# Patient Record
Sex: MALE | Race: WHITE | Employment: OTHER | URBAN - METROPOLITAN AREA
[De-identification: names, ages, dates, MRNs, and addresses within clinical notes are randomized per-mention and may not be internally consistent; named-entity substitution may affect disease eponyms.]

---

## 2017-01-04 DIAGNOSIS — D84.9 IMMUNOSUPPRESSED STATUS (H): ICD-10-CM

## 2017-01-04 DIAGNOSIS — Z94.0 KIDNEY REPLACED BY TRANSPLANT: ICD-10-CM

## 2017-01-04 LAB
ANION GAP SERPL CALCULATED.3IONS-SCNC: 9 MMOL/L (ref 3–14)
BUN SERPL-MCNC: 23 MG/DL (ref 7–30)
CALCIUM SERPL-MCNC: 9.9 MG/DL (ref 8.5–10.1)
CHLORIDE SERPL-SCNC: 106 MMOL/L (ref 94–109)
CO2 SERPL-SCNC: 26 MMOL/L (ref 20–32)
CREAT SERPL-MCNC: 1.31 MG/DL (ref 0.66–1.25)
ERYTHROCYTE [DISTWIDTH] IN BLOOD BY AUTOMATED COUNT: 13 % (ref 10–15)
GFR SERPL CREATININE-BSD FRML MDRD: 54 ML/MIN/1.7M2
GLUCOSE SERPL-MCNC: 88 MG/DL (ref 70–99)
HCT VFR BLD AUTO: 43.2 % (ref 40–53)
HGB BLD-MCNC: 13.7 G/DL (ref 13.3–17.7)
MCH RBC QN AUTO: 29 PG (ref 26.5–33)
MCHC RBC AUTO-ENTMCNC: 31.7 G/DL (ref 31.5–36.5)
MCV RBC AUTO: 91 FL (ref 78–100)
PLATELET # BLD AUTO: 132 10E9/L (ref 150–450)
POTASSIUM SERPL-SCNC: 4.9 MMOL/L (ref 3.4–5.3)
RBC # BLD AUTO: 4.73 10E12/L (ref 4.4–5.9)
SODIUM SERPL-SCNC: 141 MMOL/L (ref 133–144)
TACROLIMUS BLD-MCNC: 5.5 UG/L (ref 5–15)
TME LAST DOSE: 2000 H
WBC # BLD AUTO: 2.6 10E9/L (ref 4–11)

## 2017-01-04 PROCEDURE — 36415 COLL VENOUS BLD VENIPUNCTURE: CPT | Performed by: INTERNAL MEDICINE

## 2017-01-04 PROCEDURE — 80048 BASIC METABOLIC PNL TOTAL CA: CPT | Performed by: INTERNAL MEDICINE

## 2017-01-04 PROCEDURE — 80197 ASSAY OF TACROLIMUS: CPT | Mod: 90 | Performed by: FAMILY MEDICINE

## 2017-01-04 PROCEDURE — 99000 SPECIMEN HANDLING OFFICE-LAB: CPT | Performed by: FAMILY MEDICINE

## 2017-01-04 PROCEDURE — 85027 COMPLETE CBC AUTOMATED: CPT | Performed by: FAMILY MEDICINE

## 2017-01-31 DIAGNOSIS — D84.9 IMMUNOSUPPRESSED STATUS (H): ICD-10-CM

## 2017-01-31 DIAGNOSIS — Z94.0 KIDNEY REPLACED BY TRANSPLANT: ICD-10-CM

## 2017-01-31 LAB
ANION GAP SERPL CALCULATED.3IONS-SCNC: 5 MMOL/L (ref 3–14)
BUN SERPL-MCNC: 21 MG/DL (ref 7–30)
CALCIUM SERPL-MCNC: 9.4 MG/DL (ref 8.5–10.1)
CHLORIDE SERPL-SCNC: 107 MMOL/L (ref 94–109)
CO2 SERPL-SCNC: 27 MMOL/L (ref 20–32)
CREAT SERPL-MCNC: 1.27 MG/DL (ref 0.66–1.25)
ERYTHROCYTE [DISTWIDTH] IN BLOOD BY AUTOMATED COUNT: 13.1 % (ref 10–15)
GFR SERPL CREATININE-BSD FRML MDRD: 56 ML/MIN/1.7M2
GLUCOSE SERPL-MCNC: 101 MG/DL (ref 70–99)
HCT VFR BLD AUTO: 42.6 % (ref 40–53)
HGB BLD-MCNC: 13.6 G/DL (ref 13.3–17.7)
MCH RBC QN AUTO: 28.8 PG (ref 26.5–33)
MCHC RBC AUTO-ENTMCNC: 31.9 G/DL (ref 31.5–36.5)
MCV RBC AUTO: 90 FL (ref 78–100)
PLATELET # BLD AUTO: 125 10E9/L (ref 150–450)
POTASSIUM SERPL-SCNC: 4.5 MMOL/L (ref 3.4–5.3)
RBC # BLD AUTO: 4.72 10E12/L (ref 4.4–5.9)
SODIUM SERPL-SCNC: 139 MMOL/L (ref 133–144)
TACROLIMUS BLD-MCNC: 5.7 UG/L (ref 5–15)
TME LAST DOSE: 2000 H
WBC # BLD AUTO: 2.4 10E9/L (ref 4–11)

## 2017-01-31 PROCEDURE — 36415 COLL VENOUS BLD VENIPUNCTURE: CPT | Performed by: INTERNAL MEDICINE

## 2017-01-31 PROCEDURE — 80048 BASIC METABOLIC PNL TOTAL CA: CPT | Performed by: INTERNAL MEDICINE

## 2017-01-31 PROCEDURE — 85027 COMPLETE CBC AUTOMATED: CPT | Performed by: FAMILY MEDICINE

## 2017-01-31 PROCEDURE — 80197 ASSAY OF TACROLIMUS: CPT | Performed by: INTERNAL MEDICINE

## 2017-03-01 DIAGNOSIS — D84.9 IMMUNOSUPPRESSED STATUS (H): ICD-10-CM

## 2017-03-01 DIAGNOSIS — Z94.0 KIDNEY REPLACED BY TRANSPLANT: ICD-10-CM

## 2017-03-01 LAB
ANION GAP SERPL CALCULATED.3IONS-SCNC: 5 MMOL/L (ref 3–14)
BUN SERPL-MCNC: 21 MG/DL (ref 7–30)
CALCIUM SERPL-MCNC: 9.7 MG/DL (ref 8.5–10.1)
CHLORIDE SERPL-SCNC: 105 MMOL/L (ref 94–109)
CO2 SERPL-SCNC: 28 MMOL/L (ref 20–32)
CREAT SERPL-MCNC: 1.18 MG/DL (ref 0.66–1.25)
ERYTHROCYTE [DISTWIDTH] IN BLOOD BY AUTOMATED COUNT: 13.1 % (ref 10–15)
GFR SERPL CREATININE-BSD FRML MDRD: 61 ML/MIN/1.7M2
GLUCOSE SERPL-MCNC: 106 MG/DL (ref 70–99)
HCT VFR BLD AUTO: 43.2 % (ref 40–53)
HGB BLD-MCNC: 13.7 G/DL (ref 13.3–17.7)
MCH RBC QN AUTO: 28.5 PG (ref 26.5–33)
MCHC RBC AUTO-ENTMCNC: 31.7 G/DL (ref 31.5–36.5)
MCV RBC AUTO: 90 FL (ref 78–100)
PLATELET # BLD AUTO: 135 10E9/L (ref 150–450)
POTASSIUM SERPL-SCNC: 4.8 MMOL/L (ref 3.4–5.3)
RBC # BLD AUTO: 4.81 10E12/L (ref 4.4–5.9)
SODIUM SERPL-SCNC: 138 MMOL/L (ref 133–144)
TACROLIMUS BLD-MCNC: 4.8 UG/L (ref 5–15)
TME LAST DOSE: ABNORMAL H
WBC # BLD AUTO: 3.4 10E9/L (ref 4–11)

## 2017-03-01 PROCEDURE — 80197 ASSAY OF TACROLIMUS: CPT | Performed by: INTERNAL MEDICINE

## 2017-03-01 PROCEDURE — 36415 COLL VENOUS BLD VENIPUNCTURE: CPT | Performed by: INTERNAL MEDICINE

## 2017-03-01 PROCEDURE — 85027 COMPLETE CBC AUTOMATED: CPT | Performed by: FAMILY MEDICINE

## 2017-03-01 PROCEDURE — 80048 BASIC METABOLIC PNL TOTAL CA: CPT | Performed by: INTERNAL MEDICINE

## 2017-04-05 ENCOUNTER — RESULTS ONLY (OUTPATIENT)
Dept: OTHER | Facility: CLINIC | Age: 72
End: 2017-04-05

## 2017-04-05 DIAGNOSIS — D84.9 IMMUNOSUPPRESSED STATUS (H): ICD-10-CM

## 2017-04-05 DIAGNOSIS — Z94.0 KIDNEY REPLACED BY TRANSPLANT: ICD-10-CM

## 2017-04-05 LAB
ANION GAP SERPL CALCULATED.3IONS-SCNC: 8 MMOL/L (ref 3–14)
BUN SERPL-MCNC: 23 MG/DL (ref 7–30)
CALCIUM SERPL-MCNC: 9.7 MG/DL (ref 8.5–10.1)
CHLORIDE SERPL-SCNC: 106 MMOL/L (ref 94–109)
CO2 SERPL-SCNC: 25 MMOL/L (ref 20–32)
CREAT SERPL-MCNC: 1.15 MG/DL (ref 0.66–1.25)
CREAT UR-MCNC: 37 MG/DL
ERYTHROCYTE [DISTWIDTH] IN BLOOD BY AUTOMATED COUNT: 13.3 % (ref 10–15)
GFR SERPL CREATININE-BSD FRML MDRD: 63 ML/MIN/1.7M2
GLUCOSE SERPL-MCNC: 91 MG/DL (ref 70–99)
HCT VFR BLD AUTO: 43.2 % (ref 40–53)
HGB BLD-MCNC: 13.8 G/DL (ref 13.3–17.7)
MCH RBC QN AUTO: 28.8 PG (ref 26.5–33)
MCHC RBC AUTO-ENTMCNC: 31.9 G/DL (ref 31.5–36.5)
MCV RBC AUTO: 90 FL (ref 78–100)
PLATELET # BLD AUTO: 122 10E9/L (ref 150–450)
POTASSIUM SERPL-SCNC: 4.4 MMOL/L (ref 3.4–5.3)
PROT UR-MCNC: <0.05 G/L
PROT/CREAT 24H UR: NORMAL G/G CR (ref 0–0.2)
RBC # BLD AUTO: 4.8 10E12/L (ref 4.4–5.9)
SODIUM SERPL-SCNC: 139 MMOL/L (ref 133–144)
TACROLIMUS BLD-MCNC: 4.3 UG/L (ref 5–15)
TME LAST DOSE: ABNORMAL H
WBC # BLD AUTO: 2.4 10E9/L (ref 4–11)

## 2017-04-05 PROCEDURE — 84156 ASSAY OF PROTEIN URINE: CPT | Performed by: INTERNAL MEDICINE

## 2017-04-05 PROCEDURE — 36415 COLL VENOUS BLD VENIPUNCTURE: CPT | Performed by: INTERNAL MEDICINE

## 2017-04-05 PROCEDURE — 87799 DETECT AGENT NOS DNA QUANT: CPT | Performed by: INTERNAL MEDICINE

## 2017-04-05 PROCEDURE — 85027 COMPLETE CBC AUTOMATED: CPT | Performed by: FAMILY MEDICINE

## 2017-04-05 PROCEDURE — 86832 HLA CLASS I HIGH DEFIN QUAL: CPT | Performed by: INTERNAL MEDICINE

## 2017-04-05 PROCEDURE — 86833 HLA CLASS II HIGH DEFIN QUAL: CPT | Performed by: INTERNAL MEDICINE

## 2017-04-05 PROCEDURE — 80048 BASIC METABOLIC PNL TOTAL CA: CPT | Performed by: INTERNAL MEDICINE

## 2017-04-05 PROCEDURE — 80197 ASSAY OF TACROLIMUS: CPT | Performed by: INTERNAL MEDICINE

## 2017-04-06 LAB — PRA DONOR SPECIFIC ABY: NORMAL

## 2017-04-07 LAB
BKV DNA # SPEC NAA+PROBE: NORMAL COPIES/ML
BKV DNA SPEC NAA+PROBE-LOG#: NORMAL LOG COPIES/ML
SPECIMEN SOURCE: NORMAL

## 2017-04-11 LAB
DONOR IDENTIFICATION: NORMAL
DSA COMMENTS: NORMAL
DSA PRESENT: NO
DSA TEST METHOD: NORMAL
ORGAN: NORMAL
SA1 CELL: NORMAL
SA1 COMMENTS: NORMAL
SA1 HI RISK ABY: NORMAL
SA1 MOD RISK ABY: NORMAL
SA1 TEST METHOD: NORMAL
SA2 CELL: NORMAL
SA2 COMMENTS: NORMAL
SA2 HI RISK ABY UA: NORMAL
SA2 MOD RISK ABY: NORMAL
SA2 TEST METHOD: NORMAL

## 2017-05-03 ENCOUNTER — TELEPHONE (OUTPATIENT)
Dept: PHARMACY | Facility: CLINIC | Age: 72
End: 2017-05-03

## 2017-05-03 DIAGNOSIS — Z94.0 KIDNEY REPLACED BY TRANSPLANT: ICD-10-CM

## 2017-05-03 DIAGNOSIS — D84.9 IMMUNOSUPPRESSED STATUS (H): ICD-10-CM

## 2017-05-03 LAB
ANION GAP SERPL CALCULATED.3IONS-SCNC: 6 MMOL/L (ref 3–14)
BUN SERPL-MCNC: 22 MG/DL (ref 7–30)
CALCIUM SERPL-MCNC: 9.7 MG/DL (ref 8.5–10.1)
CHLORIDE SERPL-SCNC: 106 MMOL/L (ref 94–109)
CO2 SERPL-SCNC: 27 MMOL/L (ref 20–32)
CREAT SERPL-MCNC: 1.19 MG/DL (ref 0.66–1.25)
ERYTHROCYTE [DISTWIDTH] IN BLOOD BY AUTOMATED COUNT: 13.3 % (ref 10–15)
GFR SERPL CREATININE-BSD FRML MDRD: 60 ML/MIN/1.7M2
GLUCOSE SERPL-MCNC: 98 MG/DL (ref 70–99)
HCT VFR BLD AUTO: 42.5 % (ref 40–53)
HGB BLD-MCNC: 13.7 G/DL (ref 13.3–17.7)
MCH RBC QN AUTO: 29.1 PG (ref 26.5–33)
MCHC RBC AUTO-ENTMCNC: 32.2 G/DL (ref 31.5–36.5)
MCV RBC AUTO: 90 FL (ref 78–100)
PLATELET # BLD AUTO: 139 10E9/L (ref 150–450)
POTASSIUM SERPL-SCNC: 4.7 MMOL/L (ref 3.4–5.3)
RBC # BLD AUTO: 4.7 10E12/L (ref 4.4–5.9)
SODIUM SERPL-SCNC: 139 MMOL/L (ref 133–144)
TACROLIMUS BLD-MCNC: 4.3 UG/L (ref 5–15)
TME LAST DOSE: ABNORMAL H
WBC # BLD AUTO: 2.5 10E9/L (ref 4–11)

## 2017-05-03 PROCEDURE — 80048 BASIC METABOLIC PNL TOTAL CA: CPT | Performed by: INTERNAL MEDICINE

## 2017-05-03 PROCEDURE — 85027 COMPLETE CBC AUTOMATED: CPT | Performed by: FAMILY MEDICINE

## 2017-05-03 PROCEDURE — 36415 COLL VENOUS BLD VENIPUNCTURE: CPT | Performed by: INTERNAL MEDICINE

## 2017-05-03 PROCEDURE — 80197 ASSAY OF TACROLIMUS: CPT | Performed by: INTERNAL MEDICINE

## 2017-05-08 ENCOUNTER — OFFICE VISIT (OUTPATIENT)
Dept: NEPHROLOGY | Facility: CLINIC | Age: 72
End: 2017-05-08
Attending: INTERNAL MEDICINE
Payer: MEDICARE

## 2017-05-08 VITALS
HEIGHT: 68 IN | WEIGHT: 154 LBS | RESPIRATION RATE: 18 BRPM | HEART RATE: 65 BPM | SYSTOLIC BLOOD PRESSURE: 132 MMHG | DIASTOLIC BLOOD PRESSURE: 71 MMHG | BODY MASS INDEX: 23.34 KG/M2 | TEMPERATURE: 98.2 F

## 2017-05-08 DIAGNOSIS — Z94.0 KIDNEY REPLACED BY TRANSPLANT: Primary | ICD-10-CM

## 2017-05-08 DIAGNOSIS — Z48.298 AFTERCARE FOLLOWING ORGAN TRANSPLANT: ICD-10-CM

## 2017-05-08 DIAGNOSIS — D84.9 IMMUNOSUPPRESSED STATUS (H): ICD-10-CM

## 2017-05-08 PROCEDURE — 99212 OFFICE O/P EST SF 10 MIN: CPT | Mod: ZF

## 2017-05-08 ASSESSMENT — PAIN SCALES - GENERAL: PAINLEVEL: NO PAIN (0)

## 2017-05-08 NOTE — MR AVS SNAPSHOT
After Visit Summary   5/8/2017    Vikram Wetzel    MRN: [id number]           Patient Information     Date Of Birth          1945        Visit Information        Provider Department      5/8/2017 1:05 PM Sarkis Prieto MD [facility] Ne[facility]        Today's Diagnoses     Kidney replaced by transplant    -  1    Immunosuppressed status (H)        Aftercare following organ transplant           Follow-ups after your visit        Follow-up notes from your care team     Return in about 1 year (around 5/8/2018).      Your next 10 appointments already scheduled     Jun 07, 2017  8:00 AM CDT   Lab visit with [facility] LAB   [facility] ([facility])    [address] [place] [id number] [phone]           Please do not eat 10-12 hours before your appointment if you are coming in fasting for labs on lipids, cholesterol, or glucose (sugar). Does not apply to pregnant women.  Water with medications is okay. Do not drink coffee or other fluids.  If you have concerns about taking  your medications, please send a message by clicking on Secure Messaging, Message Your Care Team.            May 07, 2018  1:05 PM CDT   (Arrive by 12:35 PM)   Return Kidney Transplant with Sarkis Prieto MD   [facility] ([facility])    [address] [place] [id number] [phone]              Who to contact     If you have questions or need follow up information about today's clinic visit or your schedule please contact [facility] directly at [phone].  Normal or non-critical lab and imaging results will be communicated to you by MyChart, letter or phone within 4 business days after the clinic has received the results. If you do not hear from us within 7 days, please contact the clinic through MyChart or phone. If you have a critical or abnormal lab result, we will notify you by phone as soon  "as possible.  Submit refill requests through [employer] or call your pharmacy and they will forward the refill request to us. Please allow 3 business days for your refill to be completed.          Additional Information About Your Visit        compropagohart Information     [employer] gives you secure access to your electronic health record. If you see a primary care provider, you can also send messages to your care team and make appointments. If you have questions, please call your primary care clinic.  If you do not have a primary care provider, please call [phone] and they will assist you.        Care EveryWhere ID     This is your Care EveryWhere ID. This could be used by other organizations to access your [facility] medical records  [id number]        Your Vitals Were     Pulse Temperature Respirations Height BMI (Body Mass Index)       65 98.2  F (36.8  C) (Oral) 18 1.727 m (5' 8\") 23.42 kg/m2        Blood Pressure from Last 3 Encounters:   05/08/17 132/71   05/09/16 120/71   02/16/16 128/78    Weight from Last 3 Encounters:   05/08/17 69.9 kg (154 lb)   05/09/16 71.9 kg (158 lb 9.6 oz)   02/16/16 72.1 kg (159 lb)              Today, you had the following     No orders found for display       Primary Care Provider Office Phone # Fax #    Timbo Duron [phone]       XXX RESIGNED  [address]  [facility] [address]        Thank you!     Thank you for choosing [facility]  for your care. Our goal is always to provide you with excellent care. Hearing back from our patients is one way we can continue to improve our services. Please take a few minutes to complete the written survey that you may receive in the mail after your visit with us. Thank you!             Your Updated Medication List - Protect others around you: Learn how to safely use, store and throw away your medicines at www.disposemymeds.org.          This list is accurate as of: 5/8/17  1:17 PM.  Always use your " most recent med list.                   Brand Name Dispense Instructions for use    aspirin 81 MG tablet      Take 1 tablet by mouth daily.       mycophenolate capsule     120 capsule    Take 2 capsules (500 mg) by mouth 2 times daily       sulfamethoxazole-trimethoprim 400-80 MG per tablet    BACTRIM/SEPTRA    39 tablet    TAKE ONE TABLET BY MOUTH THREE TIMES A WEEK       * tacrolimus capsule     180 capsule    Total dose = 1.5 mg BID       * tacrolimus capsule     180 capsule    Total dose = 1.5 mg BID       * Notice:  This list has 2 medication(s) that are the same as other medications prescribed for you. Read the directions carefully, and ask your doctor or other care provider to review them with you.

## 2017-05-08 NOTE — PROGRESS NOTES
Assessment and Plan:  1. DDKT - baseline Cr ~ 1.3-1.5, which has remained stable to improved with latest labs.  No proteinuria.  No DSA.  Will make no changes in immunosuppression.  2. BP - well controlled at target of less than 140/90 off antihypertensive medications.  No changes.  3. Leukopenia - low, but stable WBC.  Likely related to medications.  Will follow.  4. Elevated blood glucose - normal with last several labs.  Will follow.  5. Paroxysmal atrial fibrillation - no recent episodes.  6. Skin cancer risk - no new skin lesions.  Recommend regular follow up with Dermatology.  7. Recommend return visit in 12 months.    Assessment and plan was discussed with patient and he voiced his understanding and agreement.    Reason for Visit:  Mr. Wetzel is here for routine follow up.    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         Transplant Hx:       Tx: DDKT  Date: 5/3/14       Present Maintenance IS: Tacrolimus and Mycophenolate mofetil       Baseline Creatinine: 1.3-1.5       Recent DSA: No  Date last checked: 4/2017       Biopsy: No    Mr. Wetzel reports feeling good overall with minimal medical complaints.  His energy level has been good and remains normal.  He is active and does get some exercise.  Denies any chest pain or shortness of breath with exertion.  No palpitations.  Appetite is good and weight is stable.  No nausea, vomiting or diarrhea.  No fever, sweats or chills.  No leg swelling.  No problems emptying his bladder.    Home BP: Not checked.      ROS:   A comprehensive review of systems was obtained and negative, except as noted in the HPI or PMH.    Active Medical Problems:  Patient Active Problem List   Diagnosis     Leukopenia     Dyslipidemia     Paroxysmal atrial fibrillation (H)     Immunosuppressed status (H)     Kidney replaced by transplant     AV block     Hemorrhoids, unspecified hemorrhoid type     Aftercare following  "organ transplant       Personal Hx:  Social History     Social History     Marital status:      Spouse name: N/A     Number of children: N/A     Years of education: N/A     Occupational History     Not on file.     Social History Main Topics     Smoking status: Never Smoker     Smokeless tobacco: Never Used     Alcohol use No     Drug use: No     Sexual activity: Yes     Partners: Female     Other Topics Concern     Caffeine Concern No     occ     Special Diet No     Exercise Yes     walking 5x per week - 1 hour each day     Social History Narrative       Allergies:  No Known Allergies    Medications:  Prior to Admission medications    Medication Sig Start Date End Date Taking? Authorizing Provider   PROGRAF 1 MG capsule Take 1 capsule (1 mg) by mouth 2 times daily 7/22/14   Sarkis Prieto MD   magnesium oxide 400 MG CAPS Take 400 mg by mouth daily 6/5/14   Noy Martines MD   mycophenolate (CELLCEPT-BRAND NAME) 250 MG capsule Take 4 capsules (1,000 mg) by mouth 2 times daily 5/6/14   Madeleine Levi PA-C   sulfamethoxazole-trimethoprim (BACTRIM,SEPTRA) 400-80 MG per tablet Take 1 tablet by mouth daily 5/6/14   Madeleine Levi PA-C   METOPROLOL SUCCINATE ER PO Take 25 mg by mouth 2 times daily    Reported, Patient   aspirin 81 MG tablet Take 1 tablet by mouth daily.    Reported, Patient       Vitals:  /71 (BP Location: Right arm, Cuff Size: Adult Regular)  Pulse 65  Temp 98.2  F (36.8  C) (Oral)  Resp 18  Ht 1.727 m (5' 8\")  Wt 69.9 kg (154 lb)  BMI 23.42 kg/m2    Exam:   GENERAL APPEARANCE: alert and no distress  HENT: mouth without ulcers or lesions  LYMPHATICS: no cervical or supraclavicular nodes  RESP: lungs clear to auscultation - no rales, rhonchi or wheezes  CV: regular rhythm, normal rate, no rub, no murmur  EDEMA: no LE edema bilaterally  ABDOMEN: soft, nondistended, nontender, bowel sounds normal  MS: extremities normal - no gross deformities noted, no " evidence of inflammation in joints, no muscle tenderness  SKIN: no rash  TX KIDNEY: normal    Results:   Recent Results (from the past 336 hour(s))   CBC with platelets    Collection Time: 05/03/17  7:55 AM   Result Value Ref Range    WBC 2.5 (L) 4.0 - 11.0 10e9/L    RBC Count 4.70 4.4 - 5.9 10e12/L    Hemoglobin 13.7 13.3 - 17.7 g/dL    Hematocrit 42.5 40.0 - 53.0 %    MCV 90 78 - 100 fl    MCH 29.1 26.5 - 33.0 pg    MCHC 32.2 31.5 - 36.5 g/dL    RDW 13.3 10.0 - 15.0 %    Platelet Count 139 (L) 150 - 450 10e9/L   Basic metabolic panel    Collection Time: 05/03/17  7:55 AM   Result Value Ref Range    Sodium 139 133 - 144 mmol/L    Potassium 4.7 3.4 - 5.3 mmol/L    Chloride 106 94 - 109 mmol/L    Carbon Dioxide 27 20 - 32 mmol/L    Anion Gap 6 3 - 14 mmol/L    Glucose 98 70 - 99 mg/dL    Urea Nitrogen 22 7 - 30 mg/dL    Creatinine 1.19 0.66 - 1.25 mg/dL    GFR Estimate 60 (L) >60 mL/min/1.7m2    GFR Estimate If Black 73 >60 mL/min/1.7m2    Calcium 9.7 8.5 - 10.1 mg/dL   Tacrolimus level    Collection Time: 05/03/17  7:55 AM   Result Value Ref Range    Tacrolimus Last Dose 2000 05/02/17     Tacrolimus Level 4.3 (L) 5.0 - 15.0 ug/L

## 2017-05-08 NOTE — LETTER
5/8/2017      RE: Vikram Wetzel  [address] [facility] [id number]       Assessment and Plan:  1. DDKT - baseline Cr ~ 1.3-1.5, which has remained stable to improved with latest labs.  No proteinuria.  No DSA.  Will make no changes in immunosuppression.  2. BP - well controlled at target of less than 140/90 off antihypertensive medications.  No changes.  3. Leukopenia - low, but stable WBC.  Likely related to medications.  Will follow.  4. Elevated blood glucose - normal with last several labs.  Will follow.  5. Paroxysmal atrial fibrillation - no recent episodes.  6. Skin cancer risk - no new skin lesions.  Recommend regular follow up with Dermatology.  7. Recommend return visit in 12 months.    Assessment and plan was discussed with patient and he voiced his understanding and agreement.    Reason for Visit:  Mr. Wetzel is here for routine follow up.    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         Transplant Hx:       Tx: DDKT  Date: 5/3/14       Present Maintenance IS: Tacrolimus and Mycophenolate mofetil       Baseline Creatinine: 1.3-1.5       Recent DSA: No  Date last checked: 4/2017       Biopsy: No    Mr. Wetzel reports feeling good overall with minimal medical complaints.  His energy level has been good and remains normal.  He is active and does get some exercise.  Denies any chest pain or shortness of breath with exertion.  No palpitations.  Appetite is good and weight is stable.  No nausea, vomiting or diarrhea.  No fever, sweats or chills.  No leg swelling.  No problems emptying his bladder.    Home BP: Not checked.      ROS:   A comprehensive review of systems was obtained and negative, except as noted in the HPI or PMH.    Active Medical Problems:  Patient Active Problem List   Diagnosis     Leukopenia     Dyslipidemia     Paroxysmal atrial fibrillation (H)     Immunosuppressed status (H)     Kidney replaced by  "transplant     AV block     Hemorrhoids, unspecified hemorrhoid type     Aftercare following organ transplant       Personal Hx:  Social History     Social History     Marital status:      Spouse name: N/A     Number of children: N/A     Years of education: N/A     Occupational History     Not on file.     Social History Main Topics     Smoking status: Never Smoker     Smokeless tobacco: Never Used     Alcohol use No     Drug use: No     Sexual activity: Yes     Partners: Female     Other Topics Concern     Caffeine Concern No     occ     Special Diet No     Exercise Yes     walking 5x per week - 1 hour each day     Social History Narrative       Allergies:  No Known Allergies    Medications:  Prior to Admission medications    Medication Sig Start Date End Date Taking? Authorizing Provider   PROGRAF 1 MG capsule Take 1 capsule (1 mg) by mouth 2 times daily 7/22/14   Sarkis Prieto MD   magnesium oxide 400 MG CAPS Take 400 mg by mouth daily 6/5/14   Noy Martines MD   mycophenolate (CELLCEPT-BRAND NAME) 250 MG capsule Take 4 capsules (1,000 mg) by mouth 2 times daily 5/6/14   Madeleine Levi PA-C   sulfamethoxazole-trimethoprim (BACTRIM,SEPTRA) 400-80 MG per tablet Take 1 tablet by mouth daily 5/6/14   Madeleine Levi PA-C   METOPROLOL SUCCINATE ER PO Take 25 mg by mouth 2 times daily    Reported, Patient   aspirin 81 MG tablet Take 1 tablet by mouth daily.    Reported, Patient       Vitals:  /71 (BP Location: Right arm, Cuff Size: Adult Regular)  Pulse 65  Temp 98.2  F (36.8  C) (Oral)  Resp 18  Ht 1.727 m (5' 8\")  Wt 69.9 kg (154 lb)  BMI 23.42 kg/m2    Exam:   GENERAL APPEARANCE: alert and no distress  HENT: mouth without ulcers or lesions  LYMPHATICS: no cervical or supraclavicular nodes  RESP: lungs clear to auscultation - no rales, rhonchi or wheezes  CV: regular rhythm, normal rate, no rub, no murmur  EDEMA: no LE edema bilaterally  ABDOMEN: soft, nondistended, " nontender, bowel sounds normal  MS: extremities normal - no gross deformities noted, no evidence of inflammation in joints, no muscle tenderness  SKIN: no rash  TX KIDNEY: normal    Results:   Recent Results (from the past 336 hour(s))   CBC with platelets    Collection Time: 05/03/17  7:55 AM   Result Value Ref Range    WBC 2.5 (L) 4.0 - 11.0 10e9/L    RBC Count 4.70 4.4 - 5.9 10e12/L    Hemoglobin 13.7 13.3 - 17.7 g/dL    Hematocrit 42.5 40.0 - 53.0 %    MCV 90 78 - 100 fl    MCH 29.1 26.5 - 33.0 pg    MCHC 32.2 31.5 - 36.5 g/dL    RDW 13.3 10.0 - 15.0 %    Platelet Count 139 (L) 150 - 450 10e9/L   Basic metabolic panel    Collection Time: 05/03/17  7:55 AM   Result Value Ref Range    Sodium 139 133 - 144 mmol/L    Potassium 4.7 3.4 - 5.3 mmol/L    Chloride 106 94 - 109 mmol/L    Carbon Dioxide 27 20 - 32 mmol/L    Anion Gap 6 3 - 14 mmol/L    Glucose 98 70 - 99 mg/dL    Urea Nitrogen 22 7 - 30 mg/dL    Creatinine 1.19 0.66 - 1.25 mg/dL    GFR Estimate 60 (L) >60 mL/min/1.7m2    GFR Estimate If Black 73 >60 mL/min/1.7m2    Calcium 9.7 8.5 - 10.1 mg/dL   Tacrolimus level    Collection Time: 05/03/17  7:55 AM   Result Value Ref Range    Tacrolimus Last Dose 2000 05/02/17     Tacrolimus Level 4.3 (L) 5.0 - 15.0 ug/L       Sarkis Prieto MD

## 2017-05-08 NOTE — NURSING NOTE
"Chief Complaint   Patient presents with     RECHECK     1 yr follow up       Initial /71 (BP Location: Right arm, Cuff Size: Adult Regular)  Pulse 65  Temp 98.2  F (36.8  C) (Oral)  Resp 18  Ht 1.727 m (5' 8\")  Wt 69.9 kg (154 lb)  BMI 23.42 kg/m2 Estimated body mass index is 23.42 kg/(m^2) as calculated from the following:    Height as of this encounter: 1.727 m (5' 8\").    Weight as of this encounter: 69.9 kg (154 lb).  Medication Reconciliation: complete   LEDA ROBERTS CMA      "

## 2017-06-07 DIAGNOSIS — Z94.0 KIDNEY REPLACED BY TRANSPLANT: ICD-10-CM

## 2017-06-07 DIAGNOSIS — D84.9 IMMUNOSUPPRESSED STATUS (H): ICD-10-CM

## 2017-06-07 LAB
ANION GAP SERPL CALCULATED.3IONS-SCNC: 6 MMOL/L (ref 3–14)
BUN SERPL-MCNC: 23 MG/DL (ref 7–30)
CALCIUM SERPL-MCNC: 9.6 MG/DL (ref 8.5–10.1)
CHLORIDE SERPL-SCNC: 109 MMOL/L (ref 94–109)
CO2 SERPL-SCNC: 26 MMOL/L (ref 20–32)
CREAT SERPL-MCNC: 1.22 MG/DL (ref 0.66–1.25)
ERYTHROCYTE [DISTWIDTH] IN BLOOD BY AUTOMATED COUNT: 13.3 % (ref 10–15)
GFR SERPL CREATININE-BSD FRML MDRD: 59 ML/MIN/1.7M2
GLUCOSE SERPL-MCNC: 100 MG/DL (ref 70–99)
HCT VFR BLD AUTO: 43.6 % (ref 40–53)
HGB BLD-MCNC: 13.9 G/DL (ref 13.3–17.7)
MCH RBC QN AUTO: 29.4 PG (ref 26.5–33)
MCHC RBC AUTO-ENTMCNC: 31.9 G/DL (ref 31.5–36.5)
MCV RBC AUTO: 92 FL (ref 78–100)
PLATELET # BLD AUTO: 116 10E9/L (ref 150–450)
POTASSIUM SERPL-SCNC: 4.9 MMOL/L (ref 3.4–5.3)
RBC # BLD AUTO: 4.73 10E12/L (ref 4.4–5.9)
SODIUM SERPL-SCNC: 141 MMOL/L (ref 133–144)
TACROLIMUS BLD-MCNC: 4.4 UG/L (ref 5–15)
TME LAST DOSE: ABNORMAL H
WBC # BLD AUTO: 2.8 10E9/L (ref 4–11)

## 2017-06-07 PROCEDURE — 80197 ASSAY OF TACROLIMUS: CPT | Performed by: INTERNAL MEDICINE

## 2017-06-07 PROCEDURE — 36415 COLL VENOUS BLD VENIPUNCTURE: CPT | Performed by: INTERNAL MEDICINE

## 2017-06-07 PROCEDURE — 85027 COMPLETE CBC AUTOMATED: CPT | Performed by: FAMILY MEDICINE

## 2017-06-07 PROCEDURE — 80048 BASIC METABOLIC PNL TOTAL CA: CPT | Performed by: INTERNAL MEDICINE

## 2017-07-25 DIAGNOSIS — Z94.0 KIDNEY TRANSPLANTED: ICD-10-CM

## 2017-07-25 RX ORDER — TACROLIMUS 0.5 MG/1
CAPSULE, GELATIN COATED ORAL
Qty: 180 CAPSULE | Refills: 11 | Status: SHIPPED | OUTPATIENT
Start: 2017-07-25 | End: 2017-08-25

## 2017-08-02 DIAGNOSIS — D84.9 IMMUNOSUPPRESSED STATUS (H): ICD-10-CM

## 2017-08-02 DIAGNOSIS — Z94.0 KIDNEY REPLACED BY TRANSPLANT: ICD-10-CM

## 2017-08-02 LAB
ANION GAP SERPL CALCULATED.3IONS-SCNC: 7 MMOL/L (ref 3–14)
BUN SERPL-MCNC: 30 MG/DL (ref 7–30)
CALCIUM SERPL-MCNC: 9.7 MG/DL (ref 8.5–10.1)
CHLORIDE SERPL-SCNC: 108 MMOL/L (ref 94–109)
CO2 SERPL-SCNC: 25 MMOL/L (ref 20–32)
CREAT SERPL-MCNC: 1.18 MG/DL (ref 0.66–1.25)
ERYTHROCYTE [DISTWIDTH] IN BLOOD BY AUTOMATED COUNT: 13.3 % (ref 10–15)
GFR SERPL CREATININE-BSD FRML MDRD: 61 ML/MIN/1.7M2
GLUCOSE SERPL-MCNC: 109 MG/DL (ref 70–99)
HCT VFR BLD AUTO: 43.1 % (ref 40–53)
HGB BLD-MCNC: 13.8 G/DL (ref 13.3–17.7)
MCH RBC QN AUTO: 29.5 PG (ref 26.5–33)
MCHC RBC AUTO-ENTMCNC: 32 G/DL (ref 31.5–36.5)
MCV RBC AUTO: 92 FL (ref 78–100)
PLATELET # BLD AUTO: 115 10E9/L (ref 150–450)
POTASSIUM SERPL-SCNC: 4.8 MMOL/L (ref 3.4–5.3)
RBC # BLD AUTO: 4.68 10E12/L (ref 4.4–5.9)
SODIUM SERPL-SCNC: 140 MMOL/L (ref 133–144)
WBC # BLD AUTO: 3.1 10E9/L (ref 4–11)

## 2017-08-02 PROCEDURE — 36415 COLL VENOUS BLD VENIPUNCTURE: CPT | Performed by: INTERNAL MEDICINE

## 2017-08-02 PROCEDURE — 80048 BASIC METABOLIC PNL TOTAL CA: CPT | Performed by: INTERNAL MEDICINE

## 2017-08-02 PROCEDURE — 85027 COMPLETE CBC AUTOMATED: CPT | Performed by: FAMILY MEDICINE

## 2017-08-02 PROCEDURE — 80197 ASSAY OF TACROLIMUS: CPT | Performed by: INTERNAL MEDICINE

## 2017-08-03 LAB
TACROLIMUS BLD-MCNC: 6.5 UG/L (ref 5–15)
TME LAST DOSE: NORMAL H

## 2017-08-25 DIAGNOSIS — Z94.0 KIDNEY TRANSPLANTED: Primary | ICD-10-CM

## 2017-08-25 RX ORDER — TACROLIMUS 1 MG/1
1 CAPSULE, GELATIN COATED ORAL 2 TIMES DAILY
Qty: 180 CAPSULE | Refills: 3 | Status: SHIPPED | OUTPATIENT
Start: 2017-08-25 | End: 2018-01-26

## 2017-08-25 RX ORDER — TACROLIMUS 0.5 MG/1
0.5 CAPSULE, GELATIN COATED ORAL 2 TIMES DAILY
Qty: 180 CAPSULE | Refills: 3 | Status: SHIPPED | OUTPATIENT
Start: 2017-08-25 | End: 2018-01-26

## 2017-08-25 NOTE — TELEPHONE ENCOUNTER
Drug Name: prograf 1mg  Last Fill Date: 7/28/17  Quantity: 180    Kristin Jacklyn   [facility]  [phone]

## 2017-10-04 ENCOUNTER — RESULTS ONLY (OUTPATIENT)
Dept: OTHER | Facility: CLINIC | Age: 72
End: 2017-10-04

## 2017-10-04 DIAGNOSIS — D84.9 IMMUNOSUPPRESSED STATUS (H): ICD-10-CM

## 2017-10-04 DIAGNOSIS — Z94.0 KIDNEY REPLACED BY TRANSPLANT: ICD-10-CM

## 2017-10-04 LAB
ANION GAP SERPL CALCULATED.3IONS-SCNC: 9 MMOL/L (ref 3–14)
BUN SERPL-MCNC: 24 MG/DL (ref 7–30)
CALCIUM SERPL-MCNC: 9.6 MG/DL (ref 8.5–10.1)
CHLORIDE SERPL-SCNC: 107 MMOL/L (ref 94–109)
CO2 SERPL-SCNC: 24 MMOL/L (ref 20–32)
CREAT SERPL-MCNC: 1.18 MG/DL (ref 0.66–1.25)
CREAT UR-MCNC: 54 MG/DL
ERYTHROCYTE [DISTWIDTH] IN BLOOD BY AUTOMATED COUNT: 13.5 % (ref 10–15)
GFR SERPL CREATININE-BSD FRML MDRD: 61 ML/MIN/1.7M2
GLUCOSE SERPL-MCNC: 93 MG/DL (ref 70–99)
HCT VFR BLD AUTO: 43 % (ref 40–53)
HGB BLD-MCNC: 13.7 G/DL (ref 13.3–17.7)
MCH RBC QN AUTO: 29.1 PG (ref 26.5–33)
MCHC RBC AUTO-ENTMCNC: 31.9 G/DL (ref 31.5–36.5)
MCV RBC AUTO: 92 FL (ref 78–100)
PLATELET # BLD AUTO: 121 10E9/L (ref 150–450)
POTASSIUM SERPL-SCNC: 4.8 MMOL/L (ref 3.4–5.3)
PROT UR-MCNC: <0.05 G/L
PROT/CREAT 24H UR: NORMAL G/G CR (ref 0–0.2)
RBC # BLD AUTO: 4.7 10E12/L (ref 4.4–5.9)
SODIUM SERPL-SCNC: 140 MMOL/L (ref 133–144)
TACROLIMUS BLD-MCNC: 4.2 UG/L (ref 5–15)
TME LAST DOSE: 2000 H
WBC # BLD AUTO: 2.7 10E9/L (ref 4–11)

## 2017-10-04 PROCEDURE — 80197 ASSAY OF TACROLIMUS: CPT | Performed by: INTERNAL MEDICINE

## 2017-10-04 PROCEDURE — 85027 COMPLETE CBC AUTOMATED: CPT | Performed by: FAMILY MEDICINE

## 2017-10-04 PROCEDURE — 84156 ASSAY OF PROTEIN URINE: CPT | Performed by: INTERNAL MEDICINE

## 2017-10-04 PROCEDURE — 86833 HLA CLASS II HIGH DEFIN QUAL: CPT | Performed by: INTERNAL MEDICINE

## 2017-10-04 PROCEDURE — 80048 BASIC METABOLIC PNL TOTAL CA: CPT | Performed by: INTERNAL MEDICINE

## 2017-10-04 PROCEDURE — 86832 HLA CLASS I HIGH DEFIN QUAL: CPT | Performed by: INTERNAL MEDICINE

## 2017-10-04 PROCEDURE — 87799 DETECT AGENT NOS DNA QUANT: CPT | Performed by: INTERNAL MEDICINE

## 2017-10-04 PROCEDURE — 36415 COLL VENOUS BLD VENIPUNCTURE: CPT | Performed by: INTERNAL MEDICINE

## 2017-10-05 LAB — PRA DONOR SPECIFIC ABY: NORMAL

## 2017-11-17 DIAGNOSIS — Z94.0 STATUS POST KIDNEY TRANSPLANT: ICD-10-CM

## 2017-11-17 RX ORDER — SULFAMETHOXAZOLE AND TRIMETHOPRIM 400; 80 MG/1; MG/1
TABLET ORAL
Qty: 36 TABLET | Refills: 3 | Status: SHIPPED | OUTPATIENT
Start: 2017-11-17 | End: 2019-05-07

## 2017-12-04 DIAGNOSIS — Z48.298 AFTERCARE FOLLOWING ORGAN TRANSPLANT: ICD-10-CM

## 2017-12-04 DIAGNOSIS — Z79.899 ENCOUNTER FOR LONG-TERM CURRENT USE OF MEDICATION: ICD-10-CM

## 2017-12-04 DIAGNOSIS — Z94.0 KIDNEY REPLACED BY TRANSPLANT: Primary | ICD-10-CM

## 2017-12-06 ENCOUNTER — RESULTS ONLY (OUTPATIENT)
Dept: OTHER | Facility: CLINIC | Age: 72
End: 2017-12-06

## 2017-12-06 DIAGNOSIS — Z48.298 AFTERCARE FOLLOWING ORGAN TRANSPLANT: ICD-10-CM

## 2017-12-06 DIAGNOSIS — Z94.0 KIDNEY REPLACED BY TRANSPLANT: ICD-10-CM

## 2017-12-06 DIAGNOSIS — Z79.899 ENCOUNTER FOR LONG-TERM CURRENT USE OF MEDICATION: ICD-10-CM

## 2017-12-06 LAB
ANION GAP SERPL CALCULATED.3IONS-SCNC: 5 MMOL/L (ref 3–14)
BUN SERPL-MCNC: 26 MG/DL (ref 7–30)
CALCIUM SERPL-MCNC: 9.5 MG/DL (ref 8.5–10.1)
CHLORIDE SERPL-SCNC: 108 MMOL/L (ref 94–109)
CO2 SERPL-SCNC: 26 MMOL/L (ref 20–32)
CREAT SERPL-MCNC: 1.37 MG/DL (ref 0.66–1.25)
CREAT UR-MCNC: 42 MG/DL
ERYTHROCYTE [DISTWIDTH] IN BLOOD BY AUTOMATED COUNT: 13.2 % (ref 10–15)
GFR SERPL CREATININE-BSD FRML MDRD: 51 ML/MIN/1.7M2
GLUCOSE SERPL-MCNC: 95 MG/DL (ref 70–99)
HCT VFR BLD AUTO: 42.3 % (ref 40–53)
HGB BLD-MCNC: 13.6 G/DL (ref 13.3–17.7)
MCH RBC QN AUTO: 29.4 PG (ref 26.5–33)
MCHC RBC AUTO-ENTMCNC: 32.2 G/DL (ref 31.5–36.5)
MCV RBC AUTO: 91 FL (ref 78–100)
PLATELET # BLD AUTO: 119 10E9/L (ref 150–450)
POTASSIUM SERPL-SCNC: 4.6 MMOL/L (ref 3.4–5.3)
PROT UR-MCNC: <0.05 G/L
PROT/CREAT 24H UR: NORMAL G/G CR (ref 0–0.2)
RBC # BLD AUTO: 4.63 10E12/L (ref 4.4–5.9)
SODIUM SERPL-SCNC: 139 MMOL/L (ref 133–144)
TACROLIMUS BLD-MCNC: 4.3 UG/L (ref 5–15)
TME LAST DOSE: ABNORMAL H
WBC # BLD AUTO: 3 10E9/L (ref 4–11)

## 2017-12-06 PROCEDURE — 80048 BASIC METABOLIC PNL TOTAL CA: CPT | Performed by: INTERNAL MEDICINE

## 2017-12-06 PROCEDURE — 86832 HLA CLASS I HIGH DEFIN QUAL: CPT | Performed by: INTERNAL MEDICINE

## 2017-12-06 PROCEDURE — 85027 COMPLETE CBC AUTOMATED: CPT | Performed by: FAMILY MEDICINE

## 2017-12-06 PROCEDURE — 86833 HLA CLASS II HIGH DEFIN QUAL: CPT | Performed by: INTERNAL MEDICINE

## 2017-12-06 PROCEDURE — 87799 DETECT AGENT NOS DNA QUANT: CPT | Performed by: INTERNAL MEDICINE

## 2017-12-06 PROCEDURE — 80197 ASSAY OF TACROLIMUS: CPT | Performed by: INTERNAL MEDICINE

## 2017-12-06 PROCEDURE — 84156 ASSAY OF PROTEIN URINE: CPT | Performed by: INTERNAL MEDICINE

## 2017-12-06 PROCEDURE — 36415 COLL VENOUS BLD VENIPUNCTURE: CPT | Performed by: INTERNAL MEDICINE

## 2017-12-07 LAB — PRA DONOR SPECIFIC ABY: NORMAL

## 2017-12-18 LAB
DONOR IDENTIFICATION: NORMAL
DSA COMMENTS: NORMAL
DSA PRESENT: NO
DSA TEST METHOD: NORMAL
ORGAN: NORMAL
SA1 CELL: NORMAL
SA1 COMMENTS: NORMAL
SA1 HI RISK ABY: NORMAL
SA1 MOD RISK ABY: NORMAL
SA1 TEST METHOD: NORMAL
SA2 CELL: NORMAL
SA2 COMMENTS: NORMAL
SA2 HI RISK ABY UA: NORMAL
SA2 MOD RISK ABY: NORMAL
SA2 TEST METHOD: NORMAL
UNOS CPRA: 3

## 2018-01-16 DIAGNOSIS — Z94.0 KIDNEY TRANSPLANTED: Primary | ICD-10-CM

## 2018-01-16 RX ORDER — MYCOPHENOLATE MOFETIL 250 MG/1
500 CAPSULE ORAL 2 TIMES DAILY
Qty: 120 CAPSULE | Refills: 11 | Status: SHIPPED | OUTPATIENT
Start: 2018-01-16 | End: 2018-12-21

## 2018-01-26 DIAGNOSIS — Z94.0 KIDNEY TRANSPLANTED: ICD-10-CM

## 2018-01-26 RX ORDER — TACROLIMUS 0.5 MG/1
0.5 CAPSULE, GELATIN COATED ORAL 2 TIMES DAILY
Qty: 60 CAPSULE | Refills: 11 | Status: SHIPPED | OUTPATIENT
Start: 2018-01-26 | End: 2019-01-21

## 2018-01-26 RX ORDER — TACROLIMUS 1 MG/1
1 CAPSULE, GELATIN COATED ORAL 2 TIMES DAILY
Qty: 60 CAPSULE | Refills: 11 | Status: SHIPPED | OUTPATIENT
Start: 2018-01-26 | End: 2019-01-21

## 2018-01-26 NOTE — TELEPHONE ENCOUNTER
Per medicare guidelines need new rx for prograf 1mg and 0.5 for a 30 day supply    Thank you    Kristin Villasenor   [facility]  [phone]

## 2018-02-07 DIAGNOSIS — Z94.0 KIDNEY REPLACED BY TRANSPLANT: ICD-10-CM

## 2018-02-07 DIAGNOSIS — Z79.899 ENCOUNTER FOR LONG-TERM CURRENT USE OF MEDICATION: ICD-10-CM

## 2018-02-07 DIAGNOSIS — Z48.298 AFTERCARE FOLLOWING ORGAN TRANSPLANT: ICD-10-CM

## 2018-02-07 LAB
ANION GAP SERPL CALCULATED.3IONS-SCNC: 7 MMOL/L (ref 3–14)
BUN SERPL-MCNC: 20 MG/DL (ref 7–30)
CALCIUM SERPL-MCNC: 9.6 MG/DL (ref 8.5–10.1)
CHLORIDE SERPL-SCNC: 105 MMOL/L (ref 94–109)
CO2 SERPL-SCNC: 26 MMOL/L (ref 20–32)
CREAT SERPL-MCNC: 1.33 MG/DL (ref 0.66–1.25)
ERYTHROCYTE [DISTWIDTH] IN BLOOD BY AUTOMATED COUNT: 13.2 % (ref 10–15)
GFR SERPL CREATININE-BSD FRML MDRD: 53 ML/MIN/1.7M2
GLUCOSE SERPL-MCNC: 91 MG/DL (ref 70–99)
HCT VFR BLD AUTO: 42.5 % (ref 40–53)
HGB BLD-MCNC: 13.5 G/DL (ref 13.3–17.7)
MCH RBC QN AUTO: 29.1 PG (ref 26.5–33)
MCHC RBC AUTO-ENTMCNC: 31.8 G/DL (ref 31.5–36.5)
MCV RBC AUTO: 92 FL (ref 78–100)
PLATELET # BLD AUTO: 131 10E9/L (ref 150–450)
POTASSIUM SERPL-SCNC: 4.7 MMOL/L (ref 3.4–5.3)
RBC # BLD AUTO: 4.64 10E12/L (ref 4.4–5.9)
SODIUM SERPL-SCNC: 138 MMOL/L (ref 133–144)
TACROLIMUS BLD-MCNC: 4.6 UG/L (ref 5–15)
TME LAST DOSE: ABNORMAL H
WBC # BLD AUTO: 3.2 10E9/L (ref 4–11)

## 2018-02-07 PROCEDURE — 80197 ASSAY OF TACROLIMUS: CPT | Performed by: INTERNAL MEDICINE

## 2018-02-07 PROCEDURE — 85027 COMPLETE CBC AUTOMATED: CPT | Performed by: FAMILY MEDICINE

## 2018-02-07 PROCEDURE — 36415 COLL VENOUS BLD VENIPUNCTURE: CPT | Performed by: INTERNAL MEDICINE

## 2018-02-07 PROCEDURE — 80048 BASIC METABOLIC PNL TOTAL CA: CPT | Performed by: INTERNAL MEDICINE

## 2018-04-04 ENCOUNTER — TELEPHONE (OUTPATIENT)
Dept: TRANSPLANT | Facility: CLINIC | Age: 73
End: 2018-04-04

## 2018-04-04 DIAGNOSIS — Z79.899 ENCOUNTER FOR LONG-TERM CURRENT USE OF MEDICATION: ICD-10-CM

## 2018-04-04 DIAGNOSIS — Z48.298 AFTERCARE FOLLOWING ORGAN TRANSPLANT: ICD-10-CM

## 2018-04-04 DIAGNOSIS — Z94.0 KIDNEY REPLACED BY TRANSPLANT: ICD-10-CM

## 2018-04-04 LAB
ANION GAP SERPL CALCULATED.3IONS-SCNC: 3 MMOL/L (ref 3–14)
BASOPHILS # BLD AUTO: 0 10E9/L (ref 0–0.2)
BASOPHILS NFR BLD AUTO: 0.6 %
BUN SERPL-MCNC: 23 MG/DL (ref 7–30)
CALCIUM SERPL-MCNC: 9.4 MG/DL (ref 8.5–10.1)
CHLORIDE SERPL-SCNC: 104 MMOL/L (ref 94–109)
CO2 SERPL-SCNC: 28 MMOL/L (ref 20–32)
CREAT SERPL-MCNC: 1.35 MG/DL (ref 0.66–1.25)
DIFFERENTIAL METHOD BLD: ABNORMAL
EOSINOPHIL # BLD AUTO: 0.1 10E9/L (ref 0–0.7)
EOSINOPHIL NFR BLD AUTO: 3 %
ERYTHROCYTE [DISTWIDTH] IN BLOOD BY AUTOMATED COUNT: 12.9 % (ref 10–15)
GFR SERPL CREATININE-BSD FRML MDRD: 52 ML/MIN/1.7M2
GLUCOSE SERPL-MCNC: 90 MG/DL (ref 70–99)
HCT VFR BLD AUTO: 41.3 % (ref 40–53)
HGB BLD-MCNC: 13.1 G/DL (ref 13.3–17.7)
LYMPHOCYTES # BLD AUTO: 0.5 10E9/L (ref 0.8–5.3)
LYMPHOCYTES NFR BLD AUTO: 31.7 %
MCH RBC QN AUTO: 29 PG (ref 26.5–33)
MCHC RBC AUTO-ENTMCNC: 31.7 G/DL (ref 31.5–36.5)
MCV RBC AUTO: 92 FL (ref 78–100)
MONOCYTES # BLD AUTO: 0.3 10E9/L (ref 0–1.3)
MONOCYTES NFR BLD AUTO: 15.6 %
NEUTROPHILS # BLD AUTO: 0.8 10E9/L (ref 1.6–8.3)
NEUTROPHILS NFR BLD AUTO: 49.1 %
PLATELET # BLD AUTO: 113 10E9/L (ref 150–450)
POTASSIUM SERPL-SCNC: 4.7 MMOL/L (ref 3.4–5.3)
RBC # BLD AUTO: 4.51 10E12/L (ref 4.4–5.9)
SODIUM SERPL-SCNC: 135 MMOL/L (ref 133–144)
TACROLIMUS BLD-MCNC: 5.3 UG/L (ref 5–15)
TME LAST DOSE: 2000 H
WBC # BLD AUTO: 1.7 10E9/L (ref 4–11)

## 2018-04-04 PROCEDURE — 80048 BASIC METABOLIC PNL TOTAL CA: CPT | Performed by: INTERNAL MEDICINE

## 2018-04-04 PROCEDURE — 36415 COLL VENOUS BLD VENIPUNCTURE: CPT | Performed by: INTERNAL MEDICINE

## 2018-04-04 PROCEDURE — 85025 COMPLETE CBC W/AUTO DIFF WBC: CPT | Performed by: FAMILY MEDICINE

## 2018-04-04 PROCEDURE — 80197 ASSAY OF TACROLIMUS: CPT | Performed by: INTERNAL MEDICINE

## 2018-04-04 NOTE — TELEPHONE ENCOUNTER
DATE:  4/4/2018   TIME OF RECEIPT FROM LAB:  8:51 am  LAB TEST:  WBC  LAB VALUE:  1.67  RESULTS GIVEN WITH READ-BACK TO (PROVIDER):  Yenni Abreu  TIME LAB VALUE REPORTED TO PROVIDER:   9:15 am    Paged and called Devora Sawyer and Estrella Mcgill with no response. Yenni accepted lab value.

## 2018-04-06 ENCOUNTER — TELEPHONE (OUTPATIENT)
Dept: TRANSPLANT | Facility: CLINIC | Age: 73
End: 2018-04-06

## 2018-04-06 DIAGNOSIS — Z94.0 KIDNEY REPLACED BY TRANSPLANT: Primary | ICD-10-CM

## 2018-04-06 NOTE — TELEPHONE ENCOUNTER
Sarkis Prieto MD Krull, Devora LAM RN                   Decreased white blood cell count and would work up patient for viral infection, including CMV.       Please call pt and request he have labs drawn: Please add to standing orders: BMP, CBC with differential, BK, CMV, EBV. Please perform med review.

## 2018-04-06 NOTE — TELEPHONE ENCOUNTER
Call placed to patient: Made aware of the need for additional lab work. Patient state verbalize understanding to repeat lab work. Patient would like to note that he was coming down from a cold during his lab draw. Orders placed

## 2018-04-09 ENCOUNTER — TELEPHONE (OUTPATIENT)
Dept: PHARMACY | Facility: CLINIC | Age: 73
End: 2018-04-09

## 2018-04-11 DIAGNOSIS — Z94.0 KIDNEY REPLACED BY TRANSPLANT: ICD-10-CM

## 2018-04-11 LAB
ANION GAP SERPL CALCULATED.3IONS-SCNC: 7 MMOL/L (ref 3–14)
BASOPHILS # BLD AUTO: 0 10E9/L (ref 0–0.2)
BASOPHILS NFR BLD AUTO: 0.3 %
BUN SERPL-MCNC: 24 MG/DL (ref 7–30)
CALCIUM SERPL-MCNC: 9.6 MG/DL (ref 8.5–10.1)
CHLORIDE SERPL-SCNC: 110 MMOL/L (ref 94–109)
CO2 SERPL-SCNC: 24 MMOL/L (ref 20–32)
CREAT SERPL-MCNC: 1.17 MG/DL (ref 0.66–1.25)
DIFFERENTIAL METHOD BLD: ABNORMAL
EOSINOPHIL # BLD AUTO: 0.1 10E9/L (ref 0–0.7)
EOSINOPHIL NFR BLD AUTO: 2.1 %
ERYTHROCYTE [DISTWIDTH] IN BLOOD BY AUTOMATED COUNT: 13.1 % (ref 10–15)
GFR SERPL CREATININE-BSD FRML MDRD: 61 ML/MIN/1.7M2
GLUCOSE SERPL-MCNC: 101 MG/DL (ref 70–99)
HCT VFR BLD AUTO: 42 % (ref 40–53)
HGB BLD-MCNC: 13.3 G/DL (ref 13.3–17.7)
LYMPHOCYTES # BLD AUTO: 0.6 10E9/L (ref 0.8–5.3)
LYMPHOCYTES NFR BLD AUTO: 19.4 %
MCH RBC QN AUTO: 28.9 PG (ref 26.5–33)
MCHC RBC AUTO-ENTMCNC: 31.7 G/DL (ref 31.5–36.5)
MCV RBC AUTO: 91 FL (ref 78–100)
MONOCYTES # BLD AUTO: 0.3 10E9/L (ref 0–1.3)
MONOCYTES NFR BLD AUTO: 11.1 %
NEUTROPHILS # BLD AUTO: 1.9 10E9/L (ref 1.6–8.3)
NEUTROPHILS NFR BLD AUTO: 67.1 %
PLATELET # BLD AUTO: 179 10E9/L (ref 150–450)
POTASSIUM SERPL-SCNC: 4.8 MMOL/L (ref 3.4–5.3)
RBC # BLD AUTO: 4.61 10E12/L (ref 4.4–5.9)
SODIUM SERPL-SCNC: 141 MMOL/L (ref 133–144)
WBC # BLD AUTO: 2.9 10E9/L (ref 4–11)

## 2018-04-11 PROCEDURE — 87799 DETECT AGENT NOS DNA QUANT: CPT | Performed by: INTERNAL MEDICINE

## 2018-04-11 PROCEDURE — 36415 COLL VENOUS BLD VENIPUNCTURE: CPT | Performed by: INTERNAL MEDICINE

## 2018-04-11 PROCEDURE — 85025 COMPLETE CBC W/AUTO DIFF WBC: CPT | Performed by: FAMILY MEDICINE

## 2018-04-11 PROCEDURE — 80048 BASIC METABOLIC PNL TOTAL CA: CPT | Performed by: INTERNAL MEDICINE

## 2018-04-12 LAB
CMV DNA SPEC NAA+PROBE-ACNC: NORMAL [IU]/ML
CMV DNA SPEC NAA+PROBE-LOG#: NORMAL {LOG_IU}/ML
EBV DNA # SPEC NAA+PROBE: NORMAL {COPIES}/ML
EBV DNA SPEC NAA+PROBE-LOG#: NORMAL {LOG_COPIES}/ML
SPECIMEN SOURCE: NORMAL

## 2018-05-02 ENCOUNTER — TELEPHONE (OUTPATIENT)
Dept: NEPHROLOGY | Facility: CLINIC | Age: 73
End: 2018-05-02

## 2018-05-02 NOTE — TELEPHONE ENCOUNTER
Spoke with patient for transplant follow up. States he's doing very well. Had additional labs drawn earlier this month due to an abnormal WBC count (he had a cold). No questions at this time.    Marlyn Walker RN

## 2018-05-03 ASSESSMENT — ENCOUNTER SYMPTOMS
EYE WATERING: 0
EYE PAIN: 0
EYE IRRITATION: 0
DOUBLE VISION: 0
EYE REDNESS: 0

## 2018-05-07 ENCOUNTER — OFFICE VISIT (OUTPATIENT)
Dept: NEPHROLOGY | Facility: CLINIC | Age: 73
End: 2018-05-07
Attending: INTERNAL MEDICINE
Payer: MEDICARE

## 2018-05-07 VITALS
OXYGEN SATURATION: 99 % | BODY MASS INDEX: 23.4 KG/M2 | WEIGHT: 154.4 LBS | HEART RATE: 70 BPM | SYSTOLIC BLOOD PRESSURE: 143 MMHG | DIASTOLIC BLOOD PRESSURE: 76 MMHG | HEIGHT: 68 IN

## 2018-05-07 DIAGNOSIS — D70.8 OTHER NEUTROPENIA (H): ICD-10-CM

## 2018-05-07 DIAGNOSIS — D84.9 IMMUNOSUPPRESSED STATUS (H): ICD-10-CM

## 2018-05-07 DIAGNOSIS — Z94.0 KIDNEY REPLACED BY TRANSPLANT: Primary | ICD-10-CM

## 2018-05-07 DIAGNOSIS — Z48.298 AFTERCARE FOLLOWING ORGAN TRANSPLANT: ICD-10-CM

## 2018-05-07 PROCEDURE — G0463 HOSPITAL OUTPT CLINIC VISIT: HCPCS | Mod: ZF

## 2018-05-07 ASSESSMENT — PAIN SCALES - GENERAL: PAINLEVEL: NO PAIN (0)

## 2018-05-07 NOTE — NURSING NOTE
"Chief Complaint   Patient presents with     RECHECK     kidney tx      /76  Pulse 70  Ht 1.727 m (5' 8\")  Wt 70 kg (154 lb 6.4 oz)  SpO2 99%  BMI 23.48 kg/m2  Vic Barros, CMA'    "

## 2018-05-07 NOTE — LETTER
"5/7/2018      RE: Vikram Wetzel  [address] [facility] [address]       Assessment and Plan:  1. DDKT - baseline Cr ~ 1.3-1.5, which has remained stable to improved with latest labs.  No proteinuria.  No DSA.  Will make no changes in immunosuppression.  2. BP - okay control right at target of less than 140/90 off antihypertensive medications.  Will make no changes at this time, but recommend patient check BP at home.  3. Leukopenia - low, but stable WBC.  Likely related to medications.  Will follow.  4. Paroxysmal atrial fibrillation - no recent episodes.  5. Skin cancer risk - no new skin lesions.  Recommend regular follow up with Dermatology.  6. Recommend return visit in 12 months.    Assessment and plan was discussed with patient and he voiced his understanding and agreement.    Reason for Visit:  Mr. Wetzel is here for routine follow up.    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         Transplant Hx:       Tx: DDKT  Date: 5/3/14       Present Maintenance IS: Tacrolimus and Mycophenolate mofetil       Baseline Creatinine: 1.3-1.5       Recent DSA: No  Date last checked: 12/2017       Biopsy: No    Mr. Wetzel reports feeling good overall with minimal medical complaints.  Since last clinic visit, patient reports no hospitalizations or new medical complaints and has been doing well overall.  His energy level is good and remains normal.  He is active and does get regular exercise using the treadmill about 5 days a week.  Denies any chest pain or shortness of breath with exertion.  No palpitations.  Appetite is \"too good,\" but his weight is stable.  No nausea, vomiting or diarrhea.  No fever, sweats or chills.  No leg swelling.    Home BP: Not checked.      ROS:   A comprehensive review of systems was obtained and negative, except as noted in the HPI or PMH.    Active Medical Problems:  Patient Active Problem List   Diagnosis     " "Leukopenia     Dyslipidemia     Paroxysmal atrial fibrillation (H)     Immunosuppressed status (H)     Kidney replaced by transplant     AV block     Hemorrhoids, unspecified hemorrhoid type     Aftercare following organ transplant       Personal Hx:  Social History     Social History     Marital status:      Spouse name: N/A     Number of children: N/A     Years of education: N/A     Occupational History     Not on file.     Social History Main Topics     Smoking status: Never Smoker     Smokeless tobacco: Never Used     Alcohol use No     Drug use: No     Sexual activity: Yes     Partners: Female     Other Topics Concern     Caffeine Concern No     occ     Special Diet No     Exercise Yes     walking 5x per week - 1 hour each day     Social History Narrative       Allergies:  No Known Allergies    Medications:  Prior to Admission medications    Medication Sig Start Date End Date Taking? Authorizing Provider   PROGRAF 1 MG capsule Take 1 capsule (1 mg) by mouth 2 times daily 7/22/14   Sarkis Prieto MD   magnesium oxide 400 MG CAPS Take 400 mg by mouth daily 6/5/14   Noy Martines MD   mycophenolate (CELLCEPT-BRAND NAME) 250 MG capsule Take 4 capsules (1,000 mg) by mouth 2 times daily 5/6/14   Madeleine Levi PA-C   sulfamethoxazole-trimethoprim (BACTRIM,SEPTRA) 400-80 MG per tablet Take 1 tablet by mouth daily 5/6/14   Madeleine Levi PA-C   METOPROLOL SUCCINATE ER PO Take 25 mg by mouth 2 times daily    Reported, Patient   aspirin 81 MG tablet Take 1 tablet by mouth daily.    Reported, Patient       Vitals:  /76  Pulse 70  Ht 1.727 m (5' 8\")  Wt 70 kg (154 lb 6.4 oz)  SpO2 99%  BMI 23.48 kg/m2    Exam:   GENERAL APPEARANCE: alert and no distress  HENT: mouth without ulcers or lesions  LYMPHATICS: no cervical or supraclavicular nodes  RESP: lungs clear to auscultation - no rales, rhonchi or wheezes  CV: regular rhythm, normal rate, no rub, no murmur  EDEMA: no LE edema " bilaterally  ABDOMEN: soft, nondistended, nontender, bowel sounds normal  MS: extremities normal - no gross deformities noted, no evidence of inflammation in joints, no muscle tenderness; left upper extremity AV fistula with good thrill  SKIN: no rash  TX KIDNEY: normal    Results:   Recent Results (from the past 840 hour(s))   Basic metabolic panel    Collection Time: 04/04/18  7:50 AM   Result Value Ref Range    Sodium 135 133 - 144 mmol/L    Potassium 4.7 3.4 - 5.3 mmol/L    Chloride 104 94 - 109 mmol/L    Carbon Dioxide 28 20 - 32 mmol/L    Anion Gap 3 3 - 14 mmol/L    Glucose 90 70 - 99 mg/dL    Urea Nitrogen 23 7 - 30 mg/dL    Creatinine 1.35 (H) 0.66 - 1.25 mg/dL    GFR Estimate 52 (L) >60 mL/min/1.7m2    GFR Estimate If Black 63 >60 mL/min/1.7m2    Calcium 9.4 8.5 - 10.1 mg/dL   Tacrolimus level    Collection Time: 04/04/18  7:50 AM   Result Value Ref Range    Tacrolimus Last Dose 2000     Tacrolimus Level 5.3 5.0 - 15.0 ug/L   CBC with platelets differential    Collection Time: 04/04/18  7:50 AM   Result Value Ref Range    WBC 1.7 (L) 4.0 - 11.0 10e9/L    RBC Count 4.51 4.4 - 5.9 10e12/L    Hemoglobin 13.1 (L) 13.3 - 17.7 g/dL    Hematocrit 41.3 40.0 - 53.0 %    MCV 92 78 - 100 fl    MCH 29.0 26.5 - 33.0 pg    MCHC 31.7 31.5 - 36.5 g/dL    RDW 12.9 10.0 - 15.0 %    Platelet Count 113 (L) 150 - 450 10e9/L    Diff Method Automated Method     % Neutrophils 49.1 %    % Lymphocytes 31.7 %    % Monocytes 15.6 %    % Eosinophils 3.0 %    % Basophils 0.6 %    Absolute Neutrophil 0.8 (L) 1.6 - 8.3 10e9/L    Absolute Lymphocytes 0.5 (L) 0.8 - 5.3 10e9/L    Absolute Monocytes 0.3 0.0 - 1.3 10e9/L    Absolute Eosinophils 0.1 0.0 - 0.7 10e9/L    Absolute Basophils 0.0 0.0 - 0.2 10e9/L   Basic metabolic panel    Collection Time: 04/11/18  8:03 AM   Result Value Ref Range    Sodium 141 133 - 144 mmol/L    Potassium 4.8 3.4 - 5.3 mmol/L    Chloride 110 (H) 94 - 109 mmol/L    Carbon Dioxide 24 20 - 32 mmol/L    Anion Gap 7  3 - 14 mmol/L    Glucose 101 (H) 70 - 99 mg/dL    Urea Nitrogen 24 7 - 30 mg/dL    Creatinine 1.17 0.66 - 1.25 mg/dL    GFR Estimate 61 >60 mL/min/1.7m2    GFR Estimate If Black 74 >60 mL/min/1.7m2    Calcium 9.6 8.5 - 10.1 mg/dL   CBC with platelets differential    Collection Time: 04/11/18  8:03 AM   Result Value Ref Range    WBC 2.9 (L) 4.0 - 11.0 10e9/L    RBC Count 4.61 4.4 - 5.9 10e12/L    Hemoglobin 13.3 13.3 - 17.7 g/dL    Hematocrit 42.0 40.0 - 53.0 %    MCV 91 78 - 100 fl    MCH 28.9 26.5 - 33.0 pg    MCHC 31.7 31.5 - 36.5 g/dL    RDW 13.1 10.0 - 15.0 %    Platelet Count 179 150 - 450 10e9/L    Diff Method Automated Method     % Neutrophils 67.1 %    % Lymphocytes 19.4 %    % Monocytes 11.1 %    % Eosinophils 2.1 %    % Basophils 0.3 %    Absolute Neutrophil 1.9 1.6 - 8.3 10e9/L    Absolute Lymphocytes 0.6 (L) 0.8 - 5.3 10e9/L    Absolute Monocytes 0.3 0.0 - 1.3 10e9/L    Absolute Eosinophils 0.1 0.0 - 0.7 10e9/L    Absolute Basophils 0.0 0.0 - 0.2 10e9/L   EBV DNA PCR Quantitative Whole Blood    Collection Time: 04/11/18  8:03 AM   Result Value Ref Range    EBV DNA Copies/mL EBV DNA Not Detected EBVNEG^EBV DNA Not Detected [Copies]/mL    EBV DNA Log of Copies Not Calculated <2.7 [Log_copies]/mL   BK virus PCR quantitative    Collection Time: 04/11/18  8:04 AM   Result Value Ref Range    BK Virus Specimen Plasma     BK Virus Result BK Virus DNA Not Detected BKNEG^BK Virus DNA Not Detected copies/mL    BK Virus Log Not Calculated <2.7 Log copies/mL   CMV DNA quantification    Collection Time: 04/11/18  8:05 AM   Result Value Ref Range    CMV DNA Quantitation Specimen EDTA PLASMA     CMV Quant IU/mL CMV DNA Not Detected CMVND^CMV DNA Not Detected [IU]/mL    Log IU/mL of CMVQNT Not Calculated <2.1 [Log_IU]/mL       Sarkis Prieto MD

## 2018-05-07 NOTE — MR AVS SNAPSHOT
After Visit Summary   5/7/2018    Vikram Wetzel    MRN: [id number]           Patient Information     Date Of Birth          1945        Visit Information        Provider Department      5/7/2018 1:05 PM Sarkis Prieto MD [facility]        Today's Diagnoses     Kidney replaced by transplant    -  1    Aftercare following organ transplant        Immunosuppressed status (H)        Other neutropenia (H)           Follow-ups after your visit        Follow-up notes from your care team     Return in about 1 year (around 5/7/2019).      Your next 10 appointments already scheduled     May 07, 2018  1:05 PM CDT   (Arrive by 12:35 PM)   Return Kidney Transplant with Sarkis Prieto MD   [facility] ([facility])    [address] [place] [address]  [address] [place] [address] [phone]            Jun 06, 2018  8:00 AM CDT   LAB with CR LAB   [facility] ([facility])    [address] [place] [address] [phone]           Please do not eat 10-12 hours before your appointment if you are coming in fasting for labs on lipids, cholesterol, or glucose (sugar). This does not apply to pregnant women. Water, hot tea and black coffee (with nothing added) are okay. Do not drink other fluids, diet soda or chew gum.            May 07, 2019  1:05 PM CDT   (Arrive by 12:35 PM)   Return Kidney Transplant with Uc Kidney/Pancreas Recipient   [facility] ([facility])    [address] [place] [address]  [address]  [facility] [address] [phone]              Who to contact     If you have questions or need follow up information about today's clinic visit or your schedule please contact [facility] directly at [phone].  Normal or non-critical lab and imaging results will be communicated to you by MyChart, letter or phone within 4 business days after the clinic has  "received the results. If you do not hear from us within 7 days, please contact the clinic through [employer] or phone. If you have a critical or abnormal lab result, we will notify you by phone as soon as possible.  Submit refill requests through [employer] or call your pharmacy and they will forward the refill request to us. Please allow 3 business days for your refill to be completed.          Additional Information About Your Visit        [employer] Information     [employer] gives you secure access to your electronic health record. If you see a primary care provider, you can also send messages to your care team and make appointments. If you have questions, please call your primary care clinic.  If you do not have a primary care provider, please call [phone] and they will assist you.        Care EveryWhere ID     This is your Care EveryWhere ID. This could be used by other organizations to access your [place] medical records  [id number]        Your Vitals Were     Pulse Height Pulse Oximetry BMI (Body Mass Index)          70 1.727 m (5' 8\") 99% 23.48 kg/m2         Blood Pressure from Last 3 Encounters:   05/07/18 143/76   05/08/17 132/71   05/09/16 120/71    Weight from Last 3 Encounters:   05/07/18 70 kg (154 lb 6.4 oz)   05/08/17 69.9 kg (154 lb)   05/09/16 71.9 kg (158 lb 9.6 oz)              Today, you had the following     No orders found for display         Today's Medication Changes          These changes are accurate as of 5/7/18 12:57 PM.  If you have any questions, ask your nurse or doctor.               These medicines have changed or have updated prescriptions.        Dose/Directions    sulfamethoxazole-trimethoprim 400-80 MG per tablet   Commonly known as:  BACTRIM/SEPTRA   This may have changed:  Another medication with the same name was removed. Continue taking this medication, and follow the directions you see here.   Used for:  Status post kidney transplant        TAKE ONE TABLET BY MOUTH THREE TIMES " A WEEK   Quantity:  36 tablet   Refills:  3                Primary Care Provider Fax #    Physician No Ref-Primary [phone]       No address on file        Equal Access to Services     GABRIEL GUARDADO : Hadii aad ku hadvanesayamilka Vitale, donnalazaro williamacha, alessandra lilianajaime norman, marty saenzflorinda neha. So [facility] [phone].    ATENCIÓN: Si habla español, tiene a parra disposición servicios gratuitos de asistencia lingüística. Llame al [phone].    We comply with applicable federal civil rights laws and [place] laws. We do not discriminate on the basis of race, color, national origin, age, disability, sex, sexual orientation, or gender identity.            Thank you!     Thank you for choosing [facility]  for your care. Our goal is always to provide you with excellent care. Hearing back from our patients is one way we can continue to improve our services. Please take a few minutes to complete the written survey that you may receive in the mail after your visit with us. Thank you!             Your Updated Medication List - Protect others around you: Learn how to safely use, store and throw away your medicines at www.disposemymeds.org.          This list is accurate as of 5/7/18 12:57 PM.  Always use your most recent med list.                   Brand Name Dispense Instructions for use Diagnosis    aspirin 81 MG tablet      Take 1 tablet by mouth daily.        mycophenolate 250 MG capsule     120 capsule    Take 2 capsules (500 mg) by mouth 2 times daily    Kidney transplanted       sulfamethoxazole-trimethoprim 400-80 MG per tablet    BACTRIM/SEPTRA    36 tablet    TAKE ONE TABLET BY MOUTH THREE TIMES A WEEK    Status post kidney transplant       * tacrolimus 0.5 MG capsule     60 capsule    Take 1 capsule (0.5 mg) by mouth 2 times daily Total dose = 1.5 mg BID    Kidney transplanted       * tacrolimus 1 MG capsule     60 capsule    Take 1 capsule (1 mg) by mouth 2 times daily Total dose =  1.5 mg BID    Kidney transplanted       * Notice:  This list has 2 medication(s) that are the same as other medications prescribed for you. Read the directions carefully, and ask your doctor or other care provider to review them with you.

## 2018-05-07 NOTE — PROGRESS NOTES
"Assessment and Plan:  1. DDKT - baseline Cr ~ 1.3-1.5, which has remained stable to improved with latest labs.  No proteinuria.  No DSA.  Will make no changes in immunosuppression.  2. BP - okay control right at target of less than 140/90 off antihypertensive medications.  Will make no changes at this time, but recommend patient check BP at home.  3. Leukopenia - low, but stable WBC.  Likely related to medications.  Will follow.  4. Paroxysmal atrial fibrillation - no recent episodes.  5. Skin cancer risk - no new skin lesions.  Recommend regular follow up with Dermatology.  6. Recommend return visit in 12 months.    Assessment and plan was discussed with patient and he voiced his understanding and agreement.    Reason for Visit:  Mr. Wetzel is here for routine follow up.    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         Transplant Hx:       Tx: DDKT  Date: 5/3/14       Present Maintenance IS: Tacrolimus and Mycophenolate mofetil       Baseline Creatinine: 1.3-1.5       Recent DSA: No  Date last checked: 12/2017       Biopsy: No    Mr. Wetzel reports feeling good overall with minimal medical complaints.  Since last clinic visit, patient reports no hospitalizations or new medical complaints and has been doing well overall.  His energy level is good and remains normal.  He is active and does get regular exercise using the treadmill about 5 days a week.  Denies any chest pain or shortness of breath with exertion.  No palpitations.  Appetite is \"too good,\" but his weight is stable.  No nausea, vomiting or diarrhea.  No fever, sweats or chills.  No leg swelling.    Home BP: Not checked.      ROS:   A comprehensive review of systems was obtained and negative, except as noted in the HPI or PMH.    Active Medical Problems:  Patient Active Problem List   Diagnosis     Leukopenia     Dyslipidemia     Paroxysmal atrial fibrillation (H)     Immunosuppressed " "status (H)     Kidney replaced by transplant     AV block     Hemorrhoids, unspecified hemorrhoid type     Aftercare following organ transplant       Personal Hx:  Social History     Social History     Marital status:      Spouse name: N/A     Number of children: N/A     Years of education: N/A     Occupational History     Not on file.     Social History Main Topics     Smoking status: Never Smoker     Smokeless tobacco: Never Used     Alcohol use No     Drug use: No     Sexual activity: Yes     Partners: Female     Other Topics Concern     Caffeine Concern No     occ     Special Diet No     Exercise Yes     walking 5x per week - 1 hour each day     Social History Narrative       Allergies:  No Known Allergies    Medications:  Prior to Admission medications    Medication Sig Start Date End Date Taking? Authorizing Provider   PROGRAF 1 MG capsule Take 1 capsule (1 mg) by mouth 2 times daily 7/22/14   Sarkis Prieto MD   magnesium oxide 400 MG CAPS Take 400 mg by mouth daily 6/5/14   Noy Martines MD   mycophenolate (CELLCEPT-BRAND NAME) 250 MG capsule Take 4 capsules (1,000 mg) by mouth 2 times daily 5/6/14   Madeleine Levi PA-C   sulfamethoxazole-trimethoprim (BACTRIM,SEPTRA) 400-80 MG per tablet Take 1 tablet by mouth daily 5/6/14   Madeleine Levi PA-C   METOPROLOL SUCCINATE ER PO Take 25 mg by mouth 2 times daily    Reported, Patient   aspirin 81 MG tablet Take 1 tablet by mouth daily.    Reported, Patient       Vitals:  /76  Pulse 70  Ht 1.727 m (5' 8\")  Wt 70 kg (154 lb 6.4 oz)  SpO2 99%  BMI 23.48 kg/m2    Exam:   GENERAL APPEARANCE: alert and no distress  HENT: mouth without ulcers or lesions  LYMPHATICS: no cervical or supraclavicular nodes  RESP: lungs clear to auscultation - no rales, rhonchi or wheezes  CV: regular rhythm, normal rate, no rub, no murmur  EDEMA: no LE edema bilaterally  ABDOMEN: soft, nondistended, nontender, bowel sounds normal  MS: " extremities normal - no gross deformities noted, no evidence of inflammation in joints, no muscle tenderness; left upper extremity AV fistula with good thrill  SKIN: no rash  TX KIDNEY: normal    Results:   Recent Results (from the past 840 hour(s))   Basic metabolic panel    Collection Time: 04/04/18  7:50 AM   Result Value Ref Range    Sodium 135 133 - 144 mmol/L    Potassium 4.7 3.4 - 5.3 mmol/L    Chloride 104 94 - 109 mmol/L    Carbon Dioxide 28 20 - 32 mmol/L    Anion Gap 3 3 - 14 mmol/L    Glucose 90 70 - 99 mg/dL    Urea Nitrogen 23 7 - 30 mg/dL    Creatinine 1.35 (H) 0.66 - 1.25 mg/dL    GFR Estimate 52 (L) >60 mL/min/1.7m2    GFR Estimate If Black 63 >60 mL/min/1.7m2    Calcium 9.4 8.5 - 10.1 mg/dL   Tacrolimus level    Collection Time: 04/04/18  7:50 AM   Result Value Ref Range    Tacrolimus Last Dose 2000     Tacrolimus Level 5.3 5.0 - 15.0 ug/L   CBC with platelets differential    Collection Time: 04/04/18  7:50 AM   Result Value Ref Range    WBC 1.7 (L) 4.0 - 11.0 10e9/L    RBC Count 4.51 4.4 - 5.9 10e12/L    Hemoglobin 13.1 (L) 13.3 - 17.7 g/dL    Hematocrit 41.3 40.0 - 53.0 %    MCV 92 78 - 100 fl    MCH 29.0 26.5 - 33.0 pg    MCHC 31.7 31.5 - 36.5 g/dL    RDW 12.9 10.0 - 15.0 %    Platelet Count 113 (L) 150 - 450 10e9/L    Diff Method Automated Method     % Neutrophils 49.1 %    % Lymphocytes 31.7 %    % Monocytes 15.6 %    % Eosinophils 3.0 %    % Basophils 0.6 %    Absolute Neutrophil 0.8 (L) 1.6 - 8.3 10e9/L    Absolute Lymphocytes 0.5 (L) 0.8 - 5.3 10e9/L    Absolute Monocytes 0.3 0.0 - 1.3 10e9/L    Absolute Eosinophils 0.1 0.0 - 0.7 10e9/L    Absolute Basophils 0.0 0.0 - 0.2 10e9/L   Basic metabolic panel    Collection Time: 04/11/18  8:03 AM   Result Value Ref Range    Sodium 141 133 - 144 mmol/L    Potassium 4.8 3.4 - 5.3 mmol/L    Chloride 110 (H) 94 - 109 mmol/L    Carbon Dioxide 24 20 - 32 mmol/L    Anion Gap 7 3 - 14 mmol/L    Glucose 101 (H) 70 - 99 mg/dL    Urea Nitrogen 24 7 - 30  mg/dL    Creatinine 1.17 0.66 - 1.25 mg/dL    GFR Estimate 61 >60 mL/min/1.7m2    GFR Estimate If Black 74 >60 mL/min/1.7m2    Calcium 9.6 8.5 - 10.1 mg/dL   CBC with platelets differential    Collection Time: 04/11/18  8:03 AM   Result Value Ref Range    WBC 2.9 (L) 4.0 - 11.0 10e9/L    RBC Count 4.61 4.4 - 5.9 10e12/L    Hemoglobin 13.3 13.3 - 17.7 g/dL    Hematocrit 42.0 40.0 - 53.0 %    MCV 91 78 - 100 fl    MCH 28.9 26.5 - 33.0 pg    MCHC 31.7 31.5 - 36.5 g/dL    RDW 13.1 10.0 - 15.0 %    Platelet Count 179 150 - 450 10e9/L    Diff Method Automated Method     % Neutrophils 67.1 %    % Lymphocytes 19.4 %    % Monocytes 11.1 %    % Eosinophils 2.1 %    % Basophils 0.3 %    Absolute Neutrophil 1.9 1.6 - 8.3 10e9/L    Absolute Lymphocytes 0.6 (L) 0.8 - 5.3 10e9/L    Absolute Monocytes 0.3 0.0 - 1.3 10e9/L    Absolute Eosinophils 0.1 0.0 - 0.7 10e9/L    Absolute Basophils 0.0 0.0 - 0.2 10e9/L   EBV DNA PCR Quantitative Whole Blood    Collection Time: 04/11/18  8:03 AM   Result Value Ref Range    EBV DNA Copies/mL EBV DNA Not Detected EBVNEG^EBV DNA Not Detected [Copies]/mL    EBV DNA Log of Copies Not Calculated <2.7 [Log_copies]/mL   BK virus PCR quantitative    Collection Time: 04/11/18  8:04 AM   Result Value Ref Range    BK Virus Specimen Plasma     BK Virus Result BK Virus DNA Not Detected BKNEG^BK Virus DNA Not Detected copies/mL    BK Virus Log Not Calculated <2.7 Log copies/mL   CMV DNA quantification    Collection Time: 04/11/18  8:05 AM   Result Value Ref Range    CMV DNA Quantitation Specimen EDTA PLASMA     CMV Quant IU/mL CMV DNA Not Detected CMVND^CMV DNA Not Detected [IU]/mL    Log IU/mL of CMVQNT Not Calculated <2.1 [Log_IU]/mL

## 2018-05-21 ENCOUNTER — CARE COORDINATION (OUTPATIENT)
Dept: NEPHROLOGY | Facility: CLINIC | Age: 73
End: 2018-05-21

## 2018-05-21 NOTE — PROGRESS NOTES
Reason for Call    Spoke with patient for BP follow up. His readings are averaging 110-120's / 60's (pulse 70's). He's had two readings around 130/70. Feels great, no symptoms.    Collaboration    Above discussed with Dr. Prieto.     Patient Education    1. Call this nurse if systolic (top number) blood pressure is consistently <110 or >160 for further instructions.     Plan    1. Continue to monitor BP  2. Follow up if any changes with BP readings or symptoms    Patient was given an opportunity to ask questions and have those questions answered to his satisfaction.  Patient verbalized understanding of instructions provided and agreed to plan of care.    Marlyn Walker RN

## 2018-06-06 ENCOUNTER — RESULTS ONLY (OUTPATIENT)
Dept: OTHER | Facility: CLINIC | Age: 73
End: 2018-06-06

## 2018-06-06 DIAGNOSIS — Z79.899 ENCOUNTER FOR LONG-TERM CURRENT USE OF MEDICATION: ICD-10-CM

## 2018-06-06 DIAGNOSIS — Z48.298 AFTERCARE FOLLOWING ORGAN TRANSPLANT: ICD-10-CM

## 2018-06-06 DIAGNOSIS — Z94.0 KIDNEY REPLACED BY TRANSPLANT: ICD-10-CM

## 2018-06-06 LAB
ANION GAP SERPL CALCULATED.3IONS-SCNC: 7 MMOL/L (ref 3–14)
BUN SERPL-MCNC: 23 MG/DL (ref 7–30)
CALCIUM SERPL-MCNC: 9.6 MG/DL (ref 8.5–10.1)
CHLORIDE SERPL-SCNC: 106 MMOL/L (ref 94–109)
CO2 SERPL-SCNC: 25 MMOL/L (ref 20–32)
CREAT SERPL-MCNC: 1.27 MG/DL (ref 0.66–1.25)
CREAT UR-MCNC: 69 MG/DL
ERYTHROCYTE [DISTWIDTH] IN BLOOD BY AUTOMATED COUNT: 13.4 % (ref 10–15)
GFR SERPL CREATININE-BSD FRML MDRD: 56 ML/MIN/1.7M2
GLUCOSE SERPL-MCNC: 103 MG/DL (ref 70–99)
HCT VFR BLD AUTO: 42.8 % (ref 40–53)
HGB BLD-MCNC: 13.9 G/DL (ref 13.3–17.7)
MCH RBC QN AUTO: 29.3 PG (ref 26.5–33)
MCHC RBC AUTO-ENTMCNC: 32.5 G/DL (ref 31.5–36.5)
MCV RBC AUTO: 90 FL (ref 78–100)
PLATELET # BLD AUTO: 130 10E9/L (ref 150–450)
POTASSIUM SERPL-SCNC: 4.5 MMOL/L (ref 3.4–5.3)
PROT UR-MCNC: 0.06 G/L
PROT/CREAT 24H UR: 0.09 G/G CR (ref 0–0.2)
RBC # BLD AUTO: 4.74 10E12/L (ref 4.4–5.9)
SODIUM SERPL-SCNC: 138 MMOL/L (ref 133–144)
TACROLIMUS BLD-MCNC: 5 UG/L (ref 5–15)
TME LAST DOSE: NORMAL H
WBC # BLD AUTO: 3 10E9/L (ref 4–11)

## 2018-06-06 PROCEDURE — 84156 ASSAY OF PROTEIN URINE: CPT | Performed by: INTERNAL MEDICINE

## 2018-06-06 PROCEDURE — 36415 COLL VENOUS BLD VENIPUNCTURE: CPT | Performed by: INTERNAL MEDICINE

## 2018-06-06 PROCEDURE — 80197 ASSAY OF TACROLIMUS: CPT | Performed by: INTERNAL MEDICINE

## 2018-06-06 PROCEDURE — 86833 HLA CLASS II HIGH DEFIN QUAL: CPT | Performed by: INTERNAL MEDICINE

## 2018-06-06 PROCEDURE — 80048 BASIC METABOLIC PNL TOTAL CA: CPT | Performed by: INTERNAL MEDICINE

## 2018-06-06 PROCEDURE — 86832 HLA CLASS I HIGH DEFIN QUAL: CPT | Performed by: INTERNAL MEDICINE

## 2018-06-06 PROCEDURE — 87799 DETECT AGENT NOS DNA QUANT: CPT | Performed by: INTERNAL MEDICINE

## 2018-06-06 PROCEDURE — 85027 COMPLETE CBC AUTOMATED: CPT | Performed by: INTERNAL MEDICINE

## 2018-06-07 LAB — PRA DONOR SPECIFIC ABY: NORMAL

## 2018-06-08 LAB
BKV DNA # SPEC NAA+PROBE: NORMAL COPIES/ML
BKV DNA SPEC NAA+PROBE-LOG#: NORMAL LOG COPIES/ML
DONOR IDENTIFICATION: NORMAL
DSA COMMENTS: NORMAL
DSA PRESENT: NO
DSA TEST METHOD: NORMAL
ORGAN: NORMAL
SA1 CELL: NORMAL
SA1 COMMENTS: NORMAL
SA1 HI RISK ABY: NORMAL
SA1 MOD RISK ABY: NORMAL
SA1 TEST METHOD: NORMAL
SA2 CELL: NORMAL
SA2 COMMENTS: NORMAL
SA2 HI RISK ABY UA: NORMAL
SA2 MOD RISK ABY: NORMAL
SA2 TEST METHOD: NORMAL
SPECIMEN SOURCE: NORMAL

## 2018-08-01 DIAGNOSIS — Z48.298 AFTERCARE FOLLOWING ORGAN TRANSPLANT: ICD-10-CM

## 2018-08-01 DIAGNOSIS — Z79.899 ENCOUNTER FOR LONG-TERM CURRENT USE OF MEDICATION: ICD-10-CM

## 2018-08-01 DIAGNOSIS — Z94.0 KIDNEY REPLACED BY TRANSPLANT: ICD-10-CM

## 2018-08-01 LAB
ERYTHROCYTE [DISTWIDTH] IN BLOOD BY AUTOMATED COUNT: 13.1 % (ref 10–15)
HCT VFR BLD AUTO: 41.5 % (ref 40–53)
HGB BLD-MCNC: 13.3 G/DL (ref 13.3–17.7)
MCH RBC QN AUTO: 29 PG (ref 26.5–33)
MCHC RBC AUTO-ENTMCNC: 32 G/DL (ref 31.5–36.5)
MCV RBC AUTO: 91 FL (ref 78–100)
PLATELET # BLD AUTO: 128 10E9/L (ref 150–450)
RBC # BLD AUTO: 4.58 10E12/L (ref 4.4–5.9)
TACROLIMUS BLD-MCNC: 6.3 UG/L (ref 5–15)
TME LAST DOSE: NORMAL H
WBC # BLD AUTO: 2.6 10E9/L (ref 4–11)

## 2018-08-01 PROCEDURE — 80197 ASSAY OF TACROLIMUS: CPT | Performed by: INTERNAL MEDICINE

## 2018-08-01 PROCEDURE — 36415 COLL VENOUS BLD VENIPUNCTURE: CPT | Performed by: INTERNAL MEDICINE

## 2018-08-01 PROCEDURE — 85027 COMPLETE CBC AUTOMATED: CPT | Performed by: INTERNAL MEDICINE

## 2018-08-01 PROCEDURE — 80048 BASIC METABOLIC PNL TOTAL CA: CPT | Performed by: INTERNAL MEDICINE

## 2018-08-02 LAB
ANION GAP SERPL CALCULATED.3IONS-SCNC: 8 MMOL/L (ref 3–14)
BUN SERPL-MCNC: 21 MG/DL (ref 7–30)
CALCIUM SERPL-MCNC: 9.5 MG/DL (ref 8.5–10.1)
CHLORIDE SERPL-SCNC: 107 MMOL/L (ref 94–109)
CO2 SERPL-SCNC: 26 MMOL/L (ref 20–32)
CREAT SERPL-MCNC: 1.33 MG/DL (ref 0.66–1.25)
GFR SERPL CREATININE-BSD FRML MDRD: 53 ML/MIN/1.7M2
GLUCOSE SERPL-MCNC: 95 MG/DL (ref 70–99)
POTASSIUM SERPL-SCNC: 4.6 MMOL/L (ref 3.4–5.3)
SODIUM SERPL-SCNC: 141 MMOL/L (ref 133–144)

## 2018-10-03 DIAGNOSIS — Z48.298 AFTERCARE FOLLOWING ORGAN TRANSPLANT: ICD-10-CM

## 2018-10-03 DIAGNOSIS — Z79.899 ENCOUNTER FOR LONG-TERM CURRENT USE OF MEDICATION: ICD-10-CM

## 2018-10-03 DIAGNOSIS — Z94.0 KIDNEY REPLACED BY TRANSPLANT: ICD-10-CM

## 2018-10-03 LAB
ANION GAP SERPL CALCULATED.3IONS-SCNC: 6 MMOL/L (ref 3–14)
BUN SERPL-MCNC: 28 MG/DL (ref 7–30)
CALCIUM SERPL-MCNC: 9.7 MG/DL (ref 8.5–10.1)
CHLORIDE SERPL-SCNC: 105 MMOL/L (ref 94–109)
CO2 SERPL-SCNC: 26 MMOL/L (ref 20–32)
CREAT SERPL-MCNC: 1.24 MG/DL (ref 0.66–1.25)
ERYTHROCYTE [DISTWIDTH] IN BLOOD BY AUTOMATED COUNT: 13.3 % (ref 10–15)
GFR SERPL CREATININE-BSD FRML MDRD: 57 ML/MIN/1.7M2
GLUCOSE SERPL-MCNC: 88 MG/DL (ref 70–99)
HCT VFR BLD AUTO: 44 % (ref 40–53)
HGB BLD-MCNC: 13.8 G/DL (ref 13.3–17.7)
MCH RBC QN AUTO: 29.1 PG (ref 26.5–33)
MCHC RBC AUTO-ENTMCNC: 31.4 G/DL (ref 31.5–36.5)
MCV RBC AUTO: 93 FL (ref 78–100)
PLATELET # BLD AUTO: 128 10E9/L (ref 150–450)
POTASSIUM SERPL-SCNC: 5.2 MMOL/L (ref 3.4–5.3)
RBC # BLD AUTO: 4.75 10E12/L (ref 4.4–5.9)
SODIUM SERPL-SCNC: 137 MMOL/L (ref 133–144)
TACROLIMUS BLD-MCNC: 5 UG/L (ref 5–15)
TME LAST DOSE: NORMAL H
WBC # BLD AUTO: 2.7 10E9/L (ref 4–11)

## 2018-10-03 PROCEDURE — 85027 COMPLETE CBC AUTOMATED: CPT | Performed by: INTERNAL MEDICINE

## 2018-10-03 PROCEDURE — 36415 COLL VENOUS BLD VENIPUNCTURE: CPT | Performed by: INTERNAL MEDICINE

## 2018-10-03 PROCEDURE — 80197 ASSAY OF TACROLIMUS: CPT | Performed by: INTERNAL MEDICINE

## 2018-10-03 PROCEDURE — 80048 BASIC METABOLIC PNL TOTAL CA: CPT | Performed by: INTERNAL MEDICINE

## 2018-10-22 DIAGNOSIS — Z94.0 STATUS POST KIDNEY TRANSPLANT: ICD-10-CM

## 2018-10-22 RX ORDER — SULFAMETHOXAZOLE AND TRIMETHOPRIM 400; 80 MG/1; MG/1
TABLET ORAL
Qty: 36 TABLET | Refills: 3 | Status: SHIPPED | OUTPATIENT
Start: 2018-10-22 | End: 2019-11-15

## 2018-12-05 DIAGNOSIS — Z48.298 AFTERCARE FOLLOWING ORGAN TRANSPLANT: ICD-10-CM

## 2018-12-05 DIAGNOSIS — Z79.899 ENCOUNTER FOR LONG-TERM CURRENT USE OF MEDICATION: ICD-10-CM

## 2018-12-05 DIAGNOSIS — Z94.0 KIDNEY REPLACED BY TRANSPLANT: ICD-10-CM

## 2018-12-05 LAB
ANION GAP SERPL CALCULATED.3IONS-SCNC: 5 MMOL/L (ref 3–14)
BUN SERPL-MCNC: 28 MG/DL (ref 7–30)
CALCIUM SERPL-MCNC: 9.8 MG/DL (ref 8.5–10.1)
CHLORIDE SERPL-SCNC: 106 MMOL/L (ref 94–109)
CO2 SERPL-SCNC: 26 MMOL/L (ref 20–32)
CREAT SERPL-MCNC: 1.22 MG/DL (ref 0.66–1.25)
ERYTHROCYTE [DISTWIDTH] IN BLOOD BY AUTOMATED COUNT: 13 % (ref 10–15)
GFR SERPL CREATININE-BSD FRML MDRD: 58 ML/MIN/1.7M2
GLUCOSE SERPL-MCNC: 78 MG/DL (ref 70–99)
HCT VFR BLD AUTO: 42.3 % (ref 40–53)
HGB BLD-MCNC: 13.3 G/DL (ref 13.3–17.7)
MCH RBC QN AUTO: 29 PG (ref 26.5–33)
MCHC RBC AUTO-ENTMCNC: 31.4 G/DL (ref 31.5–36.5)
MCV RBC AUTO: 92 FL (ref 78–100)
PLATELET # BLD AUTO: 125 10E9/L (ref 150–450)
POTASSIUM SERPL-SCNC: 4.9 MMOL/L (ref 3.4–5.3)
RBC # BLD AUTO: 4.58 10E12/L (ref 4.4–5.9)
SODIUM SERPL-SCNC: 137 MMOL/L (ref 133–144)
TACROLIMUS BLD-MCNC: 3.9 UG/L (ref 5–15)
TME LAST DOSE: ABNORMAL H
WBC # BLD AUTO: 2.7 10E9/L (ref 4–11)

## 2018-12-05 PROCEDURE — 80197 ASSAY OF TACROLIMUS: CPT | Performed by: INTERNAL MEDICINE

## 2018-12-05 PROCEDURE — 85027 COMPLETE CBC AUTOMATED: CPT | Performed by: INTERNAL MEDICINE

## 2018-12-05 PROCEDURE — 36415 COLL VENOUS BLD VENIPUNCTURE: CPT | Performed by: INTERNAL MEDICINE

## 2018-12-05 PROCEDURE — 80048 BASIC METABOLIC PNL TOTAL CA: CPT | Performed by: INTERNAL MEDICINE

## 2018-12-21 DIAGNOSIS — Z94.0 KIDNEY TRANSPLANTED: ICD-10-CM

## 2018-12-21 RX ORDER — MYCOPHENOLATE MOFETIL 250 MG/1
500 CAPSULE ORAL 2 TIMES DAILY
Qty: 120 CAPSULE | Refills: 11 | Status: SHIPPED | OUTPATIENT
Start: 2018-12-21 | End: 2019-12-16

## 2018-12-21 NOTE — TELEPHONE ENCOUNTER
Cellcept 250MG  Qty 120  Last Fill 11/23/2018      Thank you  Joie Menjivar [facility]

## 2019-01-18 DIAGNOSIS — Z94.0 KIDNEY TRANSPLANTED: ICD-10-CM

## 2019-01-18 NOTE — TELEPHONE ENCOUNTER
Drug: Prograf 0.5  Last Fill Date: 12/24/2018  Quantity: 60        Drug: Prograf 1  Last Fill Date: 12/24/2018  Quantity: 60

## 2019-01-21 RX ORDER — TACROLIMUS 1 MG/1
1 CAPSULE, GELATIN COATED ORAL 2 TIMES DAILY
Qty: 60 CAPSULE | Refills: 11 | Status: SHIPPED | OUTPATIENT
Start: 2019-01-21 | End: 2019-06-19

## 2019-01-21 RX ORDER — TACROLIMUS 0.5 MG/1
0.5 CAPSULE, GELATIN COATED ORAL 2 TIMES DAILY
Qty: 60 CAPSULE | Refills: 11 | Status: SHIPPED | OUTPATIENT
Start: 2019-01-21 | End: 2019-06-19

## 2019-02-06 ENCOUNTER — TELEPHONE (OUTPATIENT)
Dept: TRANSPLANT | Facility: CLINIC | Age: 74
End: 2019-02-06

## 2019-02-06 DIAGNOSIS — Z94.0 KIDNEY REPLACED BY TRANSPLANT: ICD-10-CM

## 2019-02-06 DIAGNOSIS — Z79.899 ENCOUNTER FOR LONG-TERM CURRENT USE OF MEDICATION: ICD-10-CM

## 2019-02-06 DIAGNOSIS — Z48.298 AFTERCARE FOLLOWING ORGAN TRANSPLANT: Primary | ICD-10-CM

## 2019-02-06 DIAGNOSIS — Z48.298 AFTERCARE FOLLOWING ORGAN TRANSPLANT: ICD-10-CM

## 2019-02-06 LAB
ERYTHROCYTE [DISTWIDTH] IN BLOOD BY AUTOMATED COUNT: 13.2 % (ref 10–15)
HCT VFR BLD AUTO: 41.2 % (ref 40–53)
HGB BLD-MCNC: 13.4 G/DL (ref 13.3–17.7)
MCH RBC QN AUTO: 29.6 PG (ref 26.5–33)
MCHC RBC AUTO-ENTMCNC: 32.5 G/DL (ref 31.5–36.5)
MCV RBC AUTO: 91 FL (ref 78–100)
PLATELET # BLD AUTO: 129 10E9/L (ref 150–450)
RBC # BLD AUTO: 4.53 10E12/L (ref 4.4–5.9)
TACROLIMUS BLD-MCNC: 4.8 UG/L (ref 5–15)
TME LAST DOSE: ABNORMAL H
WBC # BLD AUTO: 2.8 10E9/L (ref 4–11)

## 2019-02-06 PROCEDURE — 36415 COLL VENOUS BLD VENIPUNCTURE: CPT | Performed by: INTERNAL MEDICINE

## 2019-02-06 PROCEDURE — 80197 ASSAY OF TACROLIMUS: CPT | Performed by: INTERNAL MEDICINE

## 2019-02-06 PROCEDURE — 85027 COMPLETE CBC AUTOMATED: CPT | Performed by: INTERNAL MEDICINE

## 2019-02-06 PROCEDURE — 80048 BASIC METABOLIC PNL TOTAL CA: CPT | Performed by: INTERNAL MEDICINE

## 2019-02-06 NOTE — TELEPHONE ENCOUNTER
Patient Call: Transplant Lab/Orders  Route to LPN  Post Transplant Days: 1740  When patient is less than 60 days post-transplant, route high priority    Reason for Call: Annual lab reorder  Callback needed? No     [facility] needs lab order added to his chart.    Thanks!

## 2019-02-07 LAB
ANION GAP SERPL CALCULATED.3IONS-SCNC: 7 MMOL/L (ref 3–14)
BUN SERPL-MCNC: 30 MG/DL (ref 7–30)
CALCIUM SERPL-MCNC: 10 MG/DL (ref 8.5–10.1)
CHLORIDE SERPL-SCNC: 105 MMOL/L (ref 94–109)
CO2 SERPL-SCNC: 25 MMOL/L (ref 20–32)
CREAT SERPL-MCNC: 1.18 MG/DL (ref 0.66–1.25)
GFR SERPL CREATININE-BSD FRML MDRD: 61 ML/MIN/{1.73_M2}
GLUCOSE SERPL-MCNC: 77 MG/DL (ref 70–99)
POTASSIUM SERPL-SCNC: 4.9 MMOL/L (ref 3.4–5.3)
SODIUM SERPL-SCNC: 137 MMOL/L (ref 133–144)

## 2019-04-03 ENCOUNTER — TELEPHONE (OUTPATIENT)
Dept: TRANSPLANT | Facility: CLINIC | Age: 74
End: 2019-04-03

## 2019-04-03 DIAGNOSIS — Z94.0 KIDNEY REPLACED BY TRANSPLANT: ICD-10-CM

## 2019-04-03 DIAGNOSIS — Z79.899 ENCOUNTER FOR LONG-TERM CURRENT USE OF MEDICATION: ICD-10-CM

## 2019-04-03 DIAGNOSIS — Z48.298 AFTERCARE FOLLOWING ORGAN TRANSPLANT: ICD-10-CM

## 2019-04-03 LAB
ANION GAP SERPL CALCULATED.3IONS-SCNC: 6 MMOL/L (ref 3–14)
BUN SERPL-MCNC: 29 MG/DL (ref 7–30)
CALCIUM SERPL-MCNC: 9.7 MG/DL (ref 8.5–10.1)
CHLORIDE SERPL-SCNC: 107 MMOL/L (ref 94–109)
CO2 SERPL-SCNC: 27 MMOL/L (ref 20–32)
CREAT SERPL-MCNC: 1.22 MG/DL (ref 0.66–1.25)
ERYTHROCYTE [DISTWIDTH] IN BLOOD BY AUTOMATED COUNT: 13.1 % (ref 10–15)
GFR SERPL CREATININE-BSD FRML MDRD: 59 ML/MIN/{1.73_M2}
GLUCOSE SERPL-MCNC: 86 MG/DL (ref 70–99)
HCT VFR BLD AUTO: 41.7 % (ref 40–53)
HGB BLD-MCNC: 13.5 G/DL (ref 13.3–17.7)
MCH RBC QN AUTO: 29.8 PG (ref 26.5–33)
MCHC RBC AUTO-ENTMCNC: 32.4 G/DL (ref 31.5–36.5)
MCV RBC AUTO: 92 FL (ref 78–100)
PLATELET # BLD AUTO: 120 10E9/L (ref 150–450)
POTASSIUM SERPL-SCNC: 5.4 MMOL/L (ref 3.4–5.3)
RBC # BLD AUTO: 4.53 10E12/L (ref 4.4–5.9)
SODIUM SERPL-SCNC: 140 MMOL/L (ref 133–144)
TACROLIMUS BLD-MCNC: 4.3 UG/L (ref 5–15)
TME LAST DOSE: 2000 H
WBC # BLD AUTO: 2.8 10E9/L (ref 4–11)

## 2019-04-03 PROCEDURE — 80048 BASIC METABOLIC PNL TOTAL CA: CPT | Performed by: INTERNAL MEDICINE

## 2019-04-03 PROCEDURE — 85027 COMPLETE CBC AUTOMATED: CPT | Performed by: INTERNAL MEDICINE

## 2019-04-03 PROCEDURE — 36415 COLL VENOUS BLD VENIPUNCTURE: CPT | Performed by: INTERNAL MEDICINE

## 2019-04-03 PROCEDURE — 80197 ASSAY OF TACROLIMUS: CPT | Performed by: INTERNAL MEDICINE

## 2019-04-03 NOTE — TELEPHONE ENCOUNTER
ISSUE:  K+ 5.4    PLAN:  Call and encourage patient to monitor intake of high K+ foods.     OUTCOME:  Called and advised patient to monitor intake of K+ rich foods.  Vikram verbalized understanding.

## 2019-05-07 ENCOUNTER — OFFICE VISIT (OUTPATIENT)
Dept: NEPHROLOGY | Facility: CLINIC | Age: 74
End: 2019-05-07
Attending: INTERNAL MEDICINE
Payer: MEDICARE

## 2019-05-07 ENCOUNTER — MEDICAL CORRESPONDENCE (OUTPATIENT)
Dept: HEALTH INFORMATION MANAGEMENT | Facility: CLINIC | Age: 74
End: 2019-05-07

## 2019-05-07 VITALS
OXYGEN SATURATION: 99 % | WEIGHT: 143.4 LBS | SYSTOLIC BLOOD PRESSURE: 168 MMHG | DIASTOLIC BLOOD PRESSURE: 85 MMHG | HEART RATE: 91 BPM | BODY MASS INDEX: 21.8 KG/M2

## 2019-05-07 DIAGNOSIS — Z48.298 AFTERCARE FOLLOWING ORGAN TRANSPLANT: Primary | ICD-10-CM

## 2019-05-07 DIAGNOSIS — Z79.899 NEED FOR PROPHYLACTIC CHEMOTHERAPY: ICD-10-CM

## 2019-05-07 DIAGNOSIS — Z79.899 LONG TERM USE OF DRUG: ICD-10-CM

## 2019-05-07 DIAGNOSIS — Z94.0 KIDNEY REPLACED BY TRANSPLANT: Primary | ICD-10-CM

## 2019-05-07 RX ORDER — CARVEDILOL 3.12 MG/1
3.12 TABLET ORAL 2 TIMES DAILY WITH MEALS
Qty: 180 TABLET | Refills: 3 | Status: SHIPPED | OUTPATIENT
Start: 2019-05-07 | End: 2020-03-16

## 2019-05-07 NOTE — PATIENT INSTRUCTIONS
Start Coreg 1 tablet twice daily and keep logs. If BP is greater than 140 then increase dose to 2 tablets twice daily

## 2019-05-07 NOTE — PROGRESS NOTES
[facility]  Nephrology Clinic  Kidney/Pancreas Recipient  2019     Name: Vikrma Wetzel  MRN: [id number]   Age: 73 year old  : 1945  Referring provider: Adan Hill     CHRONIC TRANSPLANT NEPHROLOGY VISIT    Assessment and Plan:   # DDKT:    - Baseline Cr ~ 1.3 - 1.5; Stable   - Proteinuria: Normal   - Date of DSA last checked: 18 Latest DSA: No   - BK Viremia: No   - Kidney Tx Biopsy: No    # Immunosuppression: Tacrolimus immediate release and Mycophenolate mofetil    - Changes: No    # Prophylaxis:    - PJP on Bactrim.     # Hypertension: Borderline control; Goal BP: < 140/90, recent reported home blood pressures with systolic pressure above goal.   - Changes: Yes - will add low dose Carvedilol 3.125 mg twice daily    - Patient will continue to check blood pressure at home and report values via [employer]    # Anemia in chronic renal disease: Hgb: Stable   - Iron studies: Not checked recently    # Mineral Bone Disorder:    - Secondary renal hyperparathyroidism; PTH level is: Not checked recently   - Vitamin D; level is: Not checked recently   - Calcium; level is: Normal   - Phosphorus; level is: Not checked recently     # Electrolytes:   - Potassium; level: Not checked recently, but was high last check  - Magnesium; level: Not checked recently  - Bicarbonate; level: Normal    # Skin Cancer Risk:    - Discussed sun protection and recommend regular follow up with Dermatology.    # Medical Compliance: Yes    Follow-up: No follow-ups on file.     # Transplant History:  Etiology of kidney failure: unknown etiology  Tx: DDKT  Transplant Date 5/3/14  Donor Type:   Significant changes in immunosuppression: None  Significant transplant-related complications: None    Transplant Office Phone Number: [phone]    Assessment and plan was discussed with the patient and they voiced understanding and agreement.    Chief Complaint   Follow-up    History of Present Illness:  Vikram Wetzel is  "a 73 year old male with a history of ESKD from unclear etiology, status-post DDKT completed on 5/3/14, who presents for follow-up.    The patient was last evaluated on 5/7/18 by Dr. Prieto. At that time, he was doing fairly well.  No changes were made to his immunosuppression.  His blood pressure was noted to be borderline.  Please see note for further details.     Since the patient's last evaluation, he has made a concerted effort to lose weight by changing his diet.  He is walking 3 miles at a fast pace 5 days a week.  At home his blood pressure has been running 140s/60s and he is surprised that his pressure in clinic today is high.  He is not currently on any antihypertensives.  He notes more \"skipped beats\" recently.  He saw cardiology in the past for this and told he had PACs and PVCs.      Recent Hospitalizations:  No     New Medical Issues: No     Decreased energy: No     Chest pain or SOB with exertion:  No     Appetite change or weight change:  Yes  Deliberate with recent lifestyle changes   Nausea, vomiting or diarrhea:  No     Fever, sweats or chills: No     Leg swelling: No       Home BP: 140s/60s     Review of Systems:   A comprehensive review of systems was obtained and negative, except as noted in the HPI or past medical history.     Active Medications:      aspirin 81 MG tablet, Take 1 tablet by mouth daily., Disp: , Rfl:      CELLCEPT (BRAND) 250 MG capsule, Take 2 capsules (500 mg) by mouth 2 times daily, Disp: 120 capsule, Rfl: 11     PROGRAF (BRAND) 0.5 MG capsule, Take 1 capsule (0.5 mg) by mouth 2 times daily Total dose = 1.5 mg BID, Disp: 60 capsule, Rfl: 11     PROGRAF (BRAND) 1 MG capsule, Take 1 capsule (1 mg) by mouth 2 times daily Total dose = 1.5 mg BID, Disp: 60 capsule, Rfl: 11     sulfamethoxazole-trimethoprim (BACTRIM/SEPTRA) 400-80 MG per tablet, TAKE ONE TABLET BY MOUTH THREE TIMES A WEEK, Disp: 36 tablet, Rfl: 3     Allergies:   No known drug allergies.       Active Medical " Problems:  Immunosuppressed status  Kidney replaced by transplant  Leukopenia   Dyslipidemia   Paroxysmal atrial fibrillation   AV block   Hemorrhoids      Social History:   Presents to clinic alone.  Tobacco Use: No previous or current tobacco use.   Alcohol Use: No alcohol use.   PCP: Physician No Ref-Primary     Physical Exam:   /85   Pulse 91   Wt 65 kg (143 lb 6.4 oz)   SpO2 99%   BMI 21.80 kg/m     Wt Readings from Last 4 Encounters:   05/07/19 65 kg (143 lb 6.4 oz)   05/07/18 70 kg (154 lb 6.4 oz)   05/08/17 69.9 kg (154 lb)   05/09/16 71.9 kg (158 lb 9.6 oz)     GENERAL APPEARANCE: alert and no distress  HENT: mouth without ulcers or lesions  LYMPHATICS: no cervical or supraclavicular nodes  RESP: lungs clear to auscultation - no rales, rhonchi or wheezes  CV: regular rhythm, normal rate, no rub, no murmur  EDEMA: no LE edema bilaterally  ABDOMEN: soft, nondistended, nontender, bowel sounds normal  MS: extremities normal - no gross deformities noted, no evidence of inflammation in joints, no muscle tenderness  SKIN: no rash  DIALYSIS ACCESS:  LUE AV Fistula    Data:   Renal Latest Ref Rng & Units 4/3/2019 2/6/2019 12/5/2018   Na 133 - 144 mmol/L 140 137 137   K 3.4 - 5.3 mmol/L 5.4(H) 4.9 4.9   Cl 94 - 109 mmol/L 107 105 106   CO2 20 - 32 mmol/L 27 25 26   BUN 7 - 30 mg/dL 29 30 28   Cr 0.66 - 1.25 mg/dL 1.22 1.18 1.22   Glucose 70 - 99 mg/dL 86 77 78   Ca  8.5 - 10.1 mg/dL 9.7 10.0 9.8   Mg 1.6 - 2.3 mg/dL - - -     Bone Health Latest Ref Rng & Units 7/28/2014 7/24/2014 7/17/2014   Phos 2.5 - 4.5 mg/dL 2.1(L) 2.4(L) 2.3(L)     Heme Latest Ref Rng & Units 4/3/2019 2/6/2019 12/5/2018   WBC 4.0 - 11.0 10e9/L 2.8(L) 2.8(L) 2.7(L)   Hgb 13.3 - 17.7 g/dL 13.5 13.4 13.3   Plt 150 - 450 10e9/L 120(L) 129(L) 125(L)     Liver Latest Ref Rng & Units 10/29/2014 10/1/2014 5/3/2014   AP 40 - 150 U/L 132 122 92   TBili 0.2 - 1.3 mg/dL 0.6 0.4 0.3   DBili 0.0 - 0.2 mg/dL 0.1 0.1 -   ALT 0 - 70 U/L 24 29 25   AST  0 - 45 U/L 20 20 27   Tot Protein 6.8 - 8.8 g/dL 7.3 6.7(L) 7.1   Albumin 3.4 - 5.0 g/dL 3.8 3.4 3.9     Pancreas Latest Ref Rng & Units 5/5/2014 5/3/2014   A1C 4.3 - 6.0 % 5.6 5.2     Iron studies Latest Ref Rng & Units 7/1/2015 12/3/2014 5/6/2014   Iron 35 - 180 ug/dL 69 37 45   Iron sat 15 - 46 % 21 12(L) 20     [facility] Txp Virology Latest Ref Rng & Units 6/6/2018 4/11/2018 12/6/2017   CMV IgG EU/mL - - -   CVM DNA Quant - - EDTA PLASMA -   CMV Quant <100 Copies/mL - - -   CMV QT Log <2.0 Log copies/mL - - -   BK Spec - Plasma Plasma Plasma   BK Res BKNEG:BK Virus DNA Not Detected copies/mL BK Virus DNA Not Detected BK Virus DNA Not Detected BK Virus DNA Not Detected   BK Log <2.7 Log copies/mL Not Calculated Not Calculated Not Calculated   EBV IgG - - - -   Hep B Core NEG - - -   Hep B Surf - - - -   HIV 1&2 NEG - - -        Recent Labs   Lab Test 12/05/18  0803 02/06/19  0803 04/03/19  0754   DOSTAC 12.4.18 2000 2000 02/05/19 2,000   TACROL 3.9* 4.8* 4.3*     Recent Labs   Lab Test 02/03/16  0802 03/02/16  0756 04/06/16  0805   DOSMPA LAST DOSE 8PM ON 2/02/2016 last dose 8:00PM on 3/01/2016 2000 04/05/16   MPACID 1.48 1.78 2.32   MPAG 19.6* 22.3* 18.8*       Scribe Disclosure:  I, Yenni Avendano, am serving as a scribe to document services personally performed by Kidney/Pancreas Recipient at this visit, based upon the provider's statements to me. All documentation has been reviewed by the aforementioned provider prior to being entered into the official medical record.

## 2019-06-05 DIAGNOSIS — Z94.0 KIDNEY REPLACED BY TRANSPLANT: ICD-10-CM

## 2019-06-05 DIAGNOSIS — Z79.899 ENCOUNTER FOR LONG-TERM CURRENT USE OF MEDICATION: ICD-10-CM

## 2019-06-05 DIAGNOSIS — Z48.298 AFTERCARE FOLLOWING ORGAN TRANSPLANT: ICD-10-CM

## 2019-06-05 LAB
ANION GAP SERPL CALCULATED.3IONS-SCNC: 9 MMOL/L (ref 3–14)
BUN SERPL-MCNC: 20 MG/DL (ref 7–30)
CALCIUM SERPL-MCNC: 9.8 MG/DL (ref 8.5–10.1)
CHLORIDE SERPL-SCNC: 106 MMOL/L (ref 94–109)
CO2 SERPL-SCNC: 24 MMOL/L (ref 20–32)
CREAT SERPL-MCNC: 1.4 MG/DL (ref 0.66–1.25)
ERYTHROCYTE [DISTWIDTH] IN BLOOD BY AUTOMATED COUNT: 12.4 % (ref 10–15)
GFR SERPL CREATININE-BSD FRML MDRD: 50 ML/MIN/{1.73_M2}
GLUCOSE SERPL-MCNC: 86 MG/DL (ref 70–99)
HCT VFR BLD AUTO: 40 % (ref 40–53)
HGB BLD-MCNC: 13.1 G/DL (ref 13.3–17.7)
MCH RBC QN AUTO: 29.8 PG (ref 26.5–33)
MCHC RBC AUTO-ENTMCNC: 32.8 G/DL (ref 31.5–36.5)
MCV RBC AUTO: 91 FL (ref 78–100)
PLATELET # BLD AUTO: 121 10E9/L (ref 150–450)
POTASSIUM SERPL-SCNC: 4.7 MMOL/L (ref 3.4–5.3)
RBC # BLD AUTO: 4.4 10E12/L (ref 4.4–5.9)
SODIUM SERPL-SCNC: 139 MMOL/L (ref 133–144)
TACROLIMUS BLD-MCNC: 7.7 UG/L (ref 5–15)
TME LAST DOSE: 2000 H
WBC # BLD AUTO: 2.3 10E9/L (ref 4–11)

## 2019-06-05 PROCEDURE — 80197 ASSAY OF TACROLIMUS: CPT | Performed by: INTERNAL MEDICINE

## 2019-06-05 PROCEDURE — 85027 COMPLETE CBC AUTOMATED: CPT | Performed by: INTERNAL MEDICINE

## 2019-06-05 PROCEDURE — 36415 COLL VENOUS BLD VENIPUNCTURE: CPT | Performed by: INTERNAL MEDICINE

## 2019-06-05 PROCEDURE — 80048 BASIC METABOLIC PNL TOTAL CA: CPT | Performed by: INTERNAL MEDICINE

## 2019-06-06 ENCOUNTER — TELEPHONE (OUTPATIENT)
Dept: TRANSPLANT | Facility: CLINIC | Age: 74
End: 2019-06-06

## 2019-06-06 DIAGNOSIS — Z94.0 KIDNEY TRANSPLANTED: Primary | ICD-10-CM

## 2019-06-06 NOTE — TELEPHONE ENCOUNTER
ISSUE:   Tacrolimus level 7.7 on 6/5, goal 4-6, dose 1.5 mg BID    PLAN:   Please call pt and confirm this was a good 12-hour trough. Verify dose 1.5 mg BID. Confirm no new medications or illness (marcy. Diarrhea).   Previous levels have been within goal.  Recommend repeating level in 1-2 weeks.  If still elevated at that time will decrease dose.    OUTCOME:   Called Vikram to discuss elevated tac level.  No answer, left v/m requesting a return call.

## 2019-06-07 NOTE — TELEPHONE ENCOUNTER
Attempted to call Vikram again to discuss elevated tac.  No answer, left a detailed v/m requesting that he repeat tac level in 1-2 weeks with an accurate 12 hour trough.      Lab orders updated.

## 2019-06-18 DIAGNOSIS — Z94.0 KIDNEY TRANSPLANTED: ICD-10-CM

## 2019-06-18 LAB
TACROLIMUS BLD-MCNC: 7 UG/L (ref 5–15)
TME LAST DOSE: NORMAL H

## 2019-06-18 PROCEDURE — 36415 COLL VENOUS BLD VENIPUNCTURE: CPT | Performed by: INTERNAL MEDICINE

## 2019-06-18 PROCEDURE — 80197 ASSAY OF TACROLIMUS: CPT | Performed by: INTERNAL MEDICINE

## 2019-06-19 ENCOUNTER — TELEPHONE (OUTPATIENT)
Dept: TRANSPLANT | Facility: CLINIC | Age: 74
End: 2019-06-19

## 2019-06-19 DIAGNOSIS — Z94.0 KIDNEY TRANSPLANTED: ICD-10-CM

## 2019-06-19 RX ORDER — TACROLIMUS 1 MG/1
1 CAPSULE, GELATIN COATED ORAL 2 TIMES DAILY
Qty: 60 CAPSULE | Refills: 11 | Status: SHIPPED | OUTPATIENT
Start: 2019-06-19 | End: 2020-07-13

## 2019-06-19 RX ORDER — TACROLIMUS 0.5 MG/1
CAPSULE, GELATIN COATED ORAL
Qty: 60 CAPSULE | Refills: 11 | Status: SHIPPED | OUTPATIENT
Start: 2019-06-19 | End: 2022-01-26

## 2019-06-19 NOTE — TELEPHONE ENCOUNTER
ISSUE:  Tac 7, goal 4-6    PLAN:  Call and confirm a 12 hour trough and current tac dose of 1.5 mg BID  Any recent illness, diarrhea, or medication changes?  Previous level also elevated, recommend decreasing tac dose to 1 mg BID and repeat level in 1 week    OUTCOME:  Spoke with Vikram.  He confirms current dose and an accurate trough.   He denies any illness or diarrhea.  States he did recently start carvedilol but no other new medications.   Vikram verbalized understanding to decrease tac dose to 1 mg BID and repeat level in 1 week.   Rx updated, lab order updated.

## 2019-06-27 DIAGNOSIS — Z94.0 KIDNEY TRANSPLANTED: ICD-10-CM

## 2019-06-27 LAB
TACROLIMUS BLD-MCNC: 4.8 UG/L (ref 5–15)
TME LAST DOSE: 2000 H

## 2019-06-27 PROCEDURE — 36415 COLL VENOUS BLD VENIPUNCTURE: CPT | Performed by: INTERNAL MEDICINE

## 2019-06-27 PROCEDURE — 80197 ASSAY OF TACROLIMUS: CPT | Performed by: INTERNAL MEDICINE

## 2019-07-31 ENCOUNTER — ALLIED HEALTH/NURSE VISIT (OUTPATIENT)
Dept: FAMILY MEDICINE | Facility: CLINIC | Age: 74
End: 2019-07-31
Payer: MEDICARE

## 2019-07-31 VITALS — DIASTOLIC BLOOD PRESSURE: 68 MMHG | SYSTOLIC BLOOD PRESSURE: 120 MMHG

## 2019-07-31 DIAGNOSIS — Z01.30 BLOOD PRESSURE CHECK: Primary | ICD-10-CM

## 2019-07-31 PROCEDURE — 99207 ZZC NO CHARGE NURSE ONLY: CPT

## 2019-07-31 NOTE — PROGRESS NOTES
Vikram SANDER Rashard was evaluated at [facility] on July 31, 2019 at which time his blood pressure was:    BP Readings from Last 3 Encounters:   07/31/19 120/68   05/07/19 168/85   05/07/18 143/76     Pulse Readings from Last 3 Encounters:   05/07/19 91   05/07/18 70   05/08/17 65       Reviewed lifestyle modifications for blood pressure control and reduction: including making healthy food choices, managing weight, getting regular exercise, smoking cessation, reducing alcohol consumption, monitoring blood pressure regularly.     Symptoms: None    BP Goal:< 140/90 mmHg    BP Assessment:  BP at goal    Potential Reasons for BP too high: NA - Not applicable    BP Follow-Up Plan: Recheck BP in 6 months at pharmacy    Recommendation to Provider: recheck in 6 months    Note completed by: Siena Mcneil, PharmD  [facility]

## 2019-08-07 ENCOUNTER — RESULTS ONLY (OUTPATIENT)
Dept: OTHER | Facility: CLINIC | Age: 74
End: 2019-08-07

## 2019-08-07 DIAGNOSIS — Z94.0 KIDNEY REPLACED BY TRANSPLANT: ICD-10-CM

## 2019-08-07 DIAGNOSIS — Z79.899 LONG TERM USE OF DRUG: ICD-10-CM

## 2019-08-07 DIAGNOSIS — Z79.899 ENCOUNTER FOR LONG-TERM CURRENT USE OF MEDICATION: ICD-10-CM

## 2019-08-07 DIAGNOSIS — Z48.298 AFTERCARE FOLLOWING ORGAN TRANSPLANT: ICD-10-CM

## 2019-08-07 LAB
ANION GAP SERPL CALCULATED.3IONS-SCNC: 4 MMOL/L (ref 3–14)
BUN SERPL-MCNC: 18 MG/DL (ref 7–30)
CALCIUM SERPL-MCNC: 9.5 MG/DL (ref 8.5–10.1)
CHLORIDE SERPL-SCNC: 105 MMOL/L (ref 94–109)
CO2 SERPL-SCNC: 27 MMOL/L (ref 20–32)
CREAT SERPL-MCNC: 1.14 MG/DL (ref 0.66–1.25)
CREAT UR-MCNC: 60 MG/DL
ERYTHROCYTE [DISTWIDTH] IN BLOOD BY AUTOMATED COUNT: 12.7 % (ref 10–15)
GFR SERPL CREATININE-BSD FRML MDRD: 64 ML/MIN/{1.73_M2}
GLUCOSE SERPL-MCNC: 97 MG/DL (ref 70–99)
HCT VFR BLD AUTO: 38 % (ref 40–53)
HGB BLD-MCNC: 12.2 G/DL (ref 13.3–17.7)
MCH RBC QN AUTO: 29.1 PG (ref 26.5–33)
MCHC RBC AUTO-ENTMCNC: 32.1 G/DL (ref 31.5–36.5)
MCV RBC AUTO: 91 FL (ref 78–100)
PLATELET # BLD AUTO: 124 10E9/L (ref 150–450)
POTASSIUM SERPL-SCNC: 4.2 MMOL/L (ref 3.4–5.3)
PROT UR-MCNC: <0.05 G/L
PROT/CREAT 24H UR: NORMAL G/G CR (ref 0–0.2)
RBC # BLD AUTO: 4.19 10E12/L (ref 4.4–5.9)
SODIUM SERPL-SCNC: 136 MMOL/L (ref 133–144)
TACROLIMUS BLD-MCNC: 4.8 UG/L (ref 5–15)
TME LAST DOSE: ABNORMAL H
WBC # BLD AUTO: 2.6 10E9/L (ref 4–11)

## 2019-08-07 PROCEDURE — 84156 ASSAY OF PROTEIN URINE: CPT | Performed by: INTERNAL MEDICINE

## 2019-08-07 PROCEDURE — 86833 HLA CLASS II HIGH DEFIN QUAL: CPT | Performed by: INTERNAL MEDICINE

## 2019-08-07 PROCEDURE — 87799 DETECT AGENT NOS DNA QUANT: CPT | Performed by: INTERNAL MEDICINE

## 2019-08-07 PROCEDURE — 80197 ASSAY OF TACROLIMUS: CPT | Performed by: INTERNAL MEDICINE

## 2019-08-07 PROCEDURE — 85027 COMPLETE CBC AUTOMATED: CPT | Performed by: INTERNAL MEDICINE

## 2019-08-07 PROCEDURE — 80048 BASIC METABOLIC PNL TOTAL CA: CPT | Performed by: INTERNAL MEDICINE

## 2019-08-07 PROCEDURE — 36415 COLL VENOUS BLD VENIPUNCTURE: CPT | Performed by: INTERNAL MEDICINE

## 2019-08-07 PROCEDURE — 86832 HLA CLASS I HIGH DEFIN QUAL: CPT | Performed by: INTERNAL MEDICINE

## 2019-08-09 LAB
BKV DNA # SPEC NAA+PROBE: NORMAL COPIES/ML
BKV DNA SPEC NAA+PROBE-LOG#: NORMAL LOG COPIES/ML
DONOR IDENTIFICATION: NORMAL
DSA COMMENTS: NORMAL
DSA PRESENT: NO
DSA TEST METHOD: NORMAL
ORGAN: NORMAL
SA1 CELL: NORMAL
SA1 COMMENTS: NORMAL
SA1 HI RISK ABY: NORMAL
SA1 MOD RISK ABY: NORMAL
SA1 TEST METHOD: NORMAL
SA2 CELL: NORMAL
SA2 COMMENTS: NORMAL
SA2 HI RISK ABY UA: NORMAL
SA2 MOD RISK ABY: NORMAL
SA2 TEST METHOD: NORMAL
SPECIMEN SOURCE: NORMAL
UNACCEPTABLE ANTIGEN: NORMAL
UNOS CPRA: 3

## 2019-11-04 ENCOUNTER — HEALTH MAINTENANCE LETTER (OUTPATIENT)
Age: 74
End: 2019-11-04

## 2019-11-06 DIAGNOSIS — Z79.899 ENCOUNTER FOR LONG-TERM CURRENT USE OF MEDICATION: ICD-10-CM

## 2019-11-06 DIAGNOSIS — Z94.0 KIDNEY REPLACED BY TRANSPLANT: ICD-10-CM

## 2019-11-06 DIAGNOSIS — Z79.899 LONG TERM USE OF DRUG: ICD-10-CM

## 2019-11-06 DIAGNOSIS — Z48.298 AFTERCARE FOLLOWING ORGAN TRANSPLANT: ICD-10-CM

## 2019-11-06 LAB
ANION GAP SERPL CALCULATED.3IONS-SCNC: 5 MMOL/L (ref 3–14)
BUN SERPL-MCNC: 21 MG/DL (ref 7–30)
CALCIUM SERPL-MCNC: 9.3 MG/DL (ref 8.5–10.1)
CHLORIDE SERPL-SCNC: 104 MMOL/L (ref 94–109)
CO2 SERPL-SCNC: 27 MMOL/L (ref 20–32)
CREAT SERPL-MCNC: 1.22 MG/DL (ref 0.66–1.25)
CREAT UR-MCNC: 60 MG/DL
ERYTHROCYTE [DISTWIDTH] IN BLOOD BY AUTOMATED COUNT: 13 % (ref 10–15)
GFR SERPL CREATININE-BSD FRML MDRD: 59 ML/MIN/{1.73_M2}
GLUCOSE SERPL-MCNC: 88 MG/DL (ref 70–99)
HCT VFR BLD AUTO: 42.3 % (ref 40–53)
HGB BLD-MCNC: 13.3 G/DL (ref 13.3–17.7)
MCH RBC QN AUTO: 28.7 PG (ref 26.5–33)
MCHC RBC AUTO-ENTMCNC: 31.4 G/DL (ref 31.5–36.5)
MCV RBC AUTO: 91 FL (ref 78–100)
PLATELET # BLD AUTO: 138 10E9/L (ref 150–450)
POTASSIUM SERPL-SCNC: 4.7 MMOL/L (ref 3.4–5.3)
PROT UR-MCNC: <0.05 G/L
PROT/CREAT 24H UR: NORMAL G/G CR (ref 0–0.2)
RBC # BLD AUTO: 4.64 10E12/L (ref 4.4–5.9)
SODIUM SERPL-SCNC: 136 MMOL/L (ref 133–144)
TACROLIMUS BLD-MCNC: 4.7 UG/L (ref 5–15)
TME LAST DOSE: 2000 H
WBC # BLD AUTO: 2.7 10E9/L (ref 4–11)

## 2019-11-06 PROCEDURE — 80048 BASIC METABOLIC PNL TOTAL CA: CPT | Performed by: INTERNAL MEDICINE

## 2019-11-06 PROCEDURE — 85027 COMPLETE CBC AUTOMATED: CPT | Performed by: INTERNAL MEDICINE

## 2019-11-06 PROCEDURE — 80197 ASSAY OF TACROLIMUS: CPT | Performed by: INTERNAL MEDICINE

## 2019-11-06 PROCEDURE — 36415 COLL VENOUS BLD VENIPUNCTURE: CPT | Performed by: INTERNAL MEDICINE

## 2019-11-06 PROCEDURE — 84156 ASSAY OF PROTEIN URINE: CPT | Performed by: INTERNAL MEDICINE

## 2019-11-15 DIAGNOSIS — Z94.0 STATUS POST KIDNEY TRANSPLANT: Primary | ICD-10-CM

## 2019-11-15 RX ORDER — SULFAMETHOXAZOLE AND TRIMETHOPRIM 400; 80 MG/1; MG/1
1 TABLET ORAL
Qty: 39 TABLET | Refills: 3 | Status: SHIPPED | OUTPATIENT
Start: 2019-11-16 | End: 2020-08-31

## 2019-11-25 ENCOUNTER — OFFICE VISIT (OUTPATIENT)
Dept: URGENT CARE | Facility: URGENT CARE | Age: 74
End: 2019-11-25
Payer: MEDICARE

## 2019-11-25 VITALS
OXYGEN SATURATION: 100 % | SYSTOLIC BLOOD PRESSURE: 144 MMHG | TEMPERATURE: 98.7 F | DIASTOLIC BLOOD PRESSURE: 82 MMHG | HEART RATE: 66 BPM

## 2019-11-25 DIAGNOSIS — R10.31 ABDOMINAL PAIN, RIGHT LOWER QUADRANT: Primary | ICD-10-CM

## 2019-11-25 LAB
ALBUMIN UR-MCNC: NEGATIVE MG/DL
ANION GAP SERPL CALCULATED.3IONS-SCNC: 4 MMOL/L (ref 3–14)
APPEARANCE UR: CLEAR
BILIRUB UR QL STRIP: NEGATIVE
BUN SERPL-MCNC: 18 MG/DL (ref 7–30)
CALCIUM SERPL-MCNC: 9.6 MG/DL (ref 8.5–10.1)
CHLORIDE SERPL-SCNC: 104 MMOL/L (ref 94–109)
CO2 SERPL-SCNC: 27 MMOL/L (ref 20–32)
COLOR UR AUTO: YELLOW
CREAT SERPL-MCNC: 1.15 MG/DL (ref 0.66–1.25)
GFR SERPL CREATININE-BSD FRML MDRD: 63 ML/MIN/{1.73_M2}
GLUCOSE SERPL-MCNC: 100 MG/DL (ref 70–99)
GLUCOSE UR STRIP-MCNC: NEGATIVE MG/DL
HGB UR QL STRIP: NEGATIVE
KETONES UR STRIP-MCNC: NEGATIVE MG/DL
LEUKOCYTE ESTERASE UR QL STRIP: NEGATIVE
NITRATE UR QL: NEGATIVE
PH UR STRIP: 6 PH (ref 5–7)
POTASSIUM SERPL-SCNC: 5.1 MMOL/L (ref 3.4–5.3)
SODIUM SERPL-SCNC: 135 MMOL/L (ref 133–144)
SOURCE: NORMAL
SP GR UR STRIP: 1.01 (ref 1–1.03)
UROBILINOGEN UR STRIP-ACNC: 0.2 EU/DL (ref 0.2–1)

## 2019-11-25 PROCEDURE — 82570 ASSAY OF URINE CREATININE: CPT | Performed by: FAMILY MEDICINE

## 2019-11-25 PROCEDURE — 80048 BASIC METABOLIC PNL TOTAL CA: CPT | Performed by: FAMILY MEDICINE

## 2019-11-25 PROCEDURE — 36415 COLL VENOUS BLD VENIPUNCTURE: CPT | Performed by: FAMILY MEDICINE

## 2019-11-25 PROCEDURE — 81003 URINALYSIS AUTO W/O SCOPE: CPT | Performed by: FAMILY MEDICINE

## 2019-11-25 PROCEDURE — 99203 OFFICE O/P NEW LOW 30 MIN: CPT | Performed by: FAMILY MEDICINE

## 2019-11-26 LAB — CREAT UR-MCNC: 74 MG/DL

## 2019-11-26 NOTE — PROGRESS NOTES
"SUBJECTIVE:   Vikram Wetzel is a 74 year old male presenting with a chief complaint of   Chief Complaint   Patient presents with     Urgent Care     Abdominal Pain     Right area of lower abdomen pain-radiates into \"side area\"-no known injury-started this AM     He awoke this morning was in the kitchen and he bumped himself on the counter and noticed some abdominal pain in the area of the scar for his renal transplant.  He is concerned that he may be having some rejection because of pain    .  He has no fever no chills no malaise    He has had no rejection in the past    He is on immunosuppressive medication for ejection.  Transplant [facility]    He is in between  primary care doctors.  He is an established patient of [facility].        Review of Systems    Past Medical History:   Diagnosis Date     Anemia in chronic kidney disease(285.21)      Arrhythmia     pac, pvc     AV block     hi grade 2nd degree AVB at night ?sleep apnea w/u in progress-waqar     Dyslipidemia      End stage kidney disease (H)      High risk medication use      Hypertension      Immunosuppressed status (H)      Kidney replaced by transplant 5/3/14     Paroxysmal atrial fibrillation (H)      Family History   Problem Relation Age of Onset     Dementia Mother         alcohol onset     Dementia Father         alcohol onset     Cancer Brother         Lymphoma/lymph nodes     Cancer Sister      Other - See Comments Brother         colostomy bag     Current Outpatient Medications   Medication Sig Dispense Refill     aspirin 81 MG tablet Take 1 tablet by mouth daily.       carvedilol (COREG) 3.125 MG tablet Take 1 tablet (3.125 mg) by mouth 2 times daily (with meals) 180 tablet 3     CELLCEPT (BRAND) 250 MG capsule Take 2 capsules (500 mg) by mouth 2 times daily 120 capsule 11     PROGRAF (BRAND) 0.5 MG capsule HOLD 60 capsule 11     PROGRAF (BRAND) 1 MG capsule Take 1 capsule (1 mg) by mouth 2 times daily 60 capsule 11     " sulfamethoxazole-trimethoprim (BACTRIM/SEPTRA) 400-80 MG tablet Take 1 tablet by mouth three times a week (Mondays, Wednesdays, Fridays) 39 tablet 3     Social History     Tobacco Use     Smoking status: Never Smoker     Smokeless tobacco: Never Used   Substance Use Topics     Alcohol use: No     Alcohol/week: 0.0 standard drinks       OBJECTIVE  BP (!) 144/82 (BP Location: Right arm, Patient Position: Sitting, Cuff Size: Adult Regular)   Pulse 66   Temp 98.7  F (37.1  C) (Oral)   SpO2 100%     Physical Exam    Labs:  Results for orders placed or performed in visit on 11/25/19 (from the past 24 hour(s))   *UA reflex to Microscopic and Culture ([place] [facility] (except [place] and [place])   Result Value Ref Range    Color Urine Yellow     Appearance Urine Clear     Glucose Urine Negative NEG^Negative mg/dL    Bilirubin Urine Negative NEG^Negative    Ketones Urine Negative NEG^Negative mg/dL    Specific Gravity Urine 1.015 1.003 - 1.035    Blood Urine Negative NEG^Negative    pH Urine 6.0 5.0 - 7.0 pH    Protein Albumin Urine Negative NEG^Negative mg/dL    Urobilinogen Urine 0.2 0.2 - 1.0 EU/dL    Nitrite Urine Negative NEG^Negative    Leukocyte Esterase Urine Negative NEG^Negative    Source Midstream Urine    Basic metabolic panel   Result Value Ref Range    Sodium 135 133 - 144 mmol/L    Potassium 5.1 3.4 - 5.3 mmol/L    Chloride 104 94 - 109 mmol/L    Carbon Dioxide 27 20 - 32 mmol/L    Anion Gap 4 3 - 14 mmol/L    Glucose 100 (H) 70 - 99 mg/dL    Urea Nitrogen 18 7 - 30 mg/dL    Creatinine 1.15 0.66 - 1.25 mg/dL    GFR Estimate 63 >60 mL/min/[1.73_m2]    GFR Estimate If Black 73 >60 mL/min/[1.73_m2]    Calcium 9.6 8.5 - 10.1 mg/dL       X-Ray was not done.    ASSESSMENT:      ICD-10-CM    1. Abdominal pain, right lower quadrant R10.31 *UA reflex to Microscopic and Culture ([place] [facility] (except [place] and [place])     Basic metabolic panel     Creatinine random urine      CANCELED: [facility] Kidney Transplant Monitoring   I do not suspect that he is having  Any rejection he certainly could have a bruise in the area could be a fat infarct but symptoms are not significant for rejection of his kidney.    We also did check a urine there is no proteinuria    The urine creatinine is being run  A serum creatinine is normal    Medical Decision Making:    Differential Diagnosis:  Abdominal painAs above    Serious Comorbid Conditions:  Adult:  Immunosuppression from renal transplant    PLAN:    1.  He is set to be seen by his primary care if he is.    Serum creatinine is elevated we will contact his primary care will probably end up contacting the [facility].    Followup:    He is encouraged to call to the [facility] in regards to his labs and his abdominal pain also

## 2019-12-16 DIAGNOSIS — Z94.0 KIDNEY TRANSPLANTED: Primary | ICD-10-CM

## 2019-12-16 DIAGNOSIS — D84.9 IMMUNOSUPPRESSED STATUS (H): ICD-10-CM

## 2019-12-16 RX ORDER — MYCOPHENOLATE MOFETIL 250 MG/1
500 CAPSULE ORAL 2 TIMES DAILY
Qty: 120 CAPSULE | Refills: 11 | Status: SHIPPED | OUTPATIENT
Start: 2019-12-16 | End: 2020-12-11

## 2020-01-10 ASSESSMENT — ENCOUNTER SYMPTOMS
ABDOMINAL PAIN: 0
FREQUENCY: 0
HEARTBURN: 0
EYE PAIN: 0
DYSURIA: 0
MYALGIAS: 0
ARTHRALGIAS: 0
JOINT SWELLING: 0
SHORTNESS OF BREATH: 0
HEMATURIA: 0
DIZZINESS: 0
PARESTHESIAS: 0
COUGH: 0
HEMATOCHEZIA: 0
FEVER: 0
HEADACHES: 0
CONSTIPATION: 0
SORE THROAT: 0
NERVOUS/ANXIOUS: 0
WEAKNESS: 0
DIARRHEA: 0
PALPITATIONS: 0
NAUSEA: 0
CHILLS: 0

## 2020-01-10 ASSESSMENT — ACTIVITIES OF DAILY LIVING (ADL): CURRENT_FUNCTION: NO ASSISTANCE NEEDED

## 2020-01-13 ENCOUNTER — OFFICE VISIT (OUTPATIENT)
Dept: FAMILY MEDICINE | Facility: CLINIC | Age: 75
End: 2020-01-13
Payer: MEDICARE

## 2020-01-13 VITALS
TEMPERATURE: 97.5 F | HEART RATE: 68 BPM | WEIGHT: 140 LBS | DIASTOLIC BLOOD PRESSURE: 68 MMHG | BODY MASS INDEX: 21.97 KG/M2 | SYSTOLIC BLOOD PRESSURE: 118 MMHG | HEIGHT: 67 IN | OXYGEN SATURATION: 100 %

## 2020-01-13 DIAGNOSIS — Z00.00 ENCOUNTER FOR MEDICARE ANNUAL WELLNESS EXAM: Primary | ICD-10-CM

## 2020-01-13 DIAGNOSIS — M75.101 ROTATOR CUFF SYNDROME, RIGHT: ICD-10-CM

## 2020-01-13 DIAGNOSIS — Z12.11 SPECIAL SCREENING FOR MALIGNANT NEOPLASMS, COLON: ICD-10-CM

## 2020-01-13 PROCEDURE — 99213 OFFICE O/P EST LOW 20 MIN: CPT | Mod: 25 | Performed by: FAMILY MEDICINE

## 2020-01-13 PROCEDURE — G0439 PPPS, SUBSEQ VISIT: HCPCS | Performed by: FAMILY MEDICINE

## 2020-01-13 ASSESSMENT — ENCOUNTER SYMPTOMS
CHILLS: 0
FREQUENCY: 0
CONSTIPATION: 0
NERVOUS/ANXIOUS: 0
DIARRHEA: 0
HEADACHES: 0
SHORTNESS OF BREATH: 0
COUGH: 0
ABDOMINAL PAIN: 0
SORE THROAT: 0
HEMATURIA: 0
DYSURIA: 0
HEMATOCHEZIA: 0
NAUSEA: 0
HEARTBURN: 0
EYE PAIN: 0
PARESTHESIAS: 0
WEAKNESS: 0
PALPITATIONS: 0
DIZZINESS: 0
FEVER: 0

## 2020-01-13 ASSESSMENT — ACTIVITIES OF DAILY LIVING (ADL): CURRENT_FUNCTION: NO ASSISTANCE NEEDED

## 2020-01-13 ASSESSMENT — MIFFLIN-ST. JEOR: SCORE: 1343.67

## 2020-01-13 NOTE — PROGRESS NOTES
"     Answers for HPI/ROS submitted by the patient on 1/10/2020   Annual Exam:  In general, how would you rate your overall physical health?: excellent  Frequency of exercise:: 4-5 days/week  Do you usually eat at least 4 servings of fruit and vegetables a day, include whole grains & fiber, and avoid regularly eating high fat or \"junk\" foods? : Yes  Taking medications regularly:: Yes  Medication side effects:: None  Activities of Daily Living: no assistance needed  Home safety: no safety concerns identified  Hearing Impairment:: difficulty following a conversation in a noisy restaurant or crowded room, difficulty understanding soft or whispered speech  In the past 6 months, have you been bothered by leaking of urine?: No  abdominal pain: No  Blood in stool: No  Blood in urine: No  chest pain: No  chills: No  congestion: No  constipation: No  cough: No  diarrhea: No  dizziness: No  ear pain: No  eye pain: No  nervous/anxious: No  fever: No  frequency: No  genital sores: No  headaches: No  hearing loss: No  heartburn: No  arthralgias: No  joint swelling: No  peripheral edema: No  mood changes: No  myalgias: No  nausea: No  dysuria: No  palpitations: No  Skin sensation changes: No  sore throat: No  urgency: No  rash: No  shortness of breath: No  visual disturbance: No  weakness: No  impotence: No  penile discharge: No  In general, how would you rate your overall mental or emotional health?: excellent  Duration of exercise:: Other    Patient complains of intermittent discomfort below his right shoulder.  He has noticed that holding hands with his spouse generates discomfort otherwise it is intermittent.  He is tried no therapies.  Recalls no trauma  ROS no weakness no cervical symptoms no rash  /68 (BP Location: Right arm, Patient Position: Sitting, Cuff Size: Adult Regular)   Pulse 68   Temp 97.5  F (36.4  C) (Oral)   Ht 1.702 m (5' 7\")   Wt 63.5 kg (140 lb)   SpO2 100%   BMI 21.93 kg/m    Full " abduction    ASSESSMENT / PLAN:    (M75.101) Rotator cuff syndrome, right  Comment: Most likely  Plan: Discussed pathophysiology natural history and available treatment options he will consider physical therapy          Wilder Lion MD

## 2020-01-13 NOTE — PROGRESS NOTES
"SUBJECTIVE:   Vikram Wetzel is a 74 year old male who presents for Preventive Visit.      Are you in the first 12 months of your Medicare coverage?  No    Healthy Habits:     In general, how would you rate your overall health?  Excellent    Frequency of exercise:  4-5 days/week    Duration of exercise:  Other    Do you usually eat at least 4 servings of fruit and vegetables a day, include whole grains    & fiber and avoid regularly eating high fat or \"junk\" foods?  Yes    Taking medications regularly:  Yes    Medication side effects:  None    Ability to successfully perform activities of daily living:  No assistance needed    Home Safety:  No safety concerns identified    Hearing Impairment:  Difficulty following a conversation in a noisy restaurant or crowded room and difficulty understanding soft or whispered speech    In the past 6 months, have you been bothered by leaking of urine?  No    In general, how would you rate your overall mental or emotional health?  Excellent      PHQ-2 Total Score: 0    Do you feel safe in your environment? Yes    Have you ever done Advance Care Planning? (For example, a Health Directive, POLST, or a discussion with a medical provider or your loved ones about your wishes): Yes, patient states has an Advance Care Planning document and will bring a copy to the clinic.      Fall risk  Fallen 2 or more times in the past year?: No  Any fall with injury in the past year?: No    Cognitive Screening   1) Repeat 3 items (Leader, Season, Table)    2) Clock draw: NORMAL  3) 3 item recall: Recalls 3 objects  Results: 3 items recalled: COGNITIVE IMPAIRMENT LESS LIKELY    Mini-CogTM Copyright SANKET Yuan. Licensed by the author for use in [facility] [place]; reprinted with permission (jose alfredo@.[facility]). All rights reserved.          Reviewed and updated as needed this visit by clinical staff  Tobacco  Allergies  Meds  Problems  Med Hx  Surg Hx  Fam Hx  Soc Hx          Reviewed and updated as " needed this visit by Provider  Tobacco  Allergies  Meds  Problems  Med Hx  Surg Hx  Fam Hx        Social History     Tobacco Use     Smoking status: Never Smoker     Smokeless tobacco: Never Used   Substance Use Topics     Alcohol use: No     Alcohol/week: 0.0 standard drinks         Alcohol Use 1/10/2020   Prescreen: >3 drinks/day or >7 drinks/week? Not Applicable   Prescreen: >3 drinks/day or >7 drinks/week? -         Past Medical History:   Diagnosis Date     Anemia in chronic kidney disease(285.21)      Arrhythmia     pac, pvc     AV block     hi grade 2nd degree AVB at night ?sleep apnea w/u in progress-waqar     Dyslipidemia      End stage kidney disease (H)      High risk medication use      Hypertension      Immunosuppressed status (H)      Kidney replaced by transplant 5/3/14     Paroxysmal atrial fibrillation (H)        Past Surgical History:   Procedure Laterality Date     AV FISTULA OR GRAFT ARTERIAL       CREATE FISTULA ARTERIOVENOUS LOWER EXTREMITY  2011    Procedure:CREATE FISTULA ARTERIOVENOUS UPPER EXTREMITY; LEFT ARTERIOVENOUS FISTULA WITH WRIST VEIN; Surgeon:SYMONE ROSS; Location: OR     CYSTOSCOPY, REMOVE STENT(S) CHILD, COMBINED  6/3/2014    Procedure: COMBINED CYSTOSCOPY, REMOVE STENT(S);  Surgeon: Aneesh Perera MD;  Location: [facility] OR     TONSILLECTOMY       TRANSPLANT KIDNEY RECIPIENT  DONOR  5/3/2014    Procedure: TRANSPLANT KIDNEY RECIPIENT  DONOR;  Surgeon: Aneesh Perera MD;  Location: [facility] OR       Family History   Problem Relation Age of Onset     Dementia Mother         alcohol onset     Dementia Father         alcohol onset     Cancer Brother         Lymphoma/lymph nodes     Cancer Sister      Other - See Comments Brother         colostomy bag       Social History     Tobacco Use     Smoking status: Never Smoker     Smokeless tobacco: Never Used   Substance Use Topics     Alcohol use: No     Alcohol/week: 0.0 standard drinks  "          Current providers sharing in care for this patient include: Nephrology  Patient Care Team:  No Ref-Primary, Physician as PCP - General  Adan Hill MD as MD (Nephrology)  Sarkis Prieto MD as MD (Nephrology)    The following health maintenance items are reviewed in Epic and correct as of today:  Health Maintenance   Topic Date Due     ANNUAL REVIEW OF HM ORDERS  1945     FALL RISK ASSESSMENT  05/18/2012     MEDICARE ANNUAL WELLNESS VISIT  05/30/2013     COLONOSCOPY  05/30/2017     LIPID  10/29/2019     ADVANCE CARE PLANNING  01/13/2025     DTAP/TDAP/TD IMMUNIZATION (2 - Td) 10/01/2025     HEPATITIS C SCREENING  Completed     PHQ-2  Completed     INFLUENZA VACCINE  Completed     PNEUMOCOCCAL IMMUNIZATION 65+ HIGH/HIGHEST RISK  Completed     ZOSTER IMMUNIZATION  Completed     IPV IMMUNIZATION  Aged Out     MENINGITIS IMMUNIZATION  Aged Out     AORTIC ANEURYSM SCREENING (SYSTEM ASSIGNED)  Discontinued           Review of Systems   Constitutional: Negative for chills and fever.   HENT: Negative for congestion, ear pain, hearing loss and sore throat.    Eyes: Negative for pain and visual disturbance.   Respiratory: Negative for cough and shortness of breath.    Cardiovascular: Negative for chest pain, palpitations and peripheral edema.   Gastrointestinal: Negative for abdominal pain, constipation, diarrhea, heartburn, hematochezia and nausea.   Genitourinary: Negative for discharge, dysuria, frequency, genital sores, hematuria, impotence and urgency.   Musculoskeletal:        See additional note   Skin: Negative for rash.   Neurological: Negative for dizziness, weakness, headaches and paresthesias.   Psychiatric/Behavioral: Negative for mood changes. The patient is not nervous/anxious.          OBJECTIVE:   /68 (BP Location: Right arm, Patient Position: Sitting, Cuff Size: Adult Regular)   Pulse 68   Temp 97.5  F (36.4  C) (Oral)   Ht 1.702 m (5' 7\")   Wt 63.5 kg (140 " "lb)   SpO2 100%   BMI 21.93 kg/m   Estimated body mass index is 21.93 kg/m  as calculated from the following:    Height as of this encounter: 1.702 m (5' 7\").    Weight as of this encounter: 63.5 kg (140 lb).  Physical Exam   /68 (BP Location: Right arm, Patient Position: Sitting, Cuff Size: Adult Regular)   Pulse 68   Temp 97.5  F (36.4  C) (Oral)   Ht 1.702 m (5' 7\")   Wt 63.5 kg (140 lb)   SpO2 100%   BMI 21.93 kg/m      GENERAL: healthy, alert and no distress  EYES: Eyes grossly normal to inspection, PERRL and conjunctivae and sclerae normal  HENT: ear canals and TM's normal, nose and mouth without ulcers or lesions  NECK: no adenopathy, no asymmetry, masses, or scars and thyroid normal to palpation  RESP: lungs clear to auscultation - no rales, rhonchi or wheezes  CV: regular rate and rhythm, normal S1 S2, no S3 or S4, no murmur, click or rub, no peripheral edema and peripheral pulses strong  ABDOMEN: soft, nontender, no hepatosplenomegaly, no masses and bowel sounds normal  MS: See additional note  SKIN: no suspicious lesions or rashes  NEURO: Normal strength and tone, mentation intact and speech normal  PSYCH: mentation appears normal, affect normal/bright        ASSESSMENT / PLAN:     Problem List Items Addressed This Visit        Musculoskeletal and Integumentary    Rotator cuff syndrome, right     Intermittent.  Full abduction.  Discussed interventions.  He will consider physical therapy           Other Visit Diagnoses     Encounter for Medicare annual wellness exam    -  Primary    Relevant Orders    Lipid panel reflex to direct LDL Non-fasting    Special screening for malignant neoplasms, colon        Relevant Orders    GASTROENTEROLOGY ADULT REF PROCEDURE ONLY Keven Mcfadden [phone]; No Provider Preference          COUNSELING:  Reviewed preventive health counseling, as reflected in patient instructions    Estimated body mass index is 21.93 kg/m  as calculated from the following:   " " Height as of this encounter: 1.702 m (5' 7\").    Weight as of this encounter: 63.5 kg (140 lb).         reports that he has never smoked. He has never used smokeless tobacco.      Appropriate preventive services were discussed with this patient, including applicable screening as appropriate for cardiovascular disease, diabetes, osteopenia/osteoporosis, and glaucoma.  As appropriate for age/gender, discussed screening for colorectal cancer, prostate cancer, breast cancer, and cervical cancer. Checklist reviewing preventive services available has been given to the patient.    Reviewed patients plan of care and provided an AVS. The Basic Care Plan (routine screening as documented in Health Maintenance) for Vikram meets the Care Plan requirement. This Care Plan has been established and reviewed with the Patient.    Counseling Resources:  ATP IV Guidelines  Pooled Cohorts Equation Calculator  Breast Cancer Risk Calculator  FRAX Risk Assessment  ICSI Preventive Guidelines  Dietary Guidelines for Americans, 2010  USDA's MyPlate  ASA Prophylaxis  Lung CA Screening    Wilder Lion MD  [facility]    Identified Health Risks:  "

## 2020-01-13 NOTE — PATIENT INSTRUCTIONS
Patient Education   Personalized Prevention Plan  You are due for the preventive services outlined below.  Your care team is available to assist you in scheduling these services.  If you have already completed any of these items, please share that information with your care team to update in your medical record.  Health Maintenance Due   Topic Date Due     Discuss Advance Care Planning  1945     FALL RISK ASSESSMENT  05/18/2012     AORTIC ANEURYSM SCREENING (SYSTEM ASSIGNED)  05/18/2012     Annual Wellness Visit  05/30/2013     Cholesterol Lab  10/29/2019

## 2020-01-14 PROBLEM — M75.101 ROTATOR CUFF SYNDROME, RIGHT: Status: ACTIVE | Noted: 2020-01-14

## 2020-01-27 ENCOUNTER — TELEPHONE (OUTPATIENT)
Dept: TRANSPLANT | Facility: CLINIC | Age: 75
End: 2020-01-27

## 2020-01-27 NOTE — TELEPHONE ENCOUNTER
Received call from patient who is having a colonoscopy on Thursday 1/30 at 8am. Procedure will last less than 45 minutes. OK to hold mediations until after the procedure and take meds around 9am.

## 2020-01-30 ENCOUNTER — HOSPITAL ENCOUNTER (OUTPATIENT)
Facility: CLINIC | Age: 75
Discharge: HOME | End: 2020-01-30
Attending: INTERNAL MEDICINE | Admitting: INTERNAL MEDICINE
Payer: MEDICARE

## 2020-01-30 VITALS
OXYGEN SATURATION: 98 % | RESPIRATION RATE: 16 BRPM | SYSTOLIC BLOOD PRESSURE: 99 MMHG | DIASTOLIC BLOOD PRESSURE: 61 MMHG | HEART RATE: 74 BPM

## 2020-01-30 LAB — COLONOSCOPY: NORMAL

## 2020-01-30 PROCEDURE — 45385 COLONOSCOPY W/LESION REMOVAL: CPT | Performed by: INTERNAL MEDICINE

## 2020-01-30 PROCEDURE — 25000128 H RX IP 250 OP 636: Performed by: INTERNAL MEDICINE

## 2020-01-30 PROCEDURE — 88305 TISSUE EXAM BY PATHOLOGIST: CPT | Mod: 26 | Performed by: INTERNAL MEDICINE

## 2020-01-30 PROCEDURE — 88305 TISSUE EXAM BY PATHOLOGIST: CPT | Performed by: INTERNAL MEDICINE

## 2020-01-30 PROCEDURE — G0500 MOD SEDAT ENDO SERVICE >5YRS: HCPCS | Performed by: INTERNAL MEDICINE

## 2020-01-30 RX ORDER — FLUMAZENIL 0.1 MG/ML
0.2 INJECTION, SOLUTION INTRAVENOUS
Status: DISCONTINUED | OUTPATIENT
Start: 2020-01-30 | End: 2020-01-30 | Stop reason: HOSPADM

## 2020-01-30 RX ORDER — ONDANSETRON 2 MG/ML
4 INJECTION INTRAMUSCULAR; INTRAVENOUS EVERY 6 HOURS PRN
Status: DISCONTINUED | OUTPATIENT
Start: 2020-01-30 | End: 2020-01-30 | Stop reason: HOSPADM

## 2020-01-30 RX ORDER — ONDANSETRON 4 MG/1
4 TABLET, ORALLY DISINTEGRATING ORAL EVERY 6 HOURS PRN
Status: DISCONTINUED | OUTPATIENT
Start: 2020-01-30 | End: 2020-01-30 | Stop reason: HOSPADM

## 2020-01-30 RX ORDER — NALOXONE HYDROCHLORIDE 0.4 MG/ML
.1-.4 INJECTION, SOLUTION INTRAMUSCULAR; INTRAVENOUS; SUBCUTANEOUS
Status: DISCONTINUED | OUTPATIENT
Start: 2020-01-30 | End: 2020-01-30 | Stop reason: HOSPADM

## 2020-01-30 RX ORDER — ONDANSETRON 2 MG/ML
4 INJECTION INTRAMUSCULAR; INTRAVENOUS
Status: DISCONTINUED | OUTPATIENT
Start: 2020-01-30 | End: 2020-01-30 | Stop reason: HOSPADM

## 2020-01-30 RX ORDER — FENTANYL CITRATE 50 UG/ML
INJECTION, SOLUTION INTRAMUSCULAR; INTRAVENOUS PRN
Status: DISCONTINUED | OUTPATIENT
Start: 2020-01-30 | End: 2020-01-30 | Stop reason: HOSPADM

## 2020-01-30 RX ORDER — LIDOCAINE 40 MG/G
CREAM TOPICAL
Status: DISCONTINUED | OUTPATIENT
Start: 2020-01-30 | End: 2020-01-30 | Stop reason: HOSPADM

## 2020-01-30 NOTE — LETTER
January 16, 2020      Vikram Wetzel  [address] [address]        Dear Vikram,         Thank you for choosing [facility]. You are scheduled for the following service(s).   Please be aware that coverage of these services is subject to the terms and limitations of your health insurance plan.  Call member services at your health plan with any benefit or coverage questions.  Date:  1-30-20             Procedure:  COLONOSCOPY  Doctor:        Lev   Arrival Time:  0830   *check in at Emergency/Endoscopy desk*  Procedure Time: 0900     Location:   [facility]        Endoscopy Department, First Floor (Enter through ER Doors) *        [address] [place], [place] [address] [phone] or [phone] () to reschedule      Colonoscopy is the most accurate test to detect colon polyps and colon cancer; and the only test where polyps can be removed. During this procedure, a doctor examines the lining of your large intestine and rectum through a flexible tube.     Transportation  You must arrange for a ride for the day of your procedure with a responsible adult. A taxi , Uber, etc, is not an option unless you are accompanied by a responsible adult. If you fail to arrange transportation with a responsible adult, your procedure will be cancelled and rescheduled.    MIRALAX -GATORADE  PREP  (without magnesium citrate)    Purchase the  following supplies at your local pharmacy:  - 2 (two) bisacodyl tablets: each tablet contains 5 mg.  (Dulcolax  laxative NOT Dulcolax  stool softener)   - 1 (one) 8.3 oz bottle of Polyethylene Glycol (PEG) 3350 Powder   (MiraLAX , Smooth LAX , ClearLAX  or equivalent)  - 64 oz Gatorade    Regular Gatorade, Gatorade G2 , Powerade , Powerade Zero  or Pedialyte  is acceptable. Red colored flavors are not allowed; all other colors (yellow, green, orange, purple and blue) are okay. It is also okay to buy two  2.12 oz packets of powdered Gatorade that can be mixed with water to a total volume of 64 oz of liquid.  PREPARATION FOR COLONOSCOPY  7 days before:    Discontinue fiber supplements and medications containing iron. This includes Metamucil  and Fibercon ; and multivitamins with iron.  3 days before:    Begin a low-fiber diet. A low-fiber diet helps making the cleanout more effective.     Examples of a low-fiber diet include (but are not limited to): white bread, white rice, pasta, crackers, fish, chicken, eggs, ground beef, creamy peanut butter, cooked/steamed/boiled vegetables, canned fruit, bananas, melons, milk, plain yogurt cheese, salad dressing and other condiments.     The following are not allowed on a low-fiber diet: seeds, nuts, popcorn, bran, whole wheat, corn, quinoa, raw fruits and vegetables, berries and dried fruit, beans and lentils.    For additional details on low-fiber diet, please refer to the table on the last page.  2 days before:    Continue the low-fiber diet.     Drink at least 8 glasses of water throughout the day.     Stop eating solid foods at 11:45 pm.  1. 1 day before:    In the morning: begin a clear liquid diet (liquids you can see through).     Examples of a clear liquid diet include: water, clear broth or bouillon, Gatorade, Pedialyte or Powerade, carbonated and non-carbonated soft drinks (Sprite , 7-Up , ginger ale), strained fruit juices without pulp (apple, white grape, white cranberry), Jell-O  and popsicles.     The following are not allowed on a clear liquid diet: red liquids, alcoholic beverages,  dairy products (milk, creamer, and yogurt), protein shakes,  juice with pulp and chewing tobacco.    At noon: take 2 (two) bisacodyl tablets     At 4 (and no later than 6pm): start drinking the Miralax-Gatorade preparation (8.3 oz of Miralax mixed with 64 oz of Gatorade in a large pitcher). Drink 1(one) 8 oz glass every 15 minutes thereafter, until the mixture is gone.    COLON  CLEANSING TIPS: drink adequate amounts of fluids before and after your colon cleansing to prevent dehydration. Stay near a toilet because you will have diarrhea. Even if you are sitting on the toilet, continue to drink the cleansing solution every 15 minutes. If you feel nauseous or vomit, rinse your mouth with water, take a 15 to 30-minute-break and then continue drinking the solution. You will be uncomfortable until the stool has flushed from your colon (in about 2 to 4 hours). You may feel chilled.    Day of your procedure  You may take all of your morning medications including blood pressure medications, blood thinners (if you have not been instructed to stop these by our office), methadone, anti-seizure medications with sips of water 3 hours prior to your procedure or earlier. Do not take insulin or vitamins prior to your procedure. Continue the clear liquid diet.   4 hours prior:     STOP consuming all liquids     Do not take anything by mouth during this time.     Allow extra time to travel to your procedure as you may need to stop and use a restroom along the way.  You are ready for the procedure, if you followed all instructions and your stool is no longer formed, but clear or yellow liquid. If you are unsure whether your colon is clean, please call our office at [phone] before you leave for your appointment.  Bring the following to your procedure:  - Insurance Card/Photo ID.   - List of current medications including over-the-counter medications and supplements.   - Your rescue inhaler if you currently use one to control asthma.    Canceling or rescheduling your appointment:   If you must cancel or reschedule your appointment, please call [phone] as soon as possible.      COLONOSCOPY PRE-PROCEDURE CHECKLIST  If you have diabetes, ask your regular doctor for diet and medication restrictions.  If you take an anticoagulant or anti-platelet medication (such as Coumadin , Lovenox , Pradaxa , Xarelto ,  Eliquis , etc.), please call your primary doctor for advice on holding this medication.  If you take aspirin you may continue to do so.  If you are or may be pregnant, please discuss the risks and benefits of this procedure with your doctor.    What happens during a colonoscopy?  Plan to spend up to two hours, starting at registration time, at the endoscopy center the day of your procedure. The colonoscopy takes an average of 15 to 30 minutes. Recovery time is about 30 minutes.   Before the exam:    You will change into a gown.    Your medical history and medication list will be reviewed with you, unless that has been done over the phone prior to the procedure.     A nurse will insert an intravenous (IV) line into your hand or arm.    The doctor will meet with you and will give you a consent form to sign.  1. During the exam:     Medicine will be given through the IV line to help you relax.     Your heart rate and oxygen levels will be monitored. If your blood pressure is low, you may be given fluids through the IV line.     The doctor will insert a flexible hollow tube, called a colonoscope, into your rectum. The scope will be advanced slowly through the large intestine (colon).    You may have a feeling of fullness or pressure.     If an abnormal tissue or a polyp is found, the doctor may remove it through the endoscope for closer examination, or biopsy. Tissue removal is painless  After the exam:           Any tissue samples removed during the exam will be sent to a lab for evaluation. It may take 5-7 working days for you to be notified of the results.     A nurse will provide you with complete discharge instructions before you leave the endoscopy center. Be sure to ask the nurse for specific instructions if you take blood thinners such as Aspirin, Coumadin or Plavix.     The doctor will prepare a full report for you and for the physician who referred you for the procedure.     Your doctor will talk with you  about the initial results of your exam.      Medication given during the exam will prohibit you from driving for the rest of the day.     Following the exam, you may resume your normal diet. Your first meal should be light, no greasy foods. Avoid alcohol until the next day.     You may resume your regular activities the day after the procedure.       LOW-FIBER DIET  Foods RECOMMENDED Foods to AVOID   Breads, Cereal, Rice and Pasta:   White bread, rolls, biscuits, croissant and gladis toast.   Waffles, [place] toast and pancakes.   White rice, noodles, pasta, macaroni and peeled cooked potatoes.   Plain crackers and saltines.   Cooked cereals: farina, cream of rice.   Cold cereals: Puffed Rice , Rice Krispies , Corn Flakes  and Special K    Breads, Cereal, Rice and Pasta:   Breads or rolls with nuts, seeds or fruit.   Whole wheat, pumpernickel, rye breads and cornbread.   Potatoes with skin, brown or wild rice, and kasha (buckwheat).     Vegetables:   Tender cooked and canned vegetables without seeds: carrots, asparagus tips, green or wax beans, pumpkin, spinach, lima beans. Vegetables:   Raw or steamed vegetables.   Vegetables with seeds.   Sauerkraut.   Winter squash, peas, broccoli, Brussel sprouts, cabbage, onions, cauliflower, baked beans, peas and corn.   Fruits:   Strained fruit juice.   Canned fruit, except pineapple.   Ripe bananas and melon. Fruits:   Prunes and prune juice.   Raw fruits.   Dried fruits: figs, dates and raisins.   Milk/Dairy:   Milk: plain or flavored.   Yogurt, custard and ice cream.   Cheese and cottage cheese Milk/Dairy:     Meat and other proteins:   ground, well-cooked tender beef, lamb, ham, veal, pork, fish, poultry and organ meats.   Eggs.   Peanut butter without nuts. Meat and other proteins:   Tough, fibrous meats with gristle.   Dry beans, peas and lentils.   Peanut butter with nuts.   Tofu.   Fats, Snack, Sweets, Condiments and Beverages:   Margarine, butter, oils, mayonnaise,  sour cream and salad dressing, plain gravy.   Sugar, hard candy, clear jelly, honey and syrup.   Spices, cooked herbs, bouillon, broth and soups made with allowed vegetable, ketchup and mustard.   Coffee, tea and carbonated drinks.   Plain cakes, cookies and pretzels.   Gelatin, plain puddings, custard, ice cream, sherbet and popsicles. Fats, Snack, Sweets, Condiments and Beverages:   Nuts, seeds and coconut.   Jam, marmalade and preserves.   Pickles, olives, relish and horseradish.   All desserts containing nuts, seeds, dried fruit and coconut; or made from whole grains or bran.   Candy made with nuts or seeds.   Popcorn.       DIRECTIONS TO THE ENDOSCOPY DEPARTMENT    From the north ([facility])  Take [address] South, exit on [address] [address]. Get into the left hand godwin, turn left (east), go one-half mile to [address] and turn left. Go north to the first stoplight, take a right on [address] and follow it to the Emergency entrance.    From the south ([facility])  Take 35N to the [address]E split and exit on [address] . On [address] , turn left (west) to [address]. Turn right (north) on [address]. Go north to the first stoplight, take a right on [address] and follow it to the Emergency entrance.    From the east via 35E ([facility])  Take [address] south to [address] [address] exit. Turn right on [address] . Go west to [address]. Turn right (north) on [address]. Go to the first stoplight, take a right and follow on [address] to the Emergency entrance.    From the east via [address] ([facility])  Take [address] to [address]. Turn left (south) on [address] to [address]. Turn left (east) on [address] and follow it to the Emergency entrance.    From the west via [address] 13 ([place])  Take [address] east to [address]. Turn right (south) on [address] to [address]. Turn left (east) on  [address] and follow it to the Emergency entrance.

## 2020-01-30 NOTE — H&P
Pre-Endoscopy History and Physical     Vikram Wetzel MRN# [id number]   YOB: 1945 Age: 74 year old     Date of Procedure: 1/30/2020  Primary care provider: Wilder Loin  Type of Endoscopy: Colonoscopy with possible biopsy, possible polypectomy  Reason for Procedure: screen  Type of Anesthesia Anticipated: Conscious Sedation    HPI:    Vikram is a 74 year old male who will be undergoing the above procedure.      A history and physical has been performed. The patient's medications and allergies have been reviewed. The risks and benefits of the procedure and the sedation options and risks were discussed with the patient.  All questions were answered and informed consent was obtained.      He denies a personal or family history of anesthesia complications or bleeding disorders.     Patient Active Problem List   Diagnosis     Leukopenia     Dyslipidemia     Paroxysmal atrial fibrillation (H)     Immunosuppressed status (H)     Kidney replaced by transplant     AV block     Hemorrhoids, unspecified hemorrhoid type     Aftercare following organ transplant     Rotator cuff syndrome, right        Past Medical History:   Diagnosis Date     Anemia in chronic kidney disease(285.21)      Arrhythmia     pac, pvc     AV block     hi grade 2nd degree AVB at night ?sleep apnea w/u in progress-waqar     Dyslipidemia      End stage kidney disease (H)      High risk medication use      Hypertension      Immunosuppressed status (H)      Kidney replaced by transplant 5/3/14     Paroxysmal atrial fibrillation (H)         Past Surgical History:   Procedure Laterality Date     AV FISTULA OR GRAFT ARTERIAL       CREATE FISTULA ARTERIOVENOUS LOWER EXTREMITY  12/9/2011    Procedure:CREATE FISTULA ARTERIOVENOUS UPPER EXTREMITY; LEFT ARTERIOVENOUS FISTULA WITH WRIST VEIN; Surgeon:SYMONE ROSS; Location:[facility]     CYSTOSCOPY, REMOVE STENT(S) CHILD, COMBINED  6/3/2014    Procedure: COMBINED CYSTOSCOPY, REMOVE STENT(S);   "Surgeon: Aneesh Perera MD;  Location: [facility]     TONSILLECTOMY       TRANSPLANT KIDNEY RECIPIENT  DONOR  5/3/2014    Procedure: TRANSPLANT KIDNEY RECIPIENT  DONOR;  Surgeon: Aneesh Perera MD;  Location[facility]       Social History     Tobacco Use     Smoking status: Never Smoker     Smokeless tobacco: Never Used   Substance Use Topics     Alcohol use: No     Alcohol/week: 0.0 standard drinks       Family History   Problem Relation Age of Onset     Dementia Mother         alcohol onset     Dementia Father         alcohol onset     Cancer Brother         Lymphoma/lymph nodes     Colon Cancer Brother      Cancer Sister      Other - See Comments Brother         colostomy bag       Prior to Admission medications    Medication Sig Start Date End Date Taking? Authorizing Provider   aspirin 81 MG tablet Take 1 tablet by mouth daily.   Yes Reported, Patient   carvedilol (COREG) 3.125 MG tablet Take 1 tablet (3.125 mg) by mouth 2 times daily (with meals) 19  Yes Andres Reinoso MD   CELLCEPT (BRAND) 250 MG capsule Take 2 capsules (500 mg) by mouth 2 times daily 19  Yes Sarkis Prieto MD   PROGRAF (BRAND) 0.5 MG capsule HOLD 19  Yes Sarkis Prieto MD   PROGRAF (BRAND) 1 MG capsule Take 1 capsule (1 mg) by mouth 2 times daily 19  Yes Sarkis Prieto MD   sulfamethoxazole-trimethoprim (BACTRIM/SEPTRA) 400-80 MG tablet Take 1 tablet by mouth three times a week (, , ) 19  Yes Sarkis Prieto MD       No Known Allergies     REVIEW OF SYSTEMS:   5 point ROS negative except as noted above in HPI, including Gen., Resp., CV, GI &  system review.    PHYSICAL EXAM:   There were no vitals taken for this visit. Estimated body mass index is 21.93 kg/m  as calculated from the following:    Height as of 20: 1.702 m (5' 7\").    Weight as of 20: 63.5 kg (140 lb).   GENERAL APPEARANCE: alert, and oriented  MENTAL STATUS: " alert  AIRWAY EXAM: Mallampatti Class II (visualization of the soft palate, fauces, and uvula)  RESP: lungs clear to auscultation - no rales, rhonchi or wheezes  CV: regular rates and rhythm  DIAGNOSTICS:    Not indicated    IMPRESSION   ASA Class 3 - Severe systemic disease, but not incapacitating    PLAN:   Plan for Colonoscopy with possible biopsy, possible polypectomy. We discussed the risks, benefits and alternatives and the patient wished to proceed.    The above has been forwarded to the consulting provider.      Signed Electronically by: Adan Ohara MD  January 30, 2020

## 2020-01-30 NOTE — DISCHARGE INSTRUCTIONS

## 2020-01-31 LAB — COPATH REPORT: NORMAL

## 2020-02-05 ENCOUNTER — RESULTS ONLY (OUTPATIENT)
Dept: OTHER | Facility: CLINIC | Age: 75
End: 2020-02-05

## 2020-02-05 DIAGNOSIS — Z79.899 ENCOUNTER FOR LONG-TERM CURRENT USE OF MEDICATION: ICD-10-CM

## 2020-02-05 DIAGNOSIS — Z94.0 KIDNEY REPLACED BY TRANSPLANT: Primary | ICD-10-CM

## 2020-02-05 DIAGNOSIS — Z94.0 KIDNEY REPLACED BY TRANSPLANT: ICD-10-CM

## 2020-02-05 DIAGNOSIS — Z48.298 AFTERCARE FOLLOWING ORGAN TRANSPLANT: ICD-10-CM

## 2020-02-05 DIAGNOSIS — Z00.00 ENCOUNTER FOR MEDICARE ANNUAL WELLNESS EXAM: ICD-10-CM

## 2020-02-05 LAB
ANION GAP SERPL CALCULATED.3IONS-SCNC: 7 MMOL/L (ref 3–14)
BUN SERPL-MCNC: 26 MG/DL (ref 7–30)
CALCIUM SERPL-MCNC: 9.5 MG/DL (ref 8.5–10.1)
CHLORIDE SERPL-SCNC: 108 MMOL/L (ref 94–109)
CHOLEST SERPL-MCNC: 194 MG/DL
CO2 SERPL-SCNC: 26 MMOL/L (ref 20–32)
CREAT SERPL-MCNC: 1.29 MG/DL (ref 0.66–1.25)
CREAT UR-MCNC: 173 MG/DL
ERYTHROCYTE [DISTWIDTH] IN BLOOD BY AUTOMATED COUNT: 13.1 % (ref 10–15)
GFR SERPL CREATININE-BSD FRML MDRD: 55 ML/MIN/{1.73_M2}
GLUCOSE SERPL-MCNC: 100 MG/DL (ref 70–99)
HCT VFR BLD AUTO: 41.1 % (ref 40–53)
HDLC SERPL-MCNC: 67 MG/DL
HGB BLD-MCNC: 12.8 G/DL (ref 13.3–17.7)
LDLC SERPL CALC-MCNC: 117 MG/DL
MCH RBC QN AUTO: 28.4 PG (ref 26.5–33)
MCHC RBC AUTO-ENTMCNC: 31.1 G/DL (ref 31.5–36.5)
MCV RBC AUTO: 91 FL (ref 78–100)
NONHDLC SERPL-MCNC: 127 MG/DL
PLATELET # BLD AUTO: 133 10E9/L (ref 150–450)
POTASSIUM SERPL-SCNC: 4.4 MMOL/L (ref 3.4–5.3)
PROT UR-MCNC: 0.15 G/L
PROT/CREAT 24H UR: 0.09 G/G CR (ref 0–0.2)
RBC # BLD AUTO: 4.5 10E12/L (ref 4.4–5.9)
SODIUM SERPL-SCNC: 141 MMOL/L (ref 133–144)
TACROLIMUS BLD-MCNC: 4.4 UG/L (ref 5–15)
TME LAST DOSE: 2000 H
TRIGL SERPL-MCNC: 51 MG/DL
WBC # BLD AUTO: 2.6 10E9/L (ref 4–11)

## 2020-02-05 PROCEDURE — 80048 BASIC METABOLIC PNL TOTAL CA: CPT | Performed by: INTERNAL MEDICINE

## 2020-02-05 PROCEDURE — 80061 LIPID PANEL: CPT | Performed by: INTERNAL MEDICINE

## 2020-02-05 PROCEDURE — 87799 DETECT AGENT NOS DNA QUANT: CPT | Performed by: INTERNAL MEDICINE

## 2020-02-05 PROCEDURE — 80197 ASSAY OF TACROLIMUS: CPT | Performed by: INTERNAL MEDICINE

## 2020-02-05 PROCEDURE — 36415 COLL VENOUS BLD VENIPUNCTURE: CPT | Performed by: INTERNAL MEDICINE

## 2020-02-05 PROCEDURE — 86833 HLA CLASS II HIGH DEFIN QUAL: CPT | Performed by: INTERNAL MEDICINE

## 2020-02-05 PROCEDURE — 84156 ASSAY OF PROTEIN URINE: CPT | Performed by: INTERNAL MEDICINE

## 2020-02-05 PROCEDURE — 86832 HLA CLASS I HIGH DEFIN QUAL: CPT | Performed by: INTERNAL MEDICINE

## 2020-02-05 PROCEDURE — 85027 COMPLETE CBC AUTOMATED: CPT | Performed by: INTERNAL MEDICINE

## 2020-03-16 DIAGNOSIS — Z48.298 AFTERCARE FOLLOWING ORGAN TRANSPLANT: ICD-10-CM

## 2020-03-16 RX ORDER — CARVEDILOL 3.12 MG/1
3.12 TABLET ORAL 2 TIMES DAILY WITH MEALS
Qty: 180 TABLET | Refills: 3 | Status: SHIPPED | OUTPATIENT
Start: 2020-03-16 | End: 2021-04-09

## 2020-04-28 ENCOUNTER — MYC MEDICAL ADVICE (OUTPATIENT)
Dept: TRANSPLANT | Facility: CLINIC | Age: 75
End: 2020-04-28

## 2020-04-28 NOTE — TELEPHONE ENCOUNTER
In light of the COVID 19 pandemic, [facility] cares about your safety and we are calling to convert your scheduled in-person clinic visit to a video visit on 5/5/20 date.  The appointment will be on the same date and time.  What is the best number for the provider to call you at?  [phone]

## 2020-04-29 DIAGNOSIS — Z48.298 AFTERCARE FOLLOWING ORGAN TRANSPLANT: ICD-10-CM

## 2020-04-29 DIAGNOSIS — Z94.0 KIDNEY REPLACED BY TRANSPLANT: ICD-10-CM

## 2020-04-29 DIAGNOSIS — Z79.899 ENCOUNTER FOR LONG-TERM CURRENT USE OF MEDICATION: ICD-10-CM

## 2020-04-29 LAB
ANION GAP SERPL CALCULATED.3IONS-SCNC: 4 MMOL/L (ref 3–14)
BUN SERPL-MCNC: 23 MG/DL (ref 7–30)
CALCIUM SERPL-MCNC: 9.6 MG/DL (ref 8.5–10.1)
CHLORIDE SERPL-SCNC: 104 MMOL/L (ref 94–109)
CO2 SERPL-SCNC: 28 MMOL/L (ref 20–32)
CREAT SERPL-MCNC: 1.13 MG/DL (ref 0.66–1.25)
CREAT UR-MCNC: 85 MG/DL
ERYTHROCYTE [DISTWIDTH] IN BLOOD BY AUTOMATED COUNT: 12.9 % (ref 10–15)
GFR SERPL CREATININE-BSD FRML MDRD: 64 ML/MIN/{1.73_M2}
GLUCOSE SERPL-MCNC: 93 MG/DL (ref 70–99)
HCT VFR BLD AUTO: 41.8 % (ref 40–53)
HGB BLD-MCNC: 13.4 G/DL (ref 13.3–17.7)
MCH RBC QN AUTO: 29 PG (ref 26.5–33)
MCHC RBC AUTO-ENTMCNC: 32.1 G/DL (ref 31.5–36.5)
MCV RBC AUTO: 91 FL (ref 78–100)
PLATELET # BLD AUTO: 121 10E9/L (ref 150–450)
POTASSIUM SERPL-SCNC: 4.9 MMOL/L (ref 3.4–5.3)
PROT UR-MCNC: 0.09 G/L
PROT/CREAT 24H UR: 0.11 G/G CR (ref 0–0.2)
RBC # BLD AUTO: 4.62 10E12/L (ref 4.4–5.9)
SODIUM SERPL-SCNC: 136 MMOL/L (ref 133–144)
TACROLIMUS BLD-MCNC: 5 UG/L (ref 5–15)
TME LAST DOSE: NORMAL H
WBC # BLD AUTO: 3.3 10E9/L (ref 4–11)

## 2020-04-29 PROCEDURE — 36415 COLL VENOUS BLD VENIPUNCTURE: CPT | Performed by: INTERNAL MEDICINE

## 2020-04-29 PROCEDURE — 84156 ASSAY OF PROTEIN URINE: CPT | Performed by: INTERNAL MEDICINE

## 2020-04-29 PROCEDURE — 80048 BASIC METABOLIC PNL TOTAL CA: CPT | Performed by: INTERNAL MEDICINE

## 2020-04-29 PROCEDURE — 85027 COMPLETE CBC AUTOMATED: CPT | Performed by: INTERNAL MEDICINE

## 2020-04-29 PROCEDURE — 80197 ASSAY OF TACROLIMUS: CPT | Performed by: INTERNAL MEDICINE

## 2020-05-05 ENCOUNTER — VIRTUAL VISIT (OUTPATIENT)
Dept: NEPHROLOGY | Facility: CLINIC | Age: 75
End: 2020-05-05
Attending: INTERNAL MEDICINE
Payer: MEDICARE

## 2020-05-05 DIAGNOSIS — Z94.0 HTN, KIDNEY TRANSPLANT RELATED: ICD-10-CM

## 2020-05-05 DIAGNOSIS — I15.1 HTN, KIDNEY TRANSPLANT RELATED: ICD-10-CM

## 2020-05-05 DIAGNOSIS — Z94.0 KIDNEY TRANSPLANTED: ICD-10-CM

## 2020-05-05 DIAGNOSIS — D84.9 IMMUNOSUPPRESSED STATUS (H): ICD-10-CM

## 2020-05-05 DIAGNOSIS — Z48.298 AFTERCARE FOLLOWING ORGAN TRANSPLANT: Primary | ICD-10-CM

## 2020-05-05 NOTE — PROGRESS NOTES
"Vikram Wetzel is a 74 year old male who is being evaluated via a billable video visit.      The patient has been notified of following:     \"This video/telephone visit will be conducted via a call between you and your physician/provider. We have found that certain health care needs can be provided without the need for an in-person physical exam.  This service lets us provide the care you need with a video conversation.  If a prescription is necessary we can send it directly to your pharmacy.  If lab work is needed we can place an order for that and you can then stop by our lab to have the test done at a later time.    Video visits are billed at different rates depending on your insurance coverage.  Please reach out to your insurance provider with any questions.    If during the course of the call the physician/provider feels a video visit is not appropriate, you will not be charged for this service.\"    Patient has given verbal consent for Video visit? Yes    How would you like to obtain your AVS? Mail a copy    Patient would like the video invitation sent by: Send to e-mail at: [email or web address]    Will anyone else be joining your video visit?           Patient is having issues with video call/ Please try if it doesn't work please call him on home phone at [phone].     telephone-Visit Details    Type of service:  telephone Visit     Video Start Time: 12:59 PM  Video End Time: 1:15 PM    Originating Location (pt. Location): Home    Distant Location (provider location):  [facility] NEPHROLOG[facility]     Platform used for Video Visit: Simón Hawley MD    TRANSPLANT NEPHROLOGY TELEMEDICINE VISIT    Vikram Wetzel is a 74 year old male who is being evaluated via a billable telemedicine (video/telephone) visit on May 5, 2020.     The patient has been notified of following:     \"This telemedicine (video/telephone) visit will be conducted via a video/phone call between you and your physician. We have found that " "certain health care needs can be provided without the need for a physical exam.  This service lets us provide the care you need with a short video/phone conversation.  If a prescription is necessary, we can send it directly to your pharmacy.  If lab work is needed, we can place an order for that and you can then stop by our lab to have the test done at a later time.    If during the course of this video/phone call the physician feels a telemedicine (video/telephone) visit is not appropriate, you will not be charged for this service and an in clinic visit will be arranged at a later date.\"     Assessment & Plan   # DDKT: Stable   - Baseline Cr ~ 1.1-1.5   - Proteinuria: Normal (<0.2 grams)   - Date DSA Last Checked: none 2/5/20   - BK Viremia: No   - Kidney Tx Biopsy: no    # Immunosuppression: Tacrolimus immediate release (goal 4-6) and Mycophenolate mofetil (goal 4-6)   - Changes: No     Tac last 5 ng / ml    # Infection Prophylaxis:   - PJP: Sulfa/TMP (Bactrim)    # Hypertension: Controlled;  Goal BP: < 130/80   - Changes: No    # Mineral Bone Disorder:   pth not checked recently, vit d not checked recently, calcium last 9.6 mg / dl    # Skin Cancer Risk:    - Discussed sun protection and recommend regular follow up with Dermatology.    # Medical Compliance: Yes    # COVID-19 Virus Review: Discussed COVID-19 virus and the potential medical risks.  Reviewed preventative health recommendations, which includes washing hands for 20 seconds, avoid touching your face, and social distancing.  Asked patient to inform the transplant center if they are exposed or diagnosed with this virus.    # Transplant History:  Etiology of Kidney Failure: Unknown etiology  Tx: DDKT  Significant changes in immunosuppression: None  Significant transplant-related complications: None    Transplant Office Phone Number: [phone]    Assessment and plan was discussed with the patient and he voiced his understanding and agreement.    Return " Visit: 12 months      Vikram Wetzel has a clinical telephone visit for kidney transplant and immunosuppression management.    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    The patient was last seen in May 2019.  Since that time, the patient has felt well.  He specifically denies any chest pain or breathing difficulties.  He has no nausea or vomiting.  Is no fever shakes or chills.  Is moving his bowels appropriately.    The patient does note that he has been having some plantars fasciitis for which she is being managed by his primary care provider.    From an allograft perspective, his function is excellent with his most recent creatinine of 1.1 mg/dL.  The patient is on immunosuppression with tacrolimus and CellCept.  His most recent tacrolimus level was 5 ng/mL which is at target.    The patient's blood pressure was last checked in his physician's office and he was told that he was at goal.    Recent Hospitalizations:  [x] No [] Yes    New Medical Issues: [x] No [] Yes    Decreased energy: [x] No [] Yes    Chest pain or SOB with exertion:  [x] No [] Yes    Appetite change or weight change: [x] No [] Yes    Nausea, vomiting or diarrhea:  [x] No [] Yes    Fever, sweats or chills: [x] No [] Yes    Leg swelling: [x] No [] Yes      Home BP: at goal    Review of Systems   A comprehensive review of systems was obtained and negative, except as noted in the HPI or PMH.    I have reviewed and updated the patient's Past Medical History, Social History, and Medication List.    Active Medical Problems  Patient Active Problem List   Diagnosis     Leukopenia     Dyslipidemia     Paroxysmal atrial fibrillation (H)     Immunosuppressed status (H)     Kidney replaced  by transplant     AV block     Hemorrhoids, unspecified hemorrhoid type     Aftercare following organ transplant     Rotator cuff syndrome, right     Allergies  Patient has no known allergies.    Medications  Current Outpatient Medications   Medication Sig     aspirin 81 MG tablet Take 1 tablet by mouth daily.     carvedilol (COREG) 3.125 MG tablet Take 1 tablet (3.125 mg) by mouth 2 times daily (with meals)     CELLCEPT (BRAND) 250 MG capsule Take 2 capsules (500 mg) by mouth 2 times daily     PROGRAF (BRAND) 0.5 MG capsule HOLD     PROGRAF (BRAND) 1 MG capsule Take 1 capsule (1 mg) by mouth 2 times daily     sulfamethoxazole-trimethoprim (BACTRIM/SEPTRA) 400-80 MG tablet Take 1 tablet by mouth three times a week (Mondays, Wednesdays, Fridays)     No current facility-administered medications for this visit.      Facility-Administered Medications Ordered in Other Visits   Medication     ferumoxytol (FERAHEME) injection 510 mg     ferumoxytol (FERAHEME) injection 510 mg

## 2020-07-13 DIAGNOSIS — Z94.0 KIDNEY TRANSPLANTED: Primary | ICD-10-CM

## 2020-07-13 RX ORDER — TACROLIMUS 1 MG/1
1 CAPSULE, GELATIN COATED ORAL 2 TIMES DAILY
Qty: 60 CAPSULE | Refills: 11 | Status: SHIPPED | OUTPATIENT
Start: 2020-07-13 | End: 2021-07-08

## 2020-08-05 DIAGNOSIS — Z94.0 KIDNEY REPLACED BY TRANSPLANT: ICD-10-CM

## 2020-08-05 DIAGNOSIS — Z79.899 ENCOUNTER FOR LONG-TERM CURRENT USE OF MEDICATION: ICD-10-CM

## 2020-08-05 DIAGNOSIS — Z48.298 AFTERCARE FOLLOWING ORGAN TRANSPLANT: ICD-10-CM

## 2020-08-05 LAB
ANION GAP SERPL CALCULATED.3IONS-SCNC: 7 MMOL/L (ref 3–14)
BUN SERPL-MCNC: 17 MG/DL (ref 7–30)
CALCIUM SERPL-MCNC: 9.5 MG/DL (ref 8.5–10.1)
CHLORIDE SERPL-SCNC: 105 MMOL/L (ref 94–109)
CO2 SERPL-SCNC: 25 MMOL/L (ref 20–32)
CREAT SERPL-MCNC: 1.29 MG/DL (ref 0.66–1.25)
ERYTHROCYTE [DISTWIDTH] IN BLOOD BY AUTOMATED COUNT: 12.8 % (ref 10–15)
GFR SERPL CREATININE-BSD FRML MDRD: 55 ML/MIN/{1.73_M2}
GLUCOSE SERPL-MCNC: 86 MG/DL (ref 70–99)
HCT VFR BLD AUTO: 41.4 % (ref 40–53)
HGB BLD-MCNC: 13.4 G/DL (ref 13.3–17.7)
MCH RBC QN AUTO: 29.5 PG (ref 26.5–33)
MCHC RBC AUTO-ENTMCNC: 32.4 G/DL (ref 31.5–36.5)
MCV RBC AUTO: 91 FL (ref 78–100)
PLATELET # BLD AUTO: 131 10E9/L (ref 150–450)
POTASSIUM SERPL-SCNC: 4.8 MMOL/L (ref 3.4–5.3)
RBC # BLD AUTO: 4.55 10E12/L (ref 4.4–5.9)
SODIUM SERPL-SCNC: 137 MMOL/L (ref 133–144)
TACROLIMUS BLD-MCNC: 4.5 UG/L (ref 5–15)
TME LAST DOSE: ABNORMAL H
WBC # BLD AUTO: 2.6 10E9/L (ref 4–11)

## 2020-08-05 PROCEDURE — 85027 COMPLETE CBC AUTOMATED: CPT | Performed by: INTERNAL MEDICINE

## 2020-08-05 PROCEDURE — 80048 BASIC METABOLIC PNL TOTAL CA: CPT | Performed by: INTERNAL MEDICINE

## 2020-08-05 PROCEDURE — 80197 ASSAY OF TACROLIMUS: CPT | Performed by: INTERNAL MEDICINE

## 2020-08-05 PROCEDURE — 36415 COLL VENOUS BLD VENIPUNCTURE: CPT | Performed by: INTERNAL MEDICINE

## 2020-08-31 DIAGNOSIS — Z94.0 STATUS POST KIDNEY TRANSPLANT: Primary | ICD-10-CM

## 2020-08-31 RX ORDER — SULFAMETHOXAZOLE AND TRIMETHOPRIM 400; 80 MG/1; MG/1
1 TABLET ORAL
Qty: 36 TABLET | Refills: 3 | Status: SHIPPED | OUTPATIENT
Start: 2020-08-31 | End: 2021-08-02

## 2020-11-04 DIAGNOSIS — Z94.0 KIDNEY REPLACED BY TRANSPLANT: ICD-10-CM

## 2020-11-04 DIAGNOSIS — Z79.899 ENCOUNTER FOR LONG-TERM CURRENT USE OF MEDICATION: ICD-10-CM

## 2020-11-04 DIAGNOSIS — Z48.298 AFTERCARE FOLLOWING ORGAN TRANSPLANT: ICD-10-CM

## 2020-11-04 LAB
ANION GAP SERPL CALCULATED.3IONS-SCNC: 1 MMOL/L (ref 3–14)
BUN SERPL-MCNC: 22 MG/DL (ref 7–30)
CALCIUM SERPL-MCNC: 9.8 MG/DL (ref 8.5–10.1)
CHLORIDE SERPL-SCNC: 107 MMOL/L (ref 94–109)
CO2 SERPL-SCNC: 29 MMOL/L (ref 20–32)
CREAT SERPL-MCNC: 1.3 MG/DL (ref 0.66–1.25)
CREAT UR-MCNC: 130 MG/DL
ERYTHROCYTE [DISTWIDTH] IN BLOOD BY AUTOMATED COUNT: 12.7 % (ref 10–15)
GFR SERPL CREATININE-BSD FRML MDRD: 54 ML/MIN/{1.73_M2}
GLUCOSE SERPL-MCNC: 100 MG/DL (ref 70–99)
HCT VFR BLD AUTO: 43.3 % (ref 40–53)
HGB BLD-MCNC: 13.8 G/DL (ref 13.3–17.7)
MCH RBC QN AUTO: 29 PG (ref 26.5–33)
MCHC RBC AUTO-ENTMCNC: 31.9 G/DL (ref 31.5–36.5)
MCV RBC AUTO: 91 FL (ref 78–100)
PLATELET # BLD AUTO: 126 10E9/L (ref 150–450)
POTASSIUM SERPL-SCNC: 4.5 MMOL/L (ref 3.4–5.3)
PROT UR-MCNC: 0.11 G/L
PROT/CREAT 24H UR: 0.09 G/G CR (ref 0–0.2)
RBC # BLD AUTO: 4.76 10E12/L (ref 4.4–5.9)
SODIUM SERPL-SCNC: 137 MMOL/L (ref 133–144)
TACROLIMUS BLD-MCNC: 4.1 UG/L (ref 5–15)
TME LAST DOSE: 2000 H
WBC # BLD AUTO: 2.7 10E9/L (ref 4–11)

## 2020-11-04 PROCEDURE — 36415 COLL VENOUS BLD VENIPUNCTURE: CPT | Performed by: INTERNAL MEDICINE

## 2020-11-04 PROCEDURE — 80197 ASSAY OF TACROLIMUS: CPT | Performed by: INTERNAL MEDICINE

## 2020-11-04 PROCEDURE — 84156 ASSAY OF PROTEIN URINE: CPT | Performed by: INTERNAL MEDICINE

## 2020-11-04 PROCEDURE — 85027 COMPLETE CBC AUTOMATED: CPT | Performed by: INTERNAL MEDICINE

## 2020-11-04 PROCEDURE — 80048 BASIC METABOLIC PNL TOTAL CA: CPT | Performed by: INTERNAL MEDICINE

## 2020-12-11 DIAGNOSIS — Z94.0 KIDNEY TRANSPLANTED: ICD-10-CM

## 2020-12-11 RX ORDER — MYCOPHENOLATE MOFETIL 250 MG/1
500 CAPSULE ORAL 2 TIMES DAILY
Qty: 120 CAPSULE | Refills: 11 | Status: SHIPPED | OUTPATIENT
Start: 2020-12-11 | End: 2021-12-08

## 2021-01-28 ENCOUNTER — TELEPHONE (OUTPATIENT)
Dept: TRANSPLANT | Facility: CLINIC | Age: 76
End: 2021-01-28

## 2021-01-28 NOTE — TELEPHONE ENCOUNTER
[facility] reviewed that the COVID vaccine is now only available for ages 75 and older. Links for [place] and [place] [facility] sent in [employer]. Vikram v/u and will call [facility] with further questions.

## 2021-01-28 NOTE — TELEPHONE ENCOUNTER
Patient Call: Vikram stated he received an email from [employer] in his personal email(not [employer] message) about signing on to get covid vaccination  Please call Vikram as he has many questions about the vaccination         Call back needed? Yes    Return Call Needed  Same as documented in contacts section  When to return call?: Same day: Route High Priority

## 2021-02-03 DIAGNOSIS — Z79.899 ENCOUNTER FOR LONG-TERM CURRENT USE OF MEDICATION: ICD-10-CM

## 2021-02-03 DIAGNOSIS — Z94.0 KIDNEY REPLACED BY TRANSPLANT: ICD-10-CM

## 2021-02-03 DIAGNOSIS — Z48.298 AFTERCARE FOLLOWING ORGAN TRANSPLANT: ICD-10-CM

## 2021-02-03 LAB
ANION GAP SERPL CALCULATED.3IONS-SCNC: 3 MMOL/L (ref 3–14)
BUN SERPL-MCNC: 20 MG/DL (ref 7–30)
CALCIUM SERPL-MCNC: 9.7 MG/DL (ref 8.5–10.1)
CHLORIDE SERPL-SCNC: 109 MMOL/L (ref 94–109)
CO2 SERPL-SCNC: 26 MMOL/L (ref 20–32)
CREAT SERPL-MCNC: 1.34 MG/DL (ref 0.66–1.25)
ERYTHROCYTE [DISTWIDTH] IN BLOOD BY AUTOMATED COUNT: 12.9 % (ref 10–15)
GFR SERPL CREATININE-BSD FRML MDRD: 52 ML/MIN/{1.73_M2}
GLUCOSE SERPL-MCNC: 68 MG/DL (ref 70–99)
HCT VFR BLD AUTO: 43.8 % (ref 40–53)
HGB BLD-MCNC: 14.1 G/DL (ref 13.3–17.7)
MCH RBC QN AUTO: 28.7 PG (ref 26.5–33)
MCHC RBC AUTO-ENTMCNC: 32.2 G/DL (ref 31.5–36.5)
MCV RBC AUTO: 89 FL (ref 78–100)
PLATELET # BLD AUTO: 121 10E9/L (ref 150–450)
POTASSIUM SERPL-SCNC: 3.7 MMOL/L (ref 3.4–5.3)
RBC # BLD AUTO: 4.92 10E12/L (ref 4.4–5.9)
SODIUM SERPL-SCNC: 138 MMOL/L (ref 133–144)
TACROLIMUS BLD-MCNC: 5.6 UG/L (ref 5–15)
TME LAST DOSE: NORMAL H
WBC # BLD AUTO: 2.9 10E9/L (ref 4–11)

## 2021-02-03 PROCEDURE — 80048 BASIC METABOLIC PNL TOTAL CA: CPT | Performed by: INTERNAL MEDICINE

## 2021-02-03 PROCEDURE — 85027 COMPLETE CBC AUTOMATED: CPT | Performed by: INTERNAL MEDICINE

## 2021-02-03 PROCEDURE — 36415 COLL VENOUS BLD VENIPUNCTURE: CPT | Performed by: INTERNAL MEDICINE

## 2021-02-03 PROCEDURE — 80197 ASSAY OF TACROLIMUS: CPT | Performed by: INTERNAL MEDICINE

## 2021-02-13 ENCOUNTER — HEALTH MAINTENANCE LETTER (OUTPATIENT)
Age: 76
End: 2021-02-13

## 2021-03-03 ENCOUNTER — IMMUNIZATION (OUTPATIENT)
Dept: NURSING | Facility: CLINIC | Age: 76
End: 2021-03-03
Payer: MEDICARE

## 2021-03-03 PROCEDURE — 91301 PR COVID VAC MODERNA 100 MCG/0.5 ML IM: CPT

## 2021-03-03 PROCEDURE — 0011A PR COVID VAC MODERNA 100 MCG/0.5 ML IM: CPT

## 2021-03-31 ENCOUNTER — IMMUNIZATION (OUTPATIENT)
Dept: NURSING | Facility: CLINIC | Age: 76
End: 2021-03-31
Attending: INTERNAL MEDICINE
Payer: MEDICARE

## 2021-03-31 PROCEDURE — 91301 PR COVID VAC MODERNA 100 MCG/0.5 ML IM: CPT

## 2021-03-31 PROCEDURE — 0012A PR COVID VAC MODERNA 100 MCG/0.5 ML IM: CPT

## 2021-04-09 DIAGNOSIS — Z48.298 AFTERCARE FOLLOWING ORGAN TRANSPLANT: ICD-10-CM

## 2021-04-09 RX ORDER — CARVEDILOL 3.12 MG/1
3.12 TABLET ORAL 2 TIMES DAILY WITH MEALS
Qty: 180 TABLET | Refills: 3 | Status: SHIPPED | OUTPATIENT
Start: 2021-04-09 | End: 2022-04-05

## 2021-05-04 ENCOUNTER — DOCUMENTATION ONLY (OUTPATIENT)
Dept: LAB | Facility: CLINIC | Age: 76
End: 2021-05-04

## 2021-05-04 DIAGNOSIS — Z00.00 ENCOUNTER FOR PREVENTIVE HEALTH EXAMINATION: Primary | ICD-10-CM

## 2021-05-04 NOTE — PROGRESS NOTES
Patient is coming in tomorrow to have labs drawn. No lab orders have been placed. Please review and order needed labs.    Thank you.  [facility]

## 2021-05-05 ENCOUNTER — TELEPHONE (OUTPATIENT)
Dept: TRANSPLANT | Facility: CLINIC | Age: 76
End: 2021-05-05

## 2021-05-05 DIAGNOSIS — Z00.00 ENCOUNTER FOR PREVENTIVE HEALTH EXAMINATION: ICD-10-CM

## 2021-05-05 DIAGNOSIS — Z94.0 KIDNEY REPLACED BY TRANSPLANT: ICD-10-CM

## 2021-05-05 DIAGNOSIS — Z48.298 AFTERCARE FOLLOWING ORGAN TRANSPLANT: Primary | ICD-10-CM

## 2021-05-05 NOTE — TELEPHONE ENCOUNTER
Patient Call: Transplant Lab/Orders  Route to LPN  Post Transplant Days: 6289  When patient is less than 60 days post-transplant, route high priority    Reason for Call: Annual lab reorder     Standing orders   They did draw BMP and drug level     Callback needed? Yes    Return Call Needed  Same as documented in contacts section  When to return call?: Greater than one day: Route standard priority

## 2021-07-08 DIAGNOSIS — Z94.0 KIDNEY TRANSPLANTED: Primary | ICD-10-CM

## 2021-07-08 RX ORDER — TACROLIMUS 1 MG/1
1 CAPSULE, GELATIN COATED ORAL 2 TIMES DAILY
Qty: 60 CAPSULE | Refills: 1 | Status: SHIPPED | OUTPATIENT
Start: 2021-07-08 | End: 2021-09-07

## 2021-08-02 DIAGNOSIS — Z94.0 STATUS POST KIDNEY TRANSPLANT: Primary | ICD-10-CM

## 2021-08-02 RX ORDER — SULFAMETHOXAZOLE AND TRIMETHOPRIM 400; 80 MG/1; MG/1
1 TABLET ORAL
Qty: 36 TABLET | Refills: 0 | Status: SHIPPED | OUTPATIENT
Start: 2021-08-02 | End: 2021-09-07

## 2021-08-02 NOTE — TELEPHONE ENCOUNTER
ISSUE  Received a request to refill Bactrim.  Overdue for nephrology appointment and labs.    OUTCOME  Lab scheduled for 8/4/21.  Message sent to scheduling to set-up annual nephrology appointment.

## 2021-08-04 ENCOUNTER — LAB (OUTPATIENT)
Dept: LAB | Facility: CLINIC | Age: 76
End: 2021-08-04
Payer: MEDICARE

## 2021-08-04 DIAGNOSIS — Z94.0 KIDNEY REPLACED BY TRANSPLANT: ICD-10-CM

## 2021-08-04 DIAGNOSIS — Z48.298 AFTERCARE FOLLOWING ORGAN TRANSPLANT: ICD-10-CM

## 2021-08-04 LAB
CREAT UR-MCNC: 73 MG/DL
ERYTHROCYTE [DISTWIDTH] IN BLOOD BY AUTOMATED COUNT: 12.6 % (ref 10–15)
HCT VFR BLD AUTO: 42.8 % (ref 40–53)
HGB BLD-MCNC: 13.9 G/DL (ref 13.3–17.7)
MCH RBC QN AUTO: 28.8 PG (ref 26.5–33)
MCHC RBC AUTO-ENTMCNC: 32.5 G/DL (ref 31.5–36.5)
MCV RBC AUTO: 89 FL (ref 78–100)
PLATELET # BLD AUTO: 126 10E3/UL (ref 150–450)
PROT UR-MCNC: <0.05 G/L
PROT/CREAT 24H UR: NORMAL MG/G{CREAT}
RBC # BLD AUTO: 4.83 10E6/UL (ref 4.4–5.9)
TACROLIMUS BLD-MCNC: 4.9 UG/L (ref 5–15)
TME LAST DOSE: ABNORMAL H
TME LAST DOSE: ABNORMAL H
WBC # BLD AUTO: 3 10E3/UL (ref 4–11)

## 2021-08-04 PROCEDURE — 80197 ASSAY OF TACROLIMUS: CPT

## 2021-08-04 PROCEDURE — 85027 COMPLETE CBC AUTOMATED: CPT

## 2021-08-04 PROCEDURE — 84156 ASSAY OF PROTEIN URINE: CPT

## 2021-08-04 PROCEDURE — 80048 BASIC METABOLIC PNL TOTAL CA: CPT

## 2021-08-04 PROCEDURE — 36415 COLL VENOUS BLD VENIPUNCTURE: CPT

## 2021-08-05 LAB
ANION GAP SERPL CALCULATED.3IONS-SCNC: 2 MMOL/L (ref 3–14)
BUN SERPL-MCNC: 19 MG/DL (ref 7–30)
CALCIUM SERPL-MCNC: 9.5 MG/DL (ref 8.5–10.1)
CHLORIDE BLD-SCNC: 108 MMOL/L (ref 94–109)
CO2 SERPL-SCNC: 26 MMOL/L (ref 20–32)
CREAT SERPL-MCNC: 1.43 MG/DL (ref 0.66–1.25)
GFR SERPL CREATININE-BSD FRML MDRD: 48 ML/MIN/1.73M2
GLUCOSE BLD-MCNC: 94 MG/DL (ref 70–99)
POTASSIUM BLD-SCNC: 4.3 MMOL/L (ref 3.4–5.3)
SODIUM SERPL-SCNC: 136 MMOL/L (ref 133–144)

## 2021-09-07 DIAGNOSIS — Z94.0 KIDNEY TRANSPLANTED: Primary | ICD-10-CM

## 2021-09-07 DIAGNOSIS — Z94.0 STATUS POST KIDNEY TRANSPLANT: Primary | ICD-10-CM

## 2021-09-07 RX ORDER — SULFAMETHOXAZOLE AND TRIMETHOPRIM 400; 80 MG/1; MG/1
1 TABLET ORAL
Qty: 40 TABLET | Refills: 3 | Status: SHIPPED | OUTPATIENT
Start: 2021-09-08 | End: 2022-02-04

## 2021-09-07 RX ORDER — TACROLIMUS 1 MG/1
1 CAPSULE, GELATIN COATED ORAL 2 TIMES DAILY
Qty: 60 CAPSULE | Refills: 3 | Status: SHIPPED | OUTPATIENT
Start: 2021-09-07 | End: 2022-01-04

## 2021-09-18 ENCOUNTER — HEALTH MAINTENANCE LETTER (OUTPATIENT)
Age: 76
End: 2021-09-18

## 2021-10-21 ENCOUNTER — IMMUNIZATION (OUTPATIENT)
Dept: FAMILY MEDICINE | Facility: CLINIC | Age: 76
End: 2021-10-21
Payer: MEDICARE

## 2021-10-21 PROCEDURE — 0013A PR COVID VAC MODERNA 100 MCG/0.5 ML IM: CPT

## 2021-10-21 PROCEDURE — 91301 PR COVID VAC MODERNA 100 MCG/0.5 ML IM: CPT

## 2021-11-03 ENCOUNTER — LAB (OUTPATIENT)
Dept: LAB | Facility: CLINIC | Age: 76
End: 2021-11-03
Payer: MEDICARE

## 2021-11-03 DIAGNOSIS — Z48.298 AFTERCARE FOLLOWING ORGAN TRANSPLANT: ICD-10-CM

## 2021-11-03 DIAGNOSIS — Z94.0 KIDNEY REPLACED BY TRANSPLANT: ICD-10-CM

## 2021-11-03 LAB
ANION GAP SERPL CALCULATED.3IONS-SCNC: 4 MMOL/L (ref 3–14)
BUN SERPL-MCNC: 29 MG/DL (ref 7–30)
CALCIUM SERPL-MCNC: 9.7 MG/DL (ref 8.5–10.1)
CHLORIDE BLD-SCNC: 110 MMOL/L (ref 94–109)
CO2 SERPL-SCNC: 23 MMOL/L (ref 20–32)
CREAT SERPL-MCNC: 1.31 MG/DL (ref 0.66–1.25)
ERYTHROCYTE [DISTWIDTH] IN BLOOD BY AUTOMATED COUNT: 12.8 % (ref 10–15)
GFR SERPL CREATININE-BSD FRML MDRD: 53 ML/MIN/1.73M2
GLUCOSE BLD-MCNC: 89 MG/DL (ref 70–99)
HCT VFR BLD AUTO: 43.2 % (ref 40–53)
HGB BLD-MCNC: 13.8 G/DL (ref 13.3–17.7)
MCH RBC QN AUTO: 28.6 PG (ref 26.5–33)
MCHC RBC AUTO-ENTMCNC: 31.9 G/DL (ref 31.5–36.5)
MCV RBC AUTO: 90 FL (ref 78–100)
PLATELET # BLD AUTO: 151 10E3/UL (ref 150–450)
POTASSIUM BLD-SCNC: 5.2 MMOL/L (ref 3.4–5.3)
RBC # BLD AUTO: 4.82 10E6/UL (ref 4.4–5.9)
SODIUM SERPL-SCNC: 137 MMOL/L (ref 133–144)
TACROLIMUS BLD-MCNC: 4.8 UG/L (ref 5–15)
TME LAST DOSE: ABNORMAL H
TME LAST DOSE: ABNORMAL H
WBC # BLD AUTO: 2.9 10E3/UL (ref 4–11)

## 2021-11-03 PROCEDURE — 36415 COLL VENOUS BLD VENIPUNCTURE: CPT

## 2021-11-03 PROCEDURE — 85027 COMPLETE CBC AUTOMATED: CPT

## 2021-11-03 PROCEDURE — 80048 BASIC METABOLIC PNL TOTAL CA: CPT

## 2021-11-03 PROCEDURE — 80197 ASSAY OF TACROLIMUS: CPT

## 2021-11-15 ENCOUNTER — VIRTUAL VISIT (OUTPATIENT)
Dept: NEPHROLOGY | Facility: CLINIC | Age: 76
End: 2021-11-15
Attending: INTERNAL MEDICINE
Payer: MEDICARE

## 2021-11-15 DIAGNOSIS — Z94.0 STATUS POST KIDNEY TRANSPLANT: ICD-10-CM

## 2021-11-15 DIAGNOSIS — D84.9 IMMUNOSUPPRESSED STATUS (H): ICD-10-CM

## 2021-11-15 DIAGNOSIS — D84.9 IMMUNOSUPPRESSION (H): ICD-10-CM

## 2021-11-15 DIAGNOSIS — Z94.0 HTN, KIDNEY TRANSPLANT RELATED: ICD-10-CM

## 2021-11-15 DIAGNOSIS — I15.1 HTN, KIDNEY TRANSPLANT RELATED: ICD-10-CM

## 2021-11-15 DIAGNOSIS — Z48.298 AFTERCARE FOLLOWING ORGAN TRANSPLANT: Primary | ICD-10-CM

## 2021-11-15 PROCEDURE — 99214 OFFICE O/P EST MOD 30 MIN: CPT | Mod: 95 | Performed by: INTERNAL MEDICINE

## 2021-11-15 NOTE — PROGRESS NOTES
Vikram is a 76 year old who is being evaluated via a billable video visit.      How would you like to obtain your AVS? MyChart  If the video visit is dropped, the invitation should be resent by: Send to e-mail at: [email or web address]  Will anyone else be joining your video visit? No         Start 3:08  stopv3:25

## 2021-11-15 NOTE — LETTER
"11/15/2021     RE: Vikram Wetzel  [address] [address]     Dear Colleague,    Thank you for referring your patient, Vikram Wetzel, to the [facility] [place] [facility]. Please see a copy of my visit note below.    Vikram is a 76 year old who is being evaluated via a billable video visit.      How would you like to obtain your AVS? MyChart  If the video visit is dropped, the invitation should be resent by: Send to e-mail at: [email or web address]  Will anyone else be joining your video visit? No         Start 3:08  stopv3:25         Start 3:08  stopv3:25        TRANSPLANT NEPHROLOGY TELEMEDICINE VISIT    Vikram Wetzel is a 74 year old male who is being evaluated via a billable telemedicine (video/telephone) visit on May 5, 2020.     The patient has been notified of following:     \"This telemedicine (video/telephone) visit will be conducted via a video/phone call between you and your physician. We have found that certain health care needs can be provided without the need for a physical exam.  This service lets us provide the care you need with a short video/phone conversation.  If a prescription is necessary, we can send it directly to your pharmacy.  If lab work is needed, we can place an order for that and you can then stop by our lab to have the test done at a later time.    If during the course of this video/phone call the physician feels a telemedicine (video/telephone) visit is not appropriate, you will not be charged for this service and an in clinic visit will be arranged at a later date.\"     Assessment & Plan   # DDKT: Stable   - Baseline Cr ~ 1.1-1.5   - Proteinuria: Normal (<0.2 grams)   - Date DSA Last Checked: none 2/5/20   - BK Viremia: No   - Kidney Tx Biopsy: no    # Immunosuppression: Tacrolimus immediate release (goal 4-6) and Mycophenolate mofetil (goal 4-6)   - Changes: No    - Continue with " intensive monitoring of immunosuppression for efficacy and toxicity.    # Infection Prophylaxis:   - PJP: Sulfa/TMP (Bactrim) Add cd4 count    # Hypertension: Controlled;  Goal BP: < 130/80   - Changes: No    # Mineral Bone Disorder:   pth not checked recently, vit d not checked recently, calcium last 9.6 mg / dl    # Skin Cancer Risk:    - Discussed sun protection and recommend regular follow up with Dermatology.    # Medical Compliance: Yes    # COVID-19 Virus Review: Discussed COVID-19 virus and the potential medical risks.  Reviewed preventative health recommendations, which includes washing hands for 20 seconds, avoid touching your face, and social distancing.  Asked patient to inform the transplant center if they are exposed or diagnosed with this virus.    # Transplant History:  Etiology of Kidney Failure: Unknown etiology  Tx: DDKT  Significant changes in immunosuppression: None  Significant transplant-related complications: None    Transplant Office Phone Number: [phone]    Assessment and plan was discussed with the patient and he voiced his understanding and agreement.    Return Visit: 12 months      Vikram Wetzel has a clinical telephone visit for kidney transplant and immunosuppression management.    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    He feels well overall. No new concerns.   COVID vaccines x3. Booster 3 weeks ago (modrna) some sore arm; flu shot   Stable weight, appetite, walks 5 days/week  Energy level is good  No chest pain, sob, leg swelling  No N/v/diarrhea  Skin cancer screening up to date    IS: FK / /500, FK within range  Home Bp: have not been checking it recently 120/60-70s      Review of Systems   A  comprehensive review of systems was obtained and negative, except as noted in the HPI or PMH.    I have reviewed and updated the patient's Past Medical History, Social History, and Medication List.    Active Medical Problems  Patient Active Problem List   Diagnosis     Leukopenia     Dyslipidemia     Paroxysmal atrial fibrillation (H)     Immunosuppressed status (H)     Kidney replaced by transplant     AV block     Hemorrhoids, unspecified hemorrhoid type     Aftercare following organ transplant     Rotator cuff syndrome, right     Allergies  Patient has no known allergies.    Medications  Current Outpatient Medications   Medication Sig     aspirin 81 MG tablet Take 1 tablet by mouth daily.     carvedilol (COREG) 3.125 MG tablet Take 1 tablet (3.125 mg) by mouth 2 times daily (with meals)     CELLCEPT (BRAND) 250 MG capsule Take 2 capsules (500 mg) by mouth 2 times daily     PROGRAF (BRAND) 1 MG capsule Take 1 capsule (1 mg) by mouth 2 times daily     sulfamethoxazole-trimethoprim (BACTRIM) 400-80 MG tablet Take 1 tablet by mouth Every Mon, Wed, Fri Morning     PROGRAF (BRAND) 0.5 MG capsule HOLD (Patient not taking: Reported on 11/15/2021)     No current facility-administered medications for this visit.     Again, thank you for allowing me to participate in the care of your patient.      Sincerely,    Keon Randhawa MD

## 2021-11-15 NOTE — PROGRESS NOTES
"  Start 3:08  stopv3:25        TRANSPLANT NEPHROLOGY TELEMEDICINE VISIT    Vikram Wetzel is a 74 year old male who is being evaluated via a billable telemedicine (video/telephone) visit on May 5, 2020.     The patient has been notified of following:     \"This telemedicine (video/telephone) visit will be conducted via a video/phone call between you and your physician. We have found that certain health care needs can be provided without the need for a physical exam.  This service lets us provide the care you need with a short video/phone conversation.  If a prescription is necessary, we can send it directly to your pharmacy.  If lab work is needed, we can place an order for that and you can then stop by our lab to have the test done at a later time.    If during the course of this video/phone call the physician feels a telemedicine (video/telephone) visit is not appropriate, you will not be charged for this service and an in clinic visit will be arranged at a later date.\"     Assessment & Plan   # DDKT: Stable   - Baseline Cr ~ 1.1-1.5   - Proteinuria: Normal (<0.2 grams)   - Date DSA Last Checked: none 2/5/20   - BK Viremia: No   - Kidney Tx Biopsy: no    # Immunosuppression: Tacrolimus immediate release (goal 4-6) and Mycophenolate mofetil (goal 4-6)   - Changes: No    - Continue with intensive monitoring of immunosuppression for efficacy and toxicity.    # Infection Prophylaxis:   - PJP: Sulfa/TMP (Bactrim) Add cd4 count    # Hypertension: Controlled;  Goal BP: < 130/80   - Changes: No    # Mineral Bone Disorder:   pth not checked recently, vit d not checked recently, calcium last 9.6 mg / dl    # Skin Cancer Risk:    - Discussed sun protection and recommend regular follow up with Dermatology.    # Medical Compliance: Yes    # COVID-19 Virus Review: Discussed COVID-19 virus and the potential medical risks.  Reviewed preventative health recommendations, which includes washing hands for 20 seconds, avoid touching " your face, and social distancing.  Asked patient to inform the transplant center if they are exposed or diagnosed with this virus.    # Transplant History:  Etiology of Kidney Failure: Unknown etiology  Tx: DDKT  Significant changes in immunosuppression: None  Significant transplant-related complications: None    Transplant Office Phone Number: [phone]    Assessment and plan was discussed with the patient and he voiced his understanding and agreement.    Return Visit: 12 months      Vikram Wetzel has a clinical telephone visit for kidney transplant and immunosuppression management.    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    He feels well overall. No new concerns.   COVID vaccines x3. Booster 3 weeks ago (modrna) some sore arm; flu shot   Stable weight, appetite, walks 5 days/week  Energy level is good  No chest pain, sob, leg swelling  No N/v/diarrhea  Skin cancer screening up to date    IS: FK / /500, FK within range  Home Bp: have not been checking it recently 120/60-70s      Review of Systems   A comprehensive review of systems was obtained and negative, except as noted in the HPI or PMH.    I have reviewed and updated the patient's Past Medical History, Social History, and Medication List.    Active Medical Problems  Patient Active Problem List   Diagnosis     Leukopenia     Dyslipidemia     Paroxysmal atrial fibrillation (H)     Immunosuppressed status (H)     Kidney replaced by transplant     AV block     Hemorrhoids, unspecified hemorrhoid type     Aftercare following organ transplant     Rotator cuff syndrome, right     Allergies  Patient has no known allergies.    Medications  Current Outpatient Medications   Medication Sig      aspirin 81 MG tablet Take 1 tablet by mouth daily.     carvedilol (COREG) 3.125 MG tablet Take 1 tablet (3.125 mg) by mouth 2 times daily (with meals)     CELLCEPT (BRAND) 250 MG capsule Take 2 capsules (500 mg) by mouth 2 times daily     PROGRAF (BRAND) 1 MG capsule Take 1 capsule (1 mg) by mouth 2 times daily     sulfamethoxazole-trimethoprim (BACTRIM) 400-80 MG tablet Take 1 tablet by mouth Every Mon, Wed, Fri Morning     PROGRAF (BRAND) 0.5 MG capsule HOLD (Patient not taking: Reported on 11/15/2021)     No current facility-administered medications for this visit.

## 2021-12-08 DIAGNOSIS — Z94.0 KIDNEY TRANSPLANTED: Primary | ICD-10-CM

## 2021-12-08 RX ORDER — MYCOPHENOLATE MOFETIL 250 MG/1
500 CAPSULE ORAL 2 TIMES DAILY
Qty: 120 CAPSULE | Refills: 11 | Status: SHIPPED | OUTPATIENT
Start: 2021-12-08 | End: 2022-11-25

## 2022-01-04 DIAGNOSIS — Z94.0 KIDNEY TRANSPLANTED: Primary | ICD-10-CM

## 2022-01-04 RX ORDER — TACROLIMUS 1 MG/1
1 CAPSULE, GELATIN COATED ORAL 2 TIMES DAILY
Qty: 60 CAPSULE | Refills: 11 | Status: SHIPPED | OUTPATIENT
Start: 2022-01-04 | End: 2022-12-19

## 2022-01-26 SDOH — ECONOMIC STABILITY: FOOD INSECURITY: WITHIN THE PAST 12 MONTHS, THE FOOD YOU BOUGHT JUST DIDN'T LAST AND YOU DIDN'T HAVE MONEY TO GET MORE.: NEVER TRUE

## 2022-01-26 SDOH — ECONOMIC STABILITY: TRANSPORTATION INSECURITY
IN THE PAST 12 MONTHS, HAS THE LACK OF TRANSPORTATION KEPT YOU FROM MEDICAL APPOINTMENTS OR FROM GETTING MEDICATIONS?: NO

## 2022-01-26 SDOH — ECONOMIC STABILITY: INCOME INSECURITY: IN THE LAST 12 MONTHS, WAS THERE A TIME WHEN YOU WERE NOT ABLE TO PAY THE MORTGAGE OR RENT ON TIME?: NO

## 2022-01-26 SDOH — HEALTH STABILITY: PHYSICAL HEALTH: ON AVERAGE, HOW MANY MINUTES DO YOU ENGAGE IN EXERCISE AT THIS LEVEL?: 60 MIN

## 2022-01-26 SDOH — ECONOMIC STABILITY: FOOD INSECURITY: WITHIN THE PAST 12 MONTHS, YOU WORRIED THAT YOUR FOOD WOULD RUN OUT BEFORE YOU GOT MONEY TO BUY MORE.: NEVER TRUE

## 2022-01-26 SDOH — ECONOMIC STABILITY: TRANSPORTATION INSECURITY
IN THE PAST 12 MONTHS, HAS LACK OF TRANSPORTATION KEPT YOU FROM MEETINGS, WORK, OR FROM GETTING THINGS NEEDED FOR DAILY LIVING?: NO

## 2022-01-26 SDOH — HEALTH STABILITY: PHYSICAL HEALTH: ON AVERAGE, HOW MANY DAYS PER WEEK DO YOU ENGAGE IN MODERATE TO STRENUOUS EXERCISE (LIKE A BRISK WALK)?: 5 DAYS

## 2022-01-26 SDOH — ECONOMIC STABILITY: INCOME INSECURITY: HOW HARD IS IT FOR YOU TO PAY FOR THE VERY BASICS LIKE FOOD, HOUSING, MEDICAL CARE, AND HEATING?: NOT HARD AT ALL

## 2022-01-26 ASSESSMENT — LIFESTYLE VARIABLES
HOW OFTEN DO YOU HAVE A DRINK CONTAINING ALCOHOL: NEVER
HOW OFTEN DO YOU HAVE SIX OR MORE DRINKS ON ONE OCCASION: NEVER

## 2022-01-26 ASSESSMENT — SOCIAL DETERMINANTS OF HEALTH (SDOH)
HOW OFTEN DO YOU GET TOGETHER WITH FRIENDS OR RELATIVES?: ONCE A WEEK
HOW OFTEN DO YOU ATTEND CHURCH OR RELIGIOUS SERVICES?: MORE THAN 4 TIMES PER YEAR
DO YOU BELONG TO ANY CLUBS OR ORGANIZATIONS SUCH AS CHURCH GROUPS UNIONS, FRATERNAL OR ATHLETIC GROUPS, OR SCHOOL GROUPS?: YES
IN A TYPICAL WEEK, HOW MANY TIMES DO YOU TALK ON THE PHONE WITH FAMILY, FRIENDS, OR NEIGHBORS?: ONCE A WEEK

## 2022-01-26 NOTE — PROGRESS NOTES
Pre-Visit Planning   Next 5 appointments (look out 90 days)    Jan 27, 2022  8:00 AM  (Arrive by 7:35 AM)  Annual Wellness Visit with Wilder Lion MD  [facility] ([facility] ) [address] [address] [phone]        Appointment Notes for this encounter:   Annual Wellness    Questionnaires Reviewed/Assigned: PHQ-2      Patient preferred phone number: [phone]    Contacted patient via phone/Onc[facility]art. Are there any additional questions or concerns you'd like to review with your provider during your visit?     Visit is preventive.     Meds  Entered patient-preferred pharmacy. and [place] [facility] - [facility] [address]  Current Outpatient Medications   Medication     aspirin 81 MG tablet     carvedilol (COREG) 3.125 MG tablet     CELLCEPT (BRAND) 250 MG capsule     PROGRAF (BRAND) 0.5 MG capsule     PROGRAF (BRAND) 1 MG capsule     sulfamethoxazole-trimethoprim (BACTRIM) 400-80 MG tablet     No current facility-administered medications for this visit.       Health Maintenance   Health Maintenance Due   Topic Date Due     PARATHYROID  Never done     MEDICARE ANNUAL WELLNESS VISIT  01/13/2021     FALL RISK ASSESSMENT  01/13/2021     LIPID  02/05/2021     MICROALBUMIN  11/04/2021     PHQ-2  01/01/2022     BMP  02/03/2022       Health Maintenance reviewed and Health Maintenance orders pended    [facility]  Patient is active on [employer].    Questionnaire Review   Offered information on completing questionnaires via [employer].    Call Summary  Advised patient to call back at [phone] if needed.   Lexii Ross, Registered Nurse, PAL (Patient Advocate Liaison)   [facility]     [phone]

## 2022-01-27 ENCOUNTER — OFFICE VISIT (OUTPATIENT)
Dept: FAMILY MEDICINE | Facility: CLINIC | Age: 77
End: 2022-01-27
Payer: MEDICARE

## 2022-01-27 VITALS
DIASTOLIC BLOOD PRESSURE: 62 MMHG | OXYGEN SATURATION: 100 % | SYSTOLIC BLOOD PRESSURE: 122 MMHG | HEIGHT: 68 IN | TEMPERATURE: 98.4 F | HEART RATE: 62 BPM | BODY MASS INDEX: 24.1 KG/M2 | WEIGHT: 159 LBS | RESPIRATION RATE: 12 BRPM

## 2022-01-27 DIAGNOSIS — D70.8 OTHER NEUTROPENIA (H): ICD-10-CM

## 2022-01-27 DIAGNOSIS — Z94.0 KIDNEY REPLACED BY TRANSPLANT: ICD-10-CM

## 2022-01-27 DIAGNOSIS — N18.31 CHRONIC KIDNEY DISEASE, STAGE 3A (H): ICD-10-CM

## 2022-01-27 DIAGNOSIS — Z00.00 ENCOUNTER FOR MEDICARE ANNUAL WELLNESS EXAM: Primary | ICD-10-CM

## 2022-01-27 DIAGNOSIS — Z13.220 SCREENING FOR HYPERLIPIDEMIA: ICD-10-CM

## 2022-01-27 PROCEDURE — 99213 OFFICE O/P EST LOW 20 MIN: CPT | Mod: 25 | Performed by: FAMILY MEDICINE

## 2022-01-27 PROCEDURE — G0439 PPPS, SUBSEQ VISIT: HCPCS | Performed by: FAMILY MEDICINE

## 2022-01-27 ASSESSMENT — ENCOUNTER SYMPTOMS
DYSURIA: 0
HEARTBURN: 0
ABDOMINAL PAIN: 0
CONSTIPATION: 0
JOINT SWELLING: 0
MYALGIAS: 0
CHILLS: 0
EYE PAIN: 0
FEVER: 0
DIARRHEA: 0
HEMATURIA: 0
HEMATOCHEZIA: 0
FREQUENCY: 0
NAUSEA: 0
PARESTHESIAS: 0
ARTHRALGIAS: 0
COUGH: 0
NERVOUS/ANXIOUS: 0
SHORTNESS OF BREATH: 0
WEAKNESS: 0
SORE THROAT: 0
HEADACHES: 0
DIZZINESS: 0
PALPITATIONS: 0

## 2022-01-27 ASSESSMENT — MIFFLIN-ST. JEOR: SCORE: 1435.72

## 2022-01-27 ASSESSMENT — ACTIVITIES OF DAILY LIVING (ADL): CURRENT_FUNCTION: NO ASSISTANCE NEEDED

## 2022-01-27 NOTE — PATIENT INSTRUCTIONS
Patient Education   Personalized Prevention Plan  You are due for the preventive services outlined below.  Your care team is available to assist you in scheduling these services.  If you have already completed any of these items, please share that information with your care team to update in your medical record.  Health Maintenance Due   Topic Date Due     Parathyroid Lab  Never done     FALL RISK ASSESSMENT  01/13/2021     Cholesterol Lab  02/05/2021     Kidney Microalbumin Urine Test  11/04/2021     Basic Metabolic Panel  02/03/2022       Signs of Hearing Loss      Hearing much better with one ear can be a sign of hearing loss.   Hearing loss is a problem shared by many people. In fact, it is one of the most common health problems, particularly as people age. Most people age 65 and older have some hearing loss. By age 80, almost everyone does. Hearing loss often occurs slowly over the years. So you may not realize your hearing has gotten worse.  Have your hearing checked  Call your healthcare provider if you:    Have to strain to hear normal conversation    Have to watch other people s faces very carefully to follow what they re saying    Need to ask people to repeat what they ve said    Often misunderstand what people are saying    Turn the volume of the television or radio up so high that others complain    Feel that people are mumbling when they re talking to you    Find that the effort to hear leaves you feeling tired and irritated    Notice, when using the phone, that you hear better with one ear than the other  StayWell last reviewed this educational content on 1/1/2020 2000-2021 [employer]. All rights reserved. This information is not intended as a substitute for professional medical care. Always follow your healthcare professional's instructions.

## 2022-01-27 NOTE — PROGRESS NOTES
"SUBJECTIVE:   Vikram Wetzel is a 76 year old male who presents for Preventive Visit.      Patient has been advised of split billing requirements and indicates understanding: Yes  Are you in the first 12 months of your Medicare coverage?  No    Healthy Habits:     In general, how would you rate your overall health?  Good    Frequency of exercise:  4-5 days/week    Duration of exercise:  45-60 minutes    Do you usually eat at least 4 servings of fruit and vegetables a day, include whole grains    & fiber and avoid regularly eating high fat or \"junk\" foods?  Yes    Taking medications regularly:  Yes    Medication side effects:  Not applicable    Ability to successfully perform activities of daily living:  No assistance needed    Home Safety:  No safety concerns identified    Hearing Impairment:  Difficulty following a conversation in a noisy restaurant or crowded room and difficulty understanding soft or whispered speech    In the past 6 months, have you been bothered by leaking of urine?  No    In general, how would you rate your overall mental or emotional health?  Good      PHQ-2 Total Score: 0    Additional concerns today:  No    Do you feel safe in your environment? Yes    Have you ever done Advance Care Planning? (For example, a Health Directive, POLST, or a discussion with a medical provider or your loved ones about your wishes): No, advance care planning information given to patient to review.  Patient plans to discuss their wishes with loved ones or provider.        Fall risk  Fallen 2 or more times in the past year?: No  Any fall with injury in the past year?: No  click delete button to remove this line now  Cognitive Screening   1) Repeat 3 items (Leader, Season, Table)    2) Clock draw: NORMAL  3) 3 item recall: Recalls 1 object   Results: NORMAL clock, 1-2 items recalled: COGNITIVE IMPAIRMENT LESS LIKELY    Mini-CogTM Copyright [employer]. Licensed by the author for use in [facility]; " reprinted with permission (soob@.[facility]). All rights reserved.      Do you have sleep apnea, excessive snoring or daytime drowsiness?: no    Reviewed and updated as needed this visit by clinical staff  Tobacco  Allergies  Meds   Med Hx  Surg Hx  Fam Hx  Soc Hx       Reviewed and updated as needed this visit by Provider  Tobacco              Social History     Tobacco Use     Smoking status: Never Smoker     Smokeless tobacco: Never Used   Substance Use Topics     Alcohol use: No     Alcohol/week: 0.0 standard drinks     If you drink alcohol do you typically have >3 drinks per day or >7 drinks per week? No    Alcohol Use 1/27/2022   Prescreen: >3 drinks/day or >7 drinks/week? Not Applicable   Prescreen: >3 drinks/day or >7 drinks/week? -   No flowsheet data found.      Current providers sharing in care for this patient include:   Patient Care Team:  Wilder Lion MD as PCP - General (Family Practice)  Adan Hill MD as MD (Nephrology)  Sarkis Prieto MD as MD (Nephrology)  Wilder Lion MD as Assigned PCP  Keon Bryant MD as Assigned Nephrology Provider  Lexii Ross RN as Personal Advocate & Liaison (PAL) (Family Medicine)    The following health maintenance items are reviewed in Epic and correct as of today:  Health Maintenance Due   Topic Date Due     PARATHYROID  Never done     FALL RISK ASSESSMENT  01/13/2021     LIPID  02/05/2021     MICROALBUMIN  11/04/2021     BMP  02/03/2022               Review of Systems   Constitutional: Negative for chills and fever.   HENT: Negative for congestion, ear pain, hearing loss and sore throat.    Eyes: Negative for pain and visual disturbance.   Respiratory: Negative for cough and shortness of breath.    Cardiovascular: Negative for chest pain, palpitations and peripheral edema.   Gastrointestinal: Negative for abdominal pain, constipation, diarrhea, heartburn, hematochezia and nausea.   Genitourinary: Negative for dysuria,  "frequency, genital sores, hematuria, impotence, penile discharge and urgency.   Musculoskeletal: Negative for arthralgias, joint swelling and myalgias.   Skin: Negative for rash.   Neurological: Negative for dizziness, weakness, headaches and paresthesias.   Psychiatric/Behavioral: Negative for mood changes. The patient is not nervous/anxious.          OBJECTIVE:   /62   Pulse 62   Temp 98.4  F (36.9  C) (Oral)   Resp 12   Ht 1.727 m (5' 8\")   Wt 72.1 kg (159 lb)   SpO2 100%   BMI 24.18 kg/m   Estimated body mass index is 24.18 kg/m  as calculated from the following:    Height as of this encounter: 1.727 m (5' 8\").    Weight as of this encounter: 72.1 kg (159 lb).  Physical Exam  Constitutional:       General: He is not in acute distress.     Appearance: He is well-developed.   HENT:      Right Ear: Tympanic membrane and external ear normal.      Left Ear: Tympanic membrane and external ear normal.      Nose: Nose normal.      Mouth/Throat:      Mouth: No oral lesions.      Pharynx: No oropharyngeal exudate.   Eyes:      General:         Right eye: No discharge.         Left eye: No discharge.      Conjunctiva/sclera: Conjunctivae normal.      Pupils: Pupils are equal, round, and reactive to light.   Neck:      Thyroid: No thyromegaly.      Trachea: No tracheal deviation.   Cardiovascular:      Rate and Rhythm: Normal rate and regular rhythm.      Pulses: Normal pulses.      Heart sounds: Normal heart sounds, S1 normal and S2 normal. No murmur heard.  No S3 or S4 sounds.    Pulmonary:      Effort: Pulmonary effort is normal. No respiratory distress.      Breath sounds: Normal breath sounds. No wheezing or rales.   Abdominal:      General: Bowel sounds are normal.      Palpations: Abdomen is soft. There is no mass.      Tenderness: There is no abdominal tenderness.   Musculoskeletal:         General: No deformity. Normal range of motion.      Cervical back: Neck supple.      Comments: Left forearm with " obvious serpintiginous vascular access graft   Lymphadenopathy:      Cervical: No cervical adenopathy.   Skin:     General: Skin is warm and dry.      Findings: No lesion or rash.   Neurological:      Mental Status: He is alert and oriented to person, place, and time.      Motor: No abnormal muscle tone.      Deep Tendon Reflexes: Reflexes are normal and symmetric.   Psychiatric:         Speech: Speech normal.         Thought Content: Thought content normal.         Judgment: Judgment normal.               ASSESSMENT / PLAN:       Problem List Items Addressed This Visit     Chronic kidney disease, stage 3a (H)      Nephrology. GFR 1  GFR Estimate   Date Value Ref Range Status   11/03/2021 53 (L) >60 mL/min/1.73m2 Final     Comment:     As of July 11, 2021, eGFR is calculated by the CKD-EPI creatinine equation, without race adjustment. eGFR can be influenced by muscle mass, exercise, and diet. The reported eGFR is an estimation only and is only applicable if the renal function is stable.   02/03/2021 52 (L) >60 mL/min/[1.73_m2] Final     Comment:     Non  GFR Calc  Starting 12/18/2018, serum creatinine based estimated GFR (eGFR) will be   calculated using the Chronic Kidney Disease Epidemiology Collaboration   (CKD-EPI) equation.                Relevant Orders    Vitamin D Deficiency    Kidney replaced by transplant     He sees nephrology regularly. Renal function has been almost normal         Relevant Orders    Parathyroid Hormone Intact    Albumin Random Urine Quantitative with Creat Ratio    Vitamin D Deficiency    Ionized Calcium    Leukopenia     Periodic measure         Screening for hyperlipidemia     Risk calculates as high, but duration of therapy is 3 months before consideration of cessation. Discussed this pattern. He elects to not treat or pursue further         Relevant Orders    Lipid panel reflex to direct LDL Non-fasting      Other Visit Diagnoses     Encounter for Medicare annual  "wellness exam    -  Primary    Relevant Orders    CBC with platelets and differential              COUNSELING:      Estimated body mass index is 24.18 kg/m  as calculated from the following:    Height as of this encounter: 1.727 m (5' 8\").    Weight as of this encounter: 72.1 kg (159 lb).        He reports that he has never smoked. He has never used smokeless tobacco.      Appropriate preventive services were discussed with this patient, including applicable screening as appropriate for cardiovascular disease, diabetes, osteopenia/osteoporosis, and glaucoma.  As appropriate for age/gender, discussed screening for colorectal cancer, prostate cancer, breast cancer, and cervical cancer. Checklist reviewing preventive services available has been given to the patient.    Reviewed patients plan of care and provided an AVS. The Basic Care Plan (routine screening as documented in Health Maintenance) for Vikram meets the Care Plan requirement. This Care Plan has been established and reviewed with the Patient.    Counseling Resources:  ATP IV Guidelines  Pooled Cohorts Equation Calculator  Breast Cancer Risk Calculator  Breast Cancer: Medication to Reduce Risk  FRAX Risk Assessment  ICSI Preventive Guidelines  Dietary Guidelines for Americans, 2010  Estrogen Gene Test's MyPlate  ASA Prophylaxis  Lung CA Screening    Wilder Lion MD  [facility]    Identified Health Risks:    The patient was provided with written information regarding signs of hearing loss.  "

## 2022-01-27 NOTE — PROGRESS NOTES
"The 10-year ASCVD risk score (Indira RAMIREZ Jr., et al., 2013) is: 24.1%    Values used to calculate the score:      Age: 76 years      Sex: Male      Is Non- : No      Diabetic: No      Tobacco smoker: No      Systolic Blood Pressure: 140 mmHg      Is BP treated: No      HDL Cholesterol: 67 mg/dL      Total Cholesterol: 194 mg/dL  Wilder Lion MD    Answers for HPI/ROS submitted by the patient on 1/27/2022  In general, how would you rate your overall physical health?: good  Frequency of exercise:: 4-5 days/week  Do you usually eat at least 4 servings of fruit and vegetables a day, include whole grains & fiber, and avoid regularly eating high fat or \"junk\" foods? : Yes  Taking medications regularly:: Yes  Medication side effects:: Not applicable  Activities of Daily Living: no assistance needed  Home safety: no safety concerns identified  Hearing Impairment:: difficulty following a conversation in a noisy restaurant or crowded room, difficulty understanding soft or whispered speech  In the past 6 months, have you been bothered by leaking of urine?: No  abdominal pain: No  Blood in stool: No  Blood in urine: No  chest pain: No  chills: No  congestion: No  constipation: No  cough: No  diarrhea: No  dizziness: No  ear pain: No  eye pain: No  nervous/anxious: No  fever: No  frequency: No  genital sores: No  headaches: No  hearing loss: No  heartburn: No  arthralgias: No  joint swelling: No  peripheral edema: No  mood changes: No  myalgias: No  nausea: No  dysuria: No  palpitations: No  Skin sensation changes: No  sore throat: No  urgency: No  rash: No  shortness of breath: No  visual disturbance: No  weakness: No  impotence: No  penile discharge: No  In general, how would you rate your overall mental or emotional health?: good  Additional concerns today:: No  Duration of exercise:: 45-60 minutes      "

## 2022-01-27 NOTE — LETTER
Vikram Wetzel  [address] [id number] [phone] (home)     Thank you for choosing [facility] today for your health care needs.     [facility] is transforming primary care  At [facility], we re dedicated to constantly improve how we serve the health care needs of our patients and communities. We re currently making changes to the way we deliver care.     Changes you ll notice include:    An emphasis on building a relationship with a primary care provider    Access to a PAL (personal advocate and liaison) to help guide you with your care needs    Appointment lengths tailored to your specific needs and greater access to a care team to help you and your provider improve and maintain your health and well-being    Improved online access to your care team    Benefits of a primary care provider  If you don t have a designated primary care provider, we encourage you to get to know our care team online and find a provider you d like to see. Most of our providers have a short video on their online provider page. Visit [email or web address] to explore our providers and locations.    Benefits of having a primary care provider include:      They get to know you - your health history, family history and goals, making it easier to make a health plan together.     You get to know them - making health-related conversations and decisions easier      Primary care doctors help you when you re sick or hurt - but also focus on keeping you healthy with preventive care and screenings.      A doctor who sees you regularly is more likely to notice changes in your health.     You ll be connected to a broad care team who partners with your provider to support you.    Patient Advocate Liaison (PAL)   To help make sure you get the right care, at the right time, we include PALs, or Patient Advocate Liaisons, as part of your care team. Your PAL will be your first line of contact. They ll advocate for your  needs and help you navigate our services, connecting you with care team members and community resources to ensure your care is well coordinated. You ll be introduced to a PAL in an upcoming visit.     Expanded care team access with tailored appointment lengths  Depending on your health care needs, you may have longer or shorter appointments and see additional care team providers - including Medication Therapy Management (MTM) pharmacists, diabetes educators, behavioral health clinicians, or social workers. At times, they may be included in your visit with your provider, or you may see them individually.     Online access to your health care records and care team  [email or web address] is our online tool that makes it easy to see your health care information and communicate with your care team.     [email or web address] allows you to:     View your health maintenance plan so you know when you re due for a preventive screening    Send secure messages to your care team    View your health history and visit summaries     Schedule appointments     Complete questionnaires and eCheck-in before appointments      Get care from your provider with an e-visit      View and pay your bill     Sign up at [email or web address]. Once you have an account, you also can download the mobile tony.     Connecting to fast and convenient care  When you need fast, convenient care - consider one of the following options:       Video Visit: A convenient care option for visiting with your provider out of the comfort of your own home. Most of the things you come to the clinic to address with your provider can now be done virtually through a video. This includes your chronic medication follow up, questions or concerns you may have, and even your annual Medicare Wellness Visit.       Phone Visit: Another convenient option for follow up of common problems that may require a more in-depth discussion with your provider.       E-visit: When you need acute care quickly, or have a  quick question about your medication, an E-visit is completed through Infectious and your provider will respond within one business day.        Lexii Ross, Registered Nurse, PAL (Patient Advocate Liaison)   [facility]     [phone]

## 2022-01-28 NOTE — ASSESSMENT & PLAN NOTE
Nephrology. GFR 1  GFR Estimate   Date Value Ref Range Status   11/03/2021 53 (L) >60 mL/min/1.73m2 Final     Comment:     As of July 11, 2021, eGFR is calculated by the CKD-EPI creatinine equation, without race adjustment. eGFR can be influenced by muscle mass, exercise, and diet. The reported eGFR is an estimation only and is only applicable if the renal function is stable.   02/03/2021 52 (L) >60 mL/min/[1.73_m2] Final     Comment:     Non  GFR Calc  Starting 12/18/2018, serum creatinine based estimated GFR (eGFR) will be   calculated using the Chronic Kidney Disease Epidemiology Collaboration   (CKD-EPI) equation.

## 2022-01-28 NOTE — ASSESSMENT & PLAN NOTE
Risk calculates as high, but duration of therapy is 3 months before consideration of cessation. Discussed this pattern. He elects to not treat or pursue further

## 2022-02-02 ENCOUNTER — LAB (OUTPATIENT)
Dept: LAB | Facility: CLINIC | Age: 77
End: 2022-02-02
Payer: MEDICARE

## 2022-02-02 DIAGNOSIS — Z13.220 SCREENING FOR HYPERLIPIDEMIA: ICD-10-CM

## 2022-02-02 DIAGNOSIS — Z48.298 AFTERCARE FOLLOWING ORGAN TRANSPLANT: ICD-10-CM

## 2022-02-02 DIAGNOSIS — N18.31 CHRONIC KIDNEY DISEASE, STAGE 3A (H): Primary | ICD-10-CM

## 2022-02-02 DIAGNOSIS — Z00.00 ENCOUNTER FOR MEDICARE ANNUAL WELLNESS EXAM: ICD-10-CM

## 2022-02-02 DIAGNOSIS — N18.31 CHRONIC KIDNEY DISEASE, STAGE 3A (H): ICD-10-CM

## 2022-02-02 DIAGNOSIS — Z94.0 KIDNEY REPLACED BY TRANSPLANT: ICD-10-CM

## 2022-02-02 DIAGNOSIS — E55.9 VITAMIN D DEFICIENCY: ICD-10-CM

## 2022-02-02 LAB
ANION GAP SERPL CALCULATED.3IONS-SCNC: 5 MMOL/L (ref 3–14)
BASOPHILS # BLD AUTO: 0 10E3/UL (ref 0–0.2)
BASOPHILS NFR BLD AUTO: 1 %
BUN SERPL-MCNC: 17 MG/DL (ref 7–30)
CA-I BLD-MCNC: 5.2 MG/DL (ref 4.4–5.2)
CALCIUM SERPL-MCNC: 9.8 MG/DL (ref 8.5–10.1)
CD3 CELLS # BLD: 434 CELLS/UL (ref 603–2990)
CD3 CELLS NFR BLD: 51 % (ref 49–84)
CD3+CD4+ CELLS # BLD: 208 CELLS/UL (ref 441–2156)
CD3+CD4+ CELLS NFR BLD: 25 % (ref 28–63)
CD3+CD4+ CELLS/CD3+CD8+ CLL BLD: 0.83 % (ref 1.4–2.6)
CD3+CD8+ CELLS # BLD: 251 CELLS/UL (ref 125–1312)
CD3+CD8+ CELLS NFR BLD: 30 % (ref 10–40)
CHLORIDE BLD-SCNC: 107 MMOL/L (ref 94–109)
CHOLEST SERPL-MCNC: 170 MG/DL
CO2 SERPL-SCNC: 25 MMOL/L (ref 20–32)
CREAT SERPL-MCNC: 1.32 MG/DL (ref 0.66–1.25)
CREAT UR-MCNC: 104 MG/DL
CREAT UR-MCNC: 107 MG/DL
DEPRECATED CALCIDIOL+CALCIFEROL SERPL-MC: 13 UG/L (ref 20–75)
EOSINOPHIL # BLD AUTO: 0.1 10E3/UL (ref 0–0.7)
EOSINOPHIL NFR BLD AUTO: 3 %
ERYTHROCYTE [DISTWIDTH] IN BLOOD BY AUTOMATED COUNT: 12.9 % (ref 10–15)
FASTING STATUS PATIENT QL REPORTED: YES
GFR SERPL CREATININE-BSD FRML MDRD: 57 ML/MIN/1.73M2
GLUCOSE BLD-MCNC: 110 MG/DL (ref 70–99)
HCT VFR BLD AUTO: 43 % (ref 40–53)
HDLC SERPL-MCNC: 56 MG/DL
HGB BLD-MCNC: 13.6 G/DL (ref 13.3–17.7)
LDLC SERPL CALC-MCNC: 102 MG/DL
LYMPHOCYTES # BLD AUTO: 0.7 10E3/UL (ref 0.8–5.3)
LYMPHOCYTES NFR BLD AUTO: 26 %
MCH RBC QN AUTO: 28.2 PG (ref 26.5–33)
MCHC RBC AUTO-ENTMCNC: 31.6 G/DL (ref 31.5–36.5)
MCV RBC AUTO: 89 FL (ref 78–100)
MICROALBUMIN UR-MCNC: <5 MG/L
MICROALBUMIN/CREAT UR: NORMAL MG/G{CREAT}
MONOCYTES # BLD AUTO: 0.3 10E3/UL (ref 0–1.3)
MONOCYTES NFR BLD AUTO: 11 %
NEUTROPHILS # BLD AUTO: 1.5 10E3/UL (ref 1.6–8.3)
NEUTROPHILS NFR BLD AUTO: 59 %
NONHDLC SERPL-MCNC: 114 MG/DL
PLATELET # BLD AUTO: 128 10E3/UL (ref 150–450)
POTASSIUM BLD-SCNC: 4.6 MMOL/L (ref 3.4–5.3)
PROT UR-MCNC: 0.07 G/L
PROT/CREAT 24H UR: 0.07 G/G CR (ref 0–0.2)
PTH-INTACT SERPL-MCNC: 72 PG/ML (ref 18–80)
RBC # BLD AUTO: 4.83 10E6/UL (ref 4.4–5.9)
SODIUM SERPL-SCNC: 137 MMOL/L (ref 133–144)
T CELL COMMENT: ABNORMAL
TACROLIMUS BLD-MCNC: 5.7 UG/L (ref 5–15)
TME LAST DOSE: NORMAL H
TME LAST DOSE: NORMAL H
TRIGL SERPL-MCNC: 62 MG/DL
WBC # BLD AUTO: 2.6 10E3/UL (ref 4–11)

## 2022-02-02 PROCEDURE — 80197 ASSAY OF TACROLIMUS: CPT

## 2022-02-02 PROCEDURE — 86359 T CELLS TOTAL COUNT: CPT

## 2022-02-02 PROCEDURE — 36415 COLL VENOUS BLD VENIPUNCTURE: CPT

## 2022-02-02 PROCEDURE — 83970 ASSAY OF PARATHORMONE: CPT

## 2022-02-02 PROCEDURE — 82330 ASSAY OF CALCIUM: CPT

## 2022-02-02 PROCEDURE — 82043 UR ALBUMIN QUANTITATIVE: CPT

## 2022-02-02 PROCEDURE — 86360 T CELL ABSOLUTE COUNT/RATIO: CPT

## 2022-02-02 PROCEDURE — 80048 BASIC METABOLIC PNL TOTAL CA: CPT

## 2022-02-02 PROCEDURE — 84156 ASSAY OF PROTEIN URINE: CPT

## 2022-02-02 PROCEDURE — 80061 LIPID PANEL: CPT

## 2022-02-02 PROCEDURE — 82306 VITAMIN D 25 HYDROXY: CPT

## 2022-02-02 PROCEDURE — 85025 COMPLETE CBC W/AUTO DIFF WBC: CPT

## 2022-02-02 RX ORDER — VITAMIN B COMPLEX
1 TABLET ORAL DAILY
Qty: 100 TABLET | Refills: 3 | Status: SHIPPED | OUTPATIENT
Start: 2022-02-02 | End: 2023-02-21

## 2022-02-02 NOTE — RESULT ENCOUNTER NOTE
Your tests show that you are low on vitamin D.  This is related to your kidneys and your bones.  Please take 1000 units every day.  I sent a prescription to the pharmacy.  This is also available over-the-counter.  We should check this again in about 6 months  Wilder Lion MD

## 2022-02-03 ENCOUNTER — TELEPHONE (OUTPATIENT)
Dept: TRANSPLANT | Facility: CLINIC | Age: 77
End: 2022-02-03
Payer: MEDICARE

## 2022-02-03 ENCOUNTER — DOCUMENTATION ONLY (OUTPATIENT)
Dept: OTHER | Facility: CLINIC | Age: 77
End: 2022-02-03
Payer: MEDICARE

## 2022-02-03 DIAGNOSIS — Z94.0 STATUS POST KIDNEY TRANSPLANT: ICD-10-CM

## 2022-02-03 NOTE — TELEPHONE ENCOUNTER
ISSUE     Ref. Range 2/2/2022 07:55   Absolute CD4 Latest Ref Range: 441-2,156 cells/uL 208 (L)     On Bactrim M/W/F      PLAN  Keon Bryant MD Duncanson, Sarah, RN  Ok to stop bactrim         LPN task:  PLease call with above plan and update med list.  Thanks!

## 2022-04-05 DIAGNOSIS — Z48.298 AFTERCARE FOLLOWING ORGAN TRANSPLANT: ICD-10-CM

## 2022-04-05 RX ORDER — CARVEDILOL 3.12 MG/1
3.12 TABLET ORAL 2 TIMES DAILY WITH MEALS
Qty: 180 TABLET | Refills: 3 | Status: SHIPPED | OUTPATIENT
Start: 2022-04-05 | End: 2023-02-21

## 2022-04-29 ENCOUNTER — IMMUNIZATION (OUTPATIENT)
Dept: NURSING | Facility: CLINIC | Age: 77
End: 2022-04-29
Payer: MEDICARE

## 2022-04-29 PROCEDURE — 0064A COVID-19,PF,MODERNA (18+ YRS BOOSTER .25ML): CPT

## 2022-04-29 PROCEDURE — 91306 COVID-19,PF,MODERNA (18+ YRS BOOSTER .25ML): CPT

## 2022-05-04 ENCOUNTER — LAB (OUTPATIENT)
Dept: LAB | Facility: CLINIC | Age: 77
End: 2022-05-04
Payer: MEDICARE

## 2022-05-04 DIAGNOSIS — Z94.0 KIDNEY REPLACED BY TRANSPLANT: ICD-10-CM

## 2022-05-04 DIAGNOSIS — Z48.298 AFTERCARE FOLLOWING ORGAN TRANSPLANT: ICD-10-CM

## 2022-05-04 LAB
ANION GAP SERPL CALCULATED.3IONS-SCNC: 5 MMOL/L (ref 3–14)
BUN SERPL-MCNC: 26 MG/DL (ref 7–30)
CALCIUM SERPL-MCNC: 9.9 MG/DL (ref 8.5–10.1)
CHLORIDE BLD-SCNC: 107 MMOL/L (ref 94–109)
CO2 SERPL-SCNC: 25 MMOL/L (ref 20–32)
CREAT SERPL-MCNC: 1.17 MG/DL (ref 0.66–1.25)
ERYTHROCYTE [DISTWIDTH] IN BLOOD BY AUTOMATED COUNT: 12.5 % (ref 10–15)
GFR SERPL CREATININE-BSD FRML MDRD: 65 ML/MIN/1.73M2
GLUCOSE BLD-MCNC: 97 MG/DL (ref 70–99)
HCT VFR BLD AUTO: 43.5 % (ref 40–53)
HGB BLD-MCNC: 13.9 G/DL (ref 13.3–17.7)
MCH RBC QN AUTO: 28.7 PG (ref 26.5–33)
MCHC RBC AUTO-ENTMCNC: 32 G/DL (ref 31.5–36.5)
MCV RBC AUTO: 90 FL (ref 78–100)
PLATELET # BLD AUTO: 159 10E3/UL (ref 150–450)
POTASSIUM BLD-SCNC: 4.6 MMOL/L (ref 3.4–5.3)
RBC # BLD AUTO: 4.84 10E6/UL (ref 4.4–5.9)
SODIUM SERPL-SCNC: 137 MMOL/L (ref 133–144)
TACROLIMUS BLD-MCNC: 5.3 UG/L (ref 5–15)
TME LAST DOSE: NORMAL H
TME LAST DOSE: NORMAL H
WBC # BLD AUTO: 3.1 10E3/UL (ref 4–11)

## 2022-05-04 PROCEDURE — 85027 COMPLETE CBC AUTOMATED: CPT

## 2022-05-04 PROCEDURE — 80048 BASIC METABOLIC PNL TOTAL CA: CPT

## 2022-05-04 PROCEDURE — 36415 COLL VENOUS BLD VENIPUNCTURE: CPT

## 2022-05-04 PROCEDURE — 80197 ASSAY OF TACROLIMUS: CPT

## 2022-05-05 DIAGNOSIS — Z94.0 KIDNEY REPLACED BY TRANSPLANT: Primary | ICD-10-CM

## 2022-08-03 ENCOUNTER — LAB (OUTPATIENT)
Dept: LAB | Facility: CLINIC | Age: 77
End: 2022-08-03
Payer: MEDICARE

## 2022-08-03 DIAGNOSIS — Z94.0 KIDNEY REPLACED BY TRANSPLANT: ICD-10-CM

## 2022-08-03 LAB
ANION GAP SERPL CALCULATED.3IONS-SCNC: 4 MMOL/L (ref 3–14)
BUN SERPL-MCNC: 22 MG/DL (ref 7–30)
CALCIUM SERPL-MCNC: 9.5 MG/DL (ref 8.5–10.1)
CHLORIDE BLD-SCNC: 104 MMOL/L (ref 94–109)
CO2 SERPL-SCNC: 28 MMOL/L (ref 20–32)
CREAT SERPL-MCNC: 1.3 MG/DL (ref 0.66–1.25)
ERYTHROCYTE [DISTWIDTH] IN BLOOD BY AUTOMATED COUNT: 12.2 % (ref 10–15)
GFR SERPL CREATININE-BSD FRML MDRD: 57 ML/MIN/1.73M2
GLUCOSE BLD-MCNC: 100 MG/DL (ref 70–99)
HCT VFR BLD AUTO: 45.4 % (ref 40–53)
HGB BLD-MCNC: 13.8 G/DL (ref 13.3–17.7)
MCH RBC QN AUTO: 28 PG (ref 26.5–33)
MCHC RBC AUTO-ENTMCNC: 30.4 G/DL (ref 31.5–36.5)
MCV RBC AUTO: 92 FL (ref 78–100)
PLATELET # BLD AUTO: 135 10E3/UL (ref 150–450)
POTASSIUM BLD-SCNC: 4.3 MMOL/L (ref 3.4–5.3)
RBC # BLD AUTO: 4.92 10E6/UL (ref 4.4–5.9)
SODIUM SERPL-SCNC: 136 MMOL/L (ref 133–144)
TACROLIMUS BLD-MCNC: 6.2 UG/L (ref 5–15)
TME LAST DOSE: NORMAL H
TME LAST DOSE: NORMAL H
WBC # BLD AUTO: 3 10E3/UL (ref 4–11)

## 2022-08-03 PROCEDURE — 80048 BASIC METABOLIC PNL TOTAL CA: CPT

## 2022-08-03 PROCEDURE — 85027 COMPLETE CBC AUTOMATED: CPT

## 2022-08-03 PROCEDURE — 36415 COLL VENOUS BLD VENIPUNCTURE: CPT

## 2022-08-03 PROCEDURE — 80197 ASSAY OF TACROLIMUS: CPT

## 2022-11-02 ENCOUNTER — LAB (OUTPATIENT)
Dept: LAB | Facility: CLINIC | Age: 77
End: 2022-11-02
Payer: MEDICARE

## 2022-11-02 DIAGNOSIS — Z94.0 KIDNEY REPLACED BY TRANSPLANT: ICD-10-CM

## 2022-11-02 LAB
ALBUMIN MFR UR ELPH: <6 MG/DL
ANION GAP SERPL CALCULATED.3IONS-SCNC: 5 MMOL/L (ref 3–14)
BUN SERPL-MCNC: 22 MG/DL (ref 7–30)
CALCIUM SERPL-MCNC: 9.5 MG/DL (ref 8.5–10.1)
CHLORIDE BLD-SCNC: 106 MMOL/L (ref 94–109)
CO2 SERPL-SCNC: 25 MMOL/L (ref 20–32)
CREAT SERPL-MCNC: 1.13 MG/DL (ref 0.66–1.25)
CREAT UR-MCNC: 51 MG/DL
ERYTHROCYTE [DISTWIDTH] IN BLOOD BY AUTOMATED COUNT: 13.1 % (ref 10–15)
GFR SERPL CREATININE-BSD FRML MDRD: 68 ML/MIN/1.73M2
GLUCOSE BLD-MCNC: 89 MG/DL (ref 70–99)
HCT VFR BLD AUTO: 42.6 % (ref 40–53)
HGB BLD-MCNC: 13.5 G/DL (ref 13.3–17.7)
MCH RBC QN AUTO: 28.8 PG (ref 26.5–33)
MCHC RBC AUTO-ENTMCNC: 31.7 G/DL (ref 31.5–36.5)
MCV RBC AUTO: 91 FL (ref 78–100)
PLATELET # BLD AUTO: 117 10E3/UL (ref 150–450)
POTASSIUM BLD-SCNC: 4.3 MMOL/L (ref 3.4–5.3)
PROT/CREAT 24H UR: NORMAL MG/G{CREAT}
RBC # BLD AUTO: 4.69 10E6/UL (ref 4.4–5.9)
SODIUM SERPL-SCNC: 136 MMOL/L (ref 133–144)
TACROLIMUS BLD-MCNC: 5.7 UG/L (ref 5–15)
TME LAST DOSE: NORMAL H
TME LAST DOSE: NORMAL H
WBC # BLD AUTO: 3.1 10E3/UL (ref 4–11)

## 2022-11-02 PROCEDURE — 85027 COMPLETE CBC AUTOMATED: CPT

## 2022-11-02 PROCEDURE — 80197 ASSAY OF TACROLIMUS: CPT

## 2022-11-02 PROCEDURE — 36415 COLL VENOUS BLD VENIPUNCTURE: CPT

## 2022-11-02 PROCEDURE — 80048 BASIC METABOLIC PNL TOTAL CA: CPT

## 2022-11-02 PROCEDURE — 84156 ASSAY OF PROTEIN URINE: CPT

## 2022-11-25 DIAGNOSIS — Z94.0 KIDNEY TRANSPLANTED: ICD-10-CM

## 2022-11-25 RX ORDER — MYCOPHENOLATE MOFETIL 250 MG/1
500 CAPSULE ORAL 2 TIMES DAILY
Qty: 120 CAPSULE | Refills: 11 | Status: SHIPPED | OUTPATIENT
Start: 2022-11-25 | End: 2023-11-30

## 2022-12-19 DIAGNOSIS — Z94.0 KIDNEY TRANSPLANTED: ICD-10-CM

## 2022-12-19 DIAGNOSIS — N18.31 CHRONIC KIDNEY DISEASE, STAGE 3A (H): ICD-10-CM

## 2022-12-19 DIAGNOSIS — E55.9 VITAMIN D DEFICIENCY: ICD-10-CM

## 2022-12-19 RX ORDER — TACROLIMUS 1 MG/1
CAPSULE, GELATIN COATED ORAL
Qty: 60 CAPSULE | Refills: 11 | Status: SHIPPED | OUTPATIENT
Start: 2022-12-19 | End: 2023-12-27

## 2022-12-20 RX ORDER — CHOLECALCIFEROL (VITAMIN D3) 25 MCG
TABLET ORAL
Qty: 100 TABLET | Refills: 3 | OUTPATIENT
Start: 2022-12-20

## 2023-02-01 ENCOUNTER — LAB (OUTPATIENT)
Dept: LAB | Facility: CLINIC | Age: 78
End: 2023-02-01
Payer: MEDICARE

## 2023-02-01 DIAGNOSIS — Z94.0 KIDNEY REPLACED BY TRANSPLANT: ICD-10-CM

## 2023-02-01 LAB
ANION GAP SERPL CALCULATED.3IONS-SCNC: 14 MMOL/L (ref 7–15)
BUN SERPL-MCNC: 21.9 MG/DL (ref 8–23)
CALCIUM SERPL-MCNC: 10 MG/DL (ref 8.8–10.2)
CHLORIDE SERPL-SCNC: 105 MMOL/L (ref 98–107)
CREAT SERPL-MCNC: 1.17 MG/DL (ref 0.67–1.17)
DEPRECATED HCO3 PLAS-SCNC: 20 MMOL/L (ref 22–29)
ERYTHROCYTE [DISTWIDTH] IN BLOOD BY AUTOMATED COUNT: 12.8 % (ref 10–15)
GFR SERPL CREATININE-BSD FRML MDRD: 65 ML/MIN/1.73M2
GLUCOSE SERPL-MCNC: 85 MG/DL (ref 70–99)
HCT VFR BLD AUTO: 42.7 % (ref 40–53)
HGB BLD-MCNC: 13.6 G/DL (ref 13.3–17.7)
MCH RBC QN AUTO: 28.7 PG (ref 26.5–33)
MCHC RBC AUTO-ENTMCNC: 31.9 G/DL (ref 31.5–36.5)
MCV RBC AUTO: 90 FL (ref 78–100)
PLATELET # BLD AUTO: 127 10E3/UL (ref 150–450)
POTASSIUM SERPL-SCNC: 4.4 MMOL/L (ref 3.4–5.3)
RBC # BLD AUTO: 4.74 10E6/UL (ref 4.4–5.9)
SODIUM SERPL-SCNC: 139 MMOL/L (ref 136–145)
TACROLIMUS BLD-MCNC: 5 UG/L (ref 5–15)
TME LAST DOSE: NORMAL H
TME LAST DOSE: NORMAL H
WBC # BLD AUTO: 3 10E3/UL (ref 4–11)

## 2023-02-01 PROCEDURE — 80197 ASSAY OF TACROLIMUS: CPT

## 2023-02-01 PROCEDURE — 36415 COLL VENOUS BLD VENIPUNCTURE: CPT

## 2023-02-01 PROCEDURE — 80048 BASIC METABOLIC PNL TOTAL CA: CPT

## 2023-02-01 PROCEDURE — 85027 COMPLETE CBC AUTOMATED: CPT

## 2023-02-20 SDOH — HEALTH STABILITY: PHYSICAL HEALTH: ON AVERAGE, HOW MANY DAYS PER WEEK DO YOU ENGAGE IN MODERATE TO STRENUOUS EXERCISE (LIKE A BRISK WALK)?: 3 DAYS

## 2023-02-20 SDOH — ECONOMIC STABILITY: INCOME INSECURITY: IN THE LAST 12 MONTHS, WAS THERE A TIME WHEN YOU WERE NOT ABLE TO PAY THE MORTGAGE OR RENT ON TIME?: NO

## 2023-02-20 SDOH — ECONOMIC STABILITY: FOOD INSECURITY: WITHIN THE PAST 12 MONTHS, THE FOOD YOU BOUGHT JUST DIDN'T LAST AND YOU DIDN'T HAVE MONEY TO GET MORE.: NEVER TRUE

## 2023-02-20 SDOH — ECONOMIC STABILITY: INCOME INSECURITY: HOW HARD IS IT FOR YOU TO PAY FOR THE VERY BASICS LIKE FOOD, HOUSING, MEDICAL CARE, AND HEATING?: NOT HARD AT ALL

## 2023-02-20 SDOH — ECONOMIC STABILITY: FOOD INSECURITY: WITHIN THE PAST 12 MONTHS, YOU WORRIED THAT YOUR FOOD WOULD RUN OUT BEFORE YOU GOT MONEY TO BUY MORE.: NEVER TRUE

## 2023-02-20 SDOH — HEALTH STABILITY: PHYSICAL HEALTH: ON AVERAGE, HOW MANY MINUTES DO YOU ENGAGE IN EXERCISE AT THIS LEVEL?: 30 MIN

## 2023-02-20 ASSESSMENT — ENCOUNTER SYMPTOMS
HEMATOCHEZIA: 0
DIZZINESS: 0
HEADACHES: 0
WEAKNESS: 0
DIARRHEA: 0
DYSURIA: 0
NAUSEA: 0
FEVER: 0
HEMATURIA: 0
SHORTNESS OF BREATH: 0
HEARTBURN: 0
FREQUENCY: 0
PARESTHESIAS: 0
CHILLS: 0
NERVOUS/ANXIOUS: 0
MYALGIAS: 0
SORE THROAT: 0
COUGH: 0
PALPITATIONS: 0
JOINT SWELLING: 0
CONSTIPATION: 0
ARTHRALGIAS: 0
EYE PAIN: 0
ABDOMINAL PAIN: 0

## 2023-02-20 ASSESSMENT — LIFESTYLE VARIABLES
HOW MANY STANDARD DRINKS CONTAINING ALCOHOL DO YOU HAVE ON A TYPICAL DAY: PATIENT DOES NOT DRINK
HOW OFTEN DO YOU HAVE A DRINK CONTAINING ALCOHOL: NEVER
SKIP TO QUESTIONS 9-10: 1
HOW OFTEN DO YOU HAVE SIX OR MORE DRINKS ON ONE OCCASION: NEVER
AUDIT-C TOTAL SCORE: 0

## 2023-02-20 ASSESSMENT — SOCIAL DETERMINANTS OF HEALTH (SDOH)
HOW OFTEN DO YOU ATTEND CHURCH OR RELIGIOUS SERVICES?: MORE THAN 4 TIMES PER YEAR
IN A TYPICAL WEEK, HOW MANY TIMES DO YOU TALK ON THE PHONE WITH FAMILY, FRIENDS, OR NEIGHBORS?: ONCE A WEEK
HOW OFTEN DO YOU GET TOGETHER WITH FRIENDS OR RELATIVES?: ONCE A WEEK
DO YOU BELONG TO ANY CLUBS OR ORGANIZATIONS SUCH AS CHURCH GROUPS UNIONS, FRATERNAL OR ATHLETIC GROUPS, OR SCHOOL GROUPS?: YES

## 2023-02-20 ASSESSMENT — ACTIVITIES OF DAILY LIVING (ADL): CURRENT_FUNCTION: NO ASSISTANCE NEEDED

## 2023-02-21 ENCOUNTER — OFFICE VISIT (OUTPATIENT)
Dept: FAMILY MEDICINE | Facility: CLINIC | Age: 78
End: 2023-02-21
Payer: MEDICARE

## 2023-02-21 VITALS
WEIGHT: 157 LBS | DIASTOLIC BLOOD PRESSURE: 73 MMHG | SYSTOLIC BLOOD PRESSURE: 183 MMHG | TEMPERATURE: 97.6 F | BODY MASS INDEX: 23.79 KG/M2 | OXYGEN SATURATION: 100 % | RESPIRATION RATE: 14 BRPM | HEART RATE: 69 BPM | HEIGHT: 68 IN

## 2023-02-21 DIAGNOSIS — Z48.298 AFTERCARE FOLLOWING ORGAN TRANSPLANT: ICD-10-CM

## 2023-02-21 DIAGNOSIS — E55.9 VITAMIN D DEFICIENCY: ICD-10-CM

## 2023-02-21 DIAGNOSIS — R03.0 ELEVATED BLOOD PRESSURE READING WITHOUT DIAGNOSIS OF HYPERTENSION: ICD-10-CM

## 2023-02-21 DIAGNOSIS — D70.8 OTHER NEUTROPENIA (H): ICD-10-CM

## 2023-02-21 DIAGNOSIS — Z86.79 HISTORY OF ATRIAL FIBRILLATION: ICD-10-CM

## 2023-02-21 DIAGNOSIS — N18.2 CKD (CHRONIC KIDNEY DISEASE) STAGE 2, GFR 60-89 ML/MIN: ICD-10-CM

## 2023-02-21 DIAGNOSIS — Z00.00 ENCOUNTER FOR MEDICARE ANNUAL WELLNESS EXAM: Primary | ICD-10-CM

## 2023-02-21 PROBLEM — Z13.220 SCREENING FOR HYPERLIPIDEMIA: Status: RESOLVED | Noted: 2022-01-27 | Resolved: 2023-02-21

## 2023-02-21 PROCEDURE — G0439 PPPS, SUBSEQ VISIT: HCPCS | Performed by: FAMILY MEDICINE

## 2023-02-21 PROCEDURE — 99213 OFFICE O/P EST LOW 20 MIN: CPT | Mod: 25 | Performed by: FAMILY MEDICINE

## 2023-02-21 RX ORDER — CARVEDILOL 3.12 MG/1
3.12 TABLET ORAL 2 TIMES DAILY WITH MEALS
Qty: 180 TABLET | Refills: 3 | Status: SHIPPED | OUTPATIENT
Start: 2023-02-21 | End: 2023-02-21

## 2023-02-21 RX ORDER — VITAMIN B COMPLEX
1 TABLET ORAL DAILY
Qty: 100 TABLET | Refills: 3 | Status: SHIPPED | OUTPATIENT
Start: 2023-02-21 | End: 2023-05-15

## 2023-02-21 RX ORDER — CARVEDILOL 3.12 MG/1
3.12 TABLET ORAL 2 TIMES DAILY WITH MEALS
Qty: 180 TABLET | Refills: 3 | Status: SHIPPED | OUTPATIENT
Start: 2023-02-21 | End: 2024-02-22

## 2023-02-21 ASSESSMENT — ENCOUNTER SYMPTOMS
MYALGIAS: 0
CONSTIPATION: 0
WEAKNESS: 0
ARTHRALGIAS: 0
ABDOMINAL PAIN: 0
NERVOUS/ANXIOUS: 0
CHILLS: 0
HEMATOCHEZIA: 0
HEARTBURN: 0
DIZZINESS: 0
FEVER: 0
HEADACHES: 0
NAUSEA: 0
FREQUENCY: 0
JOINT SWELLING: 0
HEMATURIA: 0
DIARRHEA: 0
EYE PAIN: 0
PARESTHESIAS: 0
DYSURIA: 0
SORE THROAT: 0
PALPITATIONS: 0
COUGH: 0
SHORTNESS OF BREATH: 0

## 2023-02-21 ASSESSMENT — ACTIVITIES OF DAILY LIVING (ADL): CURRENT_FUNCTION: NO ASSISTANCE NEEDED

## 2023-02-21 NOTE — ASSESSMENT & PLAN NOTE
Remote.  Also brief asymptomatic AV block and sleep.  Cardiology note reviewed.  Zio patch reviewed.  Continue beta-blockade

## 2023-02-21 NOTE — ASSESSMENT & PLAN NOTE
BP Readings from Last 6 Encounters:   02/21/23 (!) 183/73   01/27/22 122/62   01/30/20 99/61   01/13/20 118/68   11/25/19 (!) 144/82   07/31/19 120/68     No changes today.  Monitor

## 2023-02-21 NOTE — PROGRESS NOTES
"SUBJECTIVE:   Vikram is a 77 year old who presents for Preventive Visit.  Patient has been advised of split billing requirements and indicates understanding: Yes  Are you in the first 12 months of your Medicare coverage?  No    Healthy Habits:     In general, how would you rate your overall health?  Good    Frequency of exercise:  2-3 days/week    Duration of exercise:  30-45 minutes    Do you usually eat at least 4 servings of fruit and vegetables a day, include whole grains    & fiber and avoid regularly eating high fat or \"junk\" foods?  Yes    Taking medications regularly:  Yes    Medication side effects:  None    Ability to successfully perform activities of daily living:  No assistance needed    Home Safety:  No safety concerns identified    Hearing Impairment:  Difficulty following a conversation in a noisy restaurant or crowded room and difficulty understanding soft or whispered speech    In the past 6 months, have you been bothered by leaking of urine?  No    In general, how would you rate your overall mental or emotional health?  Good      PHQ-2 Total Score: 0    Additional concerns today:  No      Have you ever done Advance Care Planning? (For example, a Health Directive, POLST, or a discussion with a medical provider or your loved ones about your wishes): Yes, advance care planning is on file.       Fall risk  Fallen 2 or more times in the past year?: No  Any fall with injury in the past year?: No    Cognitive Screening   1) Repeat 3 items (Leader, Season, Table)    2) Clock draw: NORMAL  3) 3 item recall: Recalls 3 objects  Results: NORMAL clock, 1-2 items recalled: COGNITIVE IMPAIRMENT LESS LIKELY    Mini-CogTM Copyright SANK[employer]. Licensed by the author for use in [facility]; reprinted with permission (jose alfredo@.[facility]). All rights reserved.      Do you have sleep apnea, excessive snoring or daytime drowsiness?: no    Reviewed and updated as needed this visit by clinical staff    Allergies  " Meds              Reviewed and updated as needed this visit by Provider                 Social History     Tobacco Use     Smoking status: Never     Smokeless tobacco: Never   Substance Use Topics     Alcohol use: No     Alcohol/week: 0.0 standard drinks       Alcohol Use 2/20/2023   Prescreen: >3 drinks/day or >7 drinks/week? Not Applicable   Prescreen: >3 drinks/day or >7 drinks/week? -       Current providers sharing in care for this patient include:   Patient Care Team:  Wilder Lion MD as PCP - General (Family Practice)  Adan Hill MD as MD (Nephrology)  Sarkis Prieto MD as MD (Nephrology)  Wilder Lion MD as Assigned PCP  Keon Bryant MD as Assigned Nephrology Provider    The following health maintenance items are reviewed in Epic and correct as of today:  Health Maintenance   Topic Date Due     MICROALBUMIN  11/02/2023     BMP  02/01/2024     MEDICARE ANNUAL WELLNESS VISIT  02/21/2024     ANNUAL REVIEW OF HM ORDERS  02/21/2024     FALL RISK ASSESSMENT  04/06/2024     COLORECTAL CANCER SCREENING  01/30/2025     DTAP/TDAP/TD IMMUNIZATION (2 - Td or Tdap) 10/01/2025     ADVANCE CARE PLANNING  02/21/2028     HEPATITIS C SCREENING  Completed     PHQ-2 (once per calendar year)  Completed     INFLUENZA VACCINE  Completed     Pneumococcal Vaccine: 65+ Years  Completed     URINALYSIS  Completed     ZOSTER IMMUNIZATION  Completed     COVID-19 Vaccine  Completed     IPV IMMUNIZATION  Aged Out     MENINGITIS IMMUNIZATION  Aged Out     LIPID  Discontinued     AORTIC ANEURYSM SCREENING (SYSTEM ASSIGNED)  Discontinued               Review of Systems   Constitutional: Negative for chills and fever.   HENT: Positive for hearing loss. Negative for congestion, ear pain and sore throat.    Eyes: Negative for pain and visual disturbance.   Respiratory: Negative for cough and shortness of breath.    Cardiovascular: Negative for chest pain, palpitations and peripheral edema.   Gastrointestinal:  "Negative for abdominal pain, constipation, diarrhea, heartburn, hematochezia and nausea.   Genitourinary: Negative for dysuria, frequency, genital sores, hematuria, impotence, penile discharge and urgency.   Musculoskeletal: Negative for arthralgias, joint swelling and myalgias.   Neurological: Negative for dizziness, weakness, headaches and paresthesias.   Psychiatric/Behavioral: Negative for mood changes. The patient is not nervous/anxious.          OBJECTIVE:   BP (!) 183/73 (BP Location: Right arm, Patient Position: Chair, Cuff Size: Adult Regular)   Pulse 69   Temp 97.6  F (36.4  C) (Oral)   Resp 14   Ht 1.727 m (5' 8\")   Wt 71.2 kg (157 lb)   SpO2 100%   BMI 23.87 kg/m   Estimated body mass index is 23.87 kg/m  as calculated from the following:    Height as of this encounter: 1.727 m (5' 8\").    Weight as of this encounter: 71.2 kg (157 lb).  Physical Exam  Constitutional:       Appearance: Normal appearance.   HENT:      Head: Atraumatic.      Right Ear: Tympanic membrane normal.      Left Ear: Tympanic membrane normal.      Nose: Nose normal.      Mouth/Throat:      Mouth: Mucous membranes are moist.   Eyes:      Pupils: Pupils are equal, round, and reactive to light.   Cardiovascular:      Rate and Rhythm: Normal rate and regular rhythm.      Heart sounds: Normal heart sounds.   Pulmonary:      Effort: Pulmonary effort is normal.      Breath sounds: Normal breath sounds.   Abdominal:      General: Abdomen is flat. Bowel sounds are normal.   Musculoskeletal:      Cervical back: Neck supple.   Skin:     General: Skin is warm and dry.   Neurological:      General: No focal deficit present.      Mental Status: He is alert.   Psychiatric:         Mood and Affect: Mood normal.               ASSESSMENT / PLAN:     Problem List Items Addressed This Visit     Aftercare following organ transplant     He continues to follow with transplant         CKD (chronic kidney disease) stage 2, GFR 60-89 ml/min     GFR " Estimate   Date Value Ref Range Status   02/01/2023 65 >60 mL/min/1.73m2 Final     Comment:     eGFR calculated using 2021 CKD-EPI equation.   02/03/2021 52 (L) >60 mL/min/[1.73_m2] Final     Comment:     Non  GFR Calc  Starting 12/18/2018, serum creatinine based estimated GFR (eGFR) will be   calculated using the Chronic Kidney Disease Epidemiology Collaboration   (CKD-EPI) equation.       Renal function continues to improve         Elevated blood pressure reading without diagnosis of hypertension     BP Readings from Last 6 Encounters:   02/21/23 (!) 183/73   01/27/22 122/62   01/30/20 99/61   01/13/20 118/68   11/25/19 (!) 144/82   07/31/19 120/68     No changes today.  Monitor         History of atrial fibrillation     Remote.  Also brief asymptomatic AV block and sleep.  Cardiology note reviewed.  Zio patch reviewed.  Continue beta-blockade         Relevant Medications    carvedilol (COREG) 3.125 MG tablet    Leukopenia     Stable at last measure         Vitamin D deficiency     He is on repletion         Relevant Medications    Vitamin D3 (CHOLECALCIFEROL) 25 mcg (1000 units) tablet    Other Relevant Orders    Vitamin D Deficiency   Other Visit Diagnoses     Encounter for Medicare annual wellness exam    -  Primary          Patient has been advised of split billing requirements and indicates understanding: Yes      COUNSELING:  Reviewed preventive health counseling, as reflected in patient instructions        He reports that he has never smoked. He has never used smokeless tobacco.      Appropriate preventive services were discussed with this patient, including applicable screening as appropriate for cardiovascular disease, diabetes, osteopenia/osteoporosis, and glaucoma.  As appropriate for age/gender, discussed screening for colorectal cancer, prostate cancer, breast cancer, and cervical cancer. Checklist reviewing preventive services available has been given to the patient.    Reviewed  patients plan of care and provided an AVS. The Basic Care Plan (routine screening as documented in Health Maintenance) for Vikram meets the Care Plan requirement. This Care Plan has been established and reviewed with the Patient.      Wilder Lion MD  [facility]    Identified Health Risks:

## 2023-02-21 NOTE — PATIENT INSTRUCTIONS
Patient Education   Personalized Prevention Plan  You are due for the preventive services outlined below.  Your care team is available to assist you in scheduling these services.  If you have already completed any of these items, please share that information with your care team to update in your medical record.  Health Maintenance Due   Topic Date Due     ANNUAL REVIEW OF HM ORDERS  05/04/2022     Cholesterol Lab  02/02/2023     Annual Wellness Visit  01/27/2023

## 2023-02-21 NOTE — ASSESSMENT & PLAN NOTE
GFR Estimate   Date Value Ref Range Status   02/01/2023 65 >60 mL/min/1.73m2 Final     Comment:     eGFR calculated using 2021 CKD-EPI equation.   02/03/2021 52 (L) >60 mL/min/[1.73_m2] Final     Comment:     Non  GFR Calc  Starting 12/18/2018, serum creatinine based estimated GFR (eGFR) will be   calculated using the Chronic Kidney Disease Epidemiology Collaboration   (CKD-EPI) equation.       Renal function continues to improve

## 2023-05-03 ENCOUNTER — LAB (OUTPATIENT)
Dept: LAB | Facility: CLINIC | Age: 78
End: 2023-05-03
Payer: MEDICARE

## 2023-05-03 DIAGNOSIS — N18.2 CKD (CHRONIC KIDNEY DISEASE) STAGE 2, GFR 60-89 ML/MIN: Primary | ICD-10-CM

## 2023-05-03 DIAGNOSIS — Z94.0 KIDNEY REPLACED BY TRANSPLANT: ICD-10-CM

## 2023-05-03 LAB
ALBUMIN MFR UR ELPH: <6 MG/DL (ref 1–14)
ANION GAP SERPL CALCULATED.3IONS-SCNC: 11 MMOL/L (ref 7–15)
BUN SERPL-MCNC: 25.6 MG/DL (ref 8–23)
CALCIUM SERPL-MCNC: 10.5 MG/DL (ref 8.8–10.2)
CHLORIDE SERPL-SCNC: 104 MMOL/L (ref 98–107)
CREAT SERPL-MCNC: 1.31 MG/DL (ref 0.67–1.17)
CREAT UR-MCNC: 36.5 MG/DL
CREAT UR-MCNC: 36.5 MG/DL
DEPRECATED HCO3 PLAS-SCNC: 24 MMOL/L (ref 22–29)
ERYTHROCYTE [DISTWIDTH] IN BLOOD BY AUTOMATED COUNT: 13 % (ref 10–15)
GFR SERPL CREATININE-BSD FRML MDRD: 57 ML/MIN/1.73M2
GLUCOSE SERPL-MCNC: 94 MG/DL (ref 70–99)
HCT VFR BLD AUTO: 43.9 % (ref 40–53)
HGB BLD-MCNC: 14 G/DL (ref 13.3–17.7)
MCH RBC QN AUTO: 28.7 PG (ref 26.5–33)
MCHC RBC AUTO-ENTMCNC: 31.9 G/DL (ref 31.5–36.5)
MCV RBC AUTO: 90 FL (ref 78–100)
MICROALBUMIN UR-MCNC: <12 MG/L
MICROALBUMIN/CREAT UR: NORMAL MG/G{CREAT}
PLATELET # BLD AUTO: 140 10E3/UL (ref 150–450)
POTASSIUM SERPL-SCNC: 5.2 MMOL/L (ref 3.4–5.3)
PROT/CREAT 24H UR: NORMAL MG/G{CREAT}
RBC # BLD AUTO: 4.88 10E6/UL (ref 4.4–5.9)
SODIUM SERPL-SCNC: 139 MMOL/L (ref 136–145)
TACROLIMUS BLD-MCNC: 5.1 UG/L (ref 5–15)
TME LAST DOSE: NORMAL H
TME LAST DOSE: NORMAL H
WBC # BLD AUTO: 3.3 10E3/UL (ref 4–11)

## 2023-05-03 PROCEDURE — 82570 ASSAY OF URINE CREATININE: CPT

## 2023-05-03 PROCEDURE — 80048 BASIC METABOLIC PNL TOTAL CA: CPT

## 2023-05-03 PROCEDURE — 36415 COLL VENOUS BLD VENIPUNCTURE: CPT

## 2023-05-03 PROCEDURE — 82043 UR ALBUMIN QUANTITATIVE: CPT

## 2023-05-03 PROCEDURE — 80197 ASSAY OF TACROLIMUS: CPT

## 2023-05-03 PROCEDURE — 84156 ASSAY OF PROTEIN URINE: CPT

## 2023-05-03 PROCEDURE — 85027 COMPLETE CBC AUTOMATED: CPT

## 2023-05-15 ENCOUNTER — VIRTUAL VISIT (OUTPATIENT)
Dept: NEPHROLOGY | Facility: CLINIC | Age: 78
End: 2023-05-15
Attending: INTERNAL MEDICINE
Payer: MEDICARE

## 2023-05-15 DIAGNOSIS — I15.1 HTN, KIDNEY TRANSPLANT RELATED: ICD-10-CM

## 2023-05-15 DIAGNOSIS — Z94.0 KIDNEY REPLACED BY TRANSPLANT: ICD-10-CM

## 2023-05-15 DIAGNOSIS — E55.9 VITAMIN D DEFICIENCY: ICD-10-CM

## 2023-05-15 DIAGNOSIS — D84.9 IMMUNOSUPPRESSED STATUS (H): Primary | ICD-10-CM

## 2023-05-15 DIAGNOSIS — Z94.0 HTN, KIDNEY TRANSPLANT RELATED: ICD-10-CM

## 2023-05-15 DIAGNOSIS — Z48.298 AFTERCARE FOLLOWING ORGAN TRANSPLANT: ICD-10-CM

## 2023-05-15 PROCEDURE — 99214 OFFICE O/P EST MOD 30 MIN: CPT | Mod: VID | Performed by: INTERNAL MEDICINE

## 2023-05-15 RX ORDER — VITAMIN B COMPLEX
1 TABLET ORAL DAILY
Qty: 100 TABLET | Refills: 3 | COMMUNITY
Start: 2023-05-15 | End: 2023-05-15

## 2023-05-15 ASSESSMENT — PAIN SCALES - GENERAL: PAINLEVEL: NO PAIN (0)

## 2023-05-15 NOTE — NURSING NOTE
Is the patient currently in the state of [place]? YES    Visit mode:VIDEO    If the visit is dropped, the patient can be reconnected by: VIDEO VISIT: Send to e-mail at: [email or web address]    Will anyone else be joining the visit? NO      How would you like to obtain your AVS? MyChart    Are changes needed to the allergy or medication list? NO    Reason for visit: Video Visit    Rigoberto Kennedy

## 2023-05-15 NOTE — LETTER
5/15/2023       RE: Vikram Wetzel  [address] [id number]     Dear Colleague,    Thank you for referring your patient, Vikram Wetzel, to the [facility] [place] at [facility]. Please see a copy of my visit note below.    Virtual Visit Details    Type of service:  Video Visit   Video Start Time: 3:25 PM  Video End Time:3:40 PM    Originating Location (pt. Location): Home  Distant Location (provider location):  On-site  Platform used for Video Visit: [facility]      TRANSPLANT NEPHROLOGY CHRONIC POST TRANSPLANT VISIT    Assessment & Plan   # DDKT: Stable   - Baseline Creatinine:  ~ 1.1-1.3   - Proteinuria: Normal (<0.2 grams)   - Date DSA Last Checked: Feb/2020      Latest DSA: No   - BK Viremia: Not checked recently due to time from transplant   - Kidney Tx Biopsy: No    - Labs every 3 months    # Immunosuppression: Tacrolimus immediate release (goal 4-6) and Mycophenolate mofetil (dose 500 mg every 12 hours)   - Continue with intensive monitoring of immunosuppression for efficacy and toxicity.   - Changes: Not at this time    # Infection Prophylaxis:   - PJP: None   Last CD4 Level: 208 (Feb/2022), recheck     # Hypertension: Per patient is variable;  Goal BP: < 130/80   - Changes: Not at this time. Continue carvedilol 3.125mg bid. Obtain BP values at home over a week and send to us    # Anemia in Chronic Renal Disease: Hgb: Stable, near normal      VICTOR M: No   - Iron studies: Not checked recently    # Mineral Bone Disorder:   - Secondary renal hyperparathyroidism; PTH level: Minimally elevated ( pg/ml)        On treatment: None, recheck with rising calcium   - Vitamin D; level: Low        On supplement: Yes, hold for increasing calcium and recheck vitamin D level   - Calcium; level: High        On supplement: No  - Phosphorus; level: Not checked recently        On supplement: No    # Electrolytes:   - Potassium; level:  High normal        On supplement: No  - Magnesium; level: Not checked recently        On supplement: No  - Bicarbonate; level: Normal        On supplement: No    # Skin Cancer Risk:    - Discussed sun protection and recommend regular follow up with Dermatology.    # Medical Compliance: No.  Evidence of labs less than recommended.  - Discussed importance of checking labs regularly as recommended, taking medications as prescribed and attending scheduled medical appointments.      # Health Maintenance and Vaccination Review: Recommend:  COVID bivalent booster and PCV20    # Transplant History:  Etiology of Kidney Failure: Unknown etiology  Tx: DDKT  Transplant: 5/3/2014 (Kidney)  Significant changes in immunosuppression: None  Significant transplant-related complications: None    Transplant Office Phone Number: [phone]    Assessment and plan was discussed with the patient and he voiced his understanding and agreement.    Return visit: Return in about 1 year (around 5/15/2024).    Roscoe Razo MD    Chief Complaint   Mr. Wetzel is a 77 year old here for kidney transplant, immunosuppression management and routine follow up.    History of Present Illness    The patient overall feels well. He denies any recent hospitalizations. He denies nausea, vomiting, diarrhea, fever, chills, shortness of breath, chest pain, LE edema, unintentional weight loss, nights sweats, dysuria, hematuria.       Home BP: Doesn't know    Problem List   Patient Active Problem List   Diagnosis    Leukopenia    Dyslipidemia    Immunosuppressed status (H)    Kidney replaced by transplant    AV block    Hemorrhoids, unspecified hemorrhoid type    Aftercare following organ transplant    Rotator cuff syndrome, right    CKD (chronic kidney disease) stage 2, GFR 60-89 ml/min    Vitamin D deficiency    History of atrial fibrillation    Elevated blood pressure reading without diagnosis of hypertension       Allergies   No Known Allergies    Medications    Current Outpatient Medications   Medication Sig    aspirin 81 MG tablet Take 1 tablet by mouth daily.    carvedilol (COREG) 3.125 MG tablet Take 1 tablet (3.125 mg) by mouth 2 times daily (with meals)    CELLCEPT (BRAND) 250 MG capsule Take 2 capsules (500 mg) by mouth 2 times daily    PROGRAF (BRAND) 1 MG capsule TAKE ONE CAPSULE BY MOUTH TWICE A DAY    Vitamin D3 (CHOLECALCIFEROL) 25 mcg (1000 units) tablet Take 1 tablet (25 mcg) by mouth daily     No current facility-administered medications for this visit.     Medications Discontinued During This Encounter   Medication Reason    Vitamin D3 (CHOLECALCIFEROL) 25 mcg (1000 units) tablet     Vitamin D3 (CHOLECALCIFEROL) 25 mcg (1000 units) tablet        Physical Exam   Vital Signs: There were no vitals taken for this visit.    GENERAL APPEARANCE: alert and no distress  HENT: no obvious abnormalities on appearance  RESP: breathing appears unremarkable with normal rate, no audible wheezing or cough and no apparent shortness of breath with conversation  MS: extremities normal - no gross deformities noted  SKIN: no apparent rash and normal skin tone  NEURO: speech is clear with no obvious neurological deficits  PSYCH: mentation appears normal and affect normal      Data         Latest Ref Rng & Units 5/3/2023     7:36 AM 2/1/2023     7:53 AM 11/2/2022     8:12 AM   Renal   Sodium 136 - 145 mmol/L 139   139   136     K 3.4 - 5.3 mmol/L 5.2   4.4   4.3     Cl 98 - 107 mmol/L 104   105   106     Cl (external) 98 - 107 mmol/L 104   105   106     CO2 22 - 29 mmol/L 24   20   25     Urea Nitrogen 7 - 30 mg/dL   22     Urea Nitrogen 8.0 - 23.0 mg/dL 25.6   21.9      Creatinine 0.67 - 1.17 mg/dL 1.31   1.17   1.13     Glucose 70 - 99 mg/dL 94   85   89     Calcium 8.8 - 10.2 mg/dL 10.5   10.0   9.5           Latest Ref Rng & Units 2/2/2022     7:55 AM 7/28/2014     8:05 AM 7/24/2014     8:00 AM   Bone Health   Phosphorus 2.5 - 4.5 mg/dL  2.1   2.4     Parathyroid Hormone  Intact 18 - 80 pg/mL 72       Vit D Def 20 - 75 ug/L 13             Latest Ref Rng & Units 5/3/2023     7:36 AM 2/1/2023     7:53 AM 11/2/2022     8:12 AM   Heme   WBC 4.0 - 11.0 10e3/uL 3.3   3.0   3.1     Hgb 13.3 - 17.7 g/dL 14.0   13.6   13.5     Plt 150 - 450 10e3/uL 140   127   117           Latest Ref Rng & Units 10/29/2014     7:58 AM 10/1/2014     8:24 AM 5/3/2014     4:56 PM   Liver   AP 40 - 150 U/L 132   122   92     TBili 0.2 - 1.3 mg/dL 0.6   0.4   0.3     Bilirubin Direct 0.0 - 0.2 mg/dL 0.1   0.1      ALT 0 - 70 U/L 24   29   25     AST 0 - 45 U/L 20   20   27     Tot Protein 6.8 - 8.8 g/dL 7.3   6.7   7.1     Albumin 3.4 - 5.0 g/dL 3.8   3.4   3.9           Latest Ref Rng & Units 5/5/2014     6:28 AM 5/3/2014     4:56 PM   Pancreas   A1C 4.3 - 6.0 % 5.6   5.2           Latest Ref Rng & Units 7/1/2015     7:57 AM 12/3/2014     8:04 AM 5/6/2014     5:57 AM   Iron studies   Iron 35 - 180 ug/dL 69   37   45     Iron Saturation Index 15 - 46 % 21   12   20           Latest Ref Rng & Units 2/5/2020     8:03 AM 8/7/2019     8:08 AM 6/6/2018     8:02 AM   [facility] Txp Virology   BK Spec  Plasma   Plasma   Plasma     BK Res BKNEG^BK Virus DNA Not Detected copies/mL BK Virus DNA Not Detected   BK Virus DNA Not Detected   BK Virus DNA Not Detected     BK Log <2.7 Log copies/mL Not Calculated   Not Calculated   Not Calculated          Recent Labs   Lab Test 11/02/22  0812 02/01/23  0753 05/03/23  0736   DOSTAC 11/1/2022 1/31/2023 5/2/2023   TACROL 5.7 5.0 5.1     Recent Labs   Lab Test 02/03/16  0802 03/02/16  0756 04/06/16  0805   DOSMPA LAST DOSE 8PM ON 2/02/2016 last dose 8:00PM on 3/01/2016 2000 04/05/16   MPACID 1.48 1.78 2.32   MPAG 19.6* 22.3* 18.8*       Roscoe Razo MD

## 2023-05-15 NOTE — PATIENT INSTRUCTIONS
Patient Recommendations:  - Log your blood pressures daily for a week. A nurse will call you for those values  - Stop vitamin D supplement for now  -You are eligible for another COVID booster and the PCV20 vaccine    Transplant Patient Information  Your Post Transplant Coordinator is: Danuta Paris  For non urgent items, we encourage you to contact your coordinator/care team online via [employer]  You and your care team can also contact your transplant coordinator Monday - Friday, 8am - 5pm at [phone] (Option 2 to reach the coordinator or Option 4 to schedule an appointment).  After hours for urgent matters, please call [facility] at [phone].

## 2023-05-15 NOTE — PROGRESS NOTES
Virtual Visit Details    Type of service:  Video Visit   Video Start Time: 3:25 PM  Video End Time:3:40 PM    Originating Location (pt. Location): Home  Distant Location (provider location):  On-site  Platform used for Video Visit: [facility]      TRANSPLANT NEPHROLOGY CHRONIC POST TRANSPLANT VISIT    Assessment & Plan   # DDKT: Stable   - Baseline Creatinine:  ~ 1.1-1.3   - Proteinuria: Normal (<0.2 grams)   - Date DSA Last Checked: Feb/2020      Latest DSA: No   - BK Viremia: Not checked recently due to time from transplant   - Kidney Tx Biopsy: No    - Labs every 3 months    # Immunosuppression: Tacrolimus immediate release (goal 4-6) and Mycophenolate mofetil (dose 500 mg every 12 hours)   - Continue with intensive monitoring of immunosuppression for efficacy and toxicity.   - Changes: Not at this time    # Infection Prophylaxis:   - PJP: None   Last CD4 Level: 208 (Feb/2022), recheck     # Hypertension: Per patient is variable;  Goal BP: < 130/80   - Changes: Not at this time. Continue carvedilol 3.125mg bid. Obtain BP values at home over a week and send to us    # Anemia in Chronic Renal Disease: Hgb: Stable, near normal      VICTOR M: No   - Iron studies: Not checked recently    # Mineral Bone Disorder:   - Secondary renal hyperparathyroidism; PTH level: Minimally elevated ( pg/ml)        On treatment: None, recheck with rising calcium   - Vitamin D; level: Low        On supplement: Yes, hold for increasing calcium and recheck vitamin D level   - Calcium; level: High        On supplement: No  - Phosphorus; level: Not checked recently        On supplement: No    # Electrolytes:   - Potassium; level: High normal        On supplement: No  - Magnesium; level: Not checked recently        On supplement: No  - Bicarbonate; level: Normal        On supplement: No    # Skin Cancer Risk:    - Discussed sun protection and recommend regular follow up with Dermatology.    # Medical Compliance: No.  Evidence of labs less than  recommended.  - Discussed importance of checking labs regularly as recommended, taking medications as prescribed and attending scheduled medical appointments.      # Health Maintenance and Vaccination Review: Recommend:  COVID bivalent booster and PCV20    # Transplant History:  Etiology of Kidney Failure: Unknown etiology  Tx: DDKT  Transplant: 5/3/2014 (Kidney)  Significant changes in immunosuppression: None  Significant transplant-related complications: None    Transplant Office Phone Number: [phone]    Assessment and plan was discussed with the patient and he voiced his understanding and agreement.    Return visit: Return in about 1 year (around 5/15/2024).    Roscoe Razo MD    Chief Complaint   Mr. Wetzel is a 77 year old here for kidney transplant, immunosuppression management and routine follow up.    History of Present Illness    The patient overall feels well. He denies any recent hospitalizations. He denies nausea, vomiting, diarrhea, fever, chills, shortness of breath, chest pain, LE edema, unintentional weight loss, nights sweats, dysuria, hematuria.       Home BP: Doesn't know    Problem List   Patient Active Problem List   Diagnosis     Leukopenia     Dyslipidemia     Immunosuppressed status (H)     Kidney replaced by transplant     AV block     Hemorrhoids, unspecified hemorrhoid type     Aftercare following organ transplant     Rotator cuff syndrome, right     CKD (chronic kidney disease) stage 2, GFR 60-89 ml/min     Vitamin D deficiency     History of atrial fibrillation     Elevated blood pressure reading without diagnosis of hypertension       Allergies   No Known Allergies    Medications   Current Outpatient Medications   Medication Sig     aspirin 81 MG tablet Take 1 tablet by mouth daily.     carvedilol (COREG) 3.125 MG tablet Take 1 tablet (3.125 mg) by mouth 2 times daily (with meals)     CELLCEPT (BRAND) 250 MG capsule Take 2 capsules (500 mg) by mouth 2 times daily     PROGRAF  (BRAND) 1 MG capsule TAKE ONE CAPSULE BY MOUTH TWICE A DAY     Vitamin D3 (CHOLECALCIFEROL) 25 mcg (1000 units) tablet Take 1 tablet (25 mcg) by mouth daily     No current facility-administered medications for this visit.     Medications Discontinued During This Encounter   Medication Reason     Vitamin D3 (CHOLECALCIFEROL) 25 mcg (1000 units) tablet      Vitamin D3 (CHOLECALCIFEROL) 25 mcg (1000 units) tablet        Physical Exam   Vital Signs: There were no vitals taken for this visit.    GENERAL APPEARANCE: alert and no distress  HENT: no obvious abnormalities on appearance  RESP: breathing appears unremarkable with normal rate, no audible wheezing or cough and no apparent shortness of breath with conversation  MS: extremities normal - no gross deformities noted  SKIN: no apparent rash and normal skin tone  NEURO: speech is clear with no obvious neurological deficits  PSYCH: mentation appears normal and affect normal      Data         Latest Ref Rng & Units 5/3/2023     7:36 AM 2/1/2023     7:53 AM 11/2/2022     8:12 AM   Renal   Sodium 136 - 145 mmol/L 139   139   136     K 3.4 - 5.3 mmol/L 5.2   4.4   4.3     Cl 98 - 107 mmol/L 104   105   106     Cl (external) 98 - 107 mmol/L 104   105   106     CO2 22 - 29 mmol/L 24   20   25     Urea Nitrogen 7 - 30 mg/dL   22     Urea Nitrogen 8.0 - 23.0 mg/dL 25.6   21.9      Creatinine 0.67 - 1.17 mg/dL 1.31   1.17   1.13     Glucose 70 - 99 mg/dL 94   85   89     Calcium 8.8 - 10.2 mg/dL 10.5   10.0   9.5           Latest Ref Rng & Units 2/2/2022     7:55 AM 7/28/2014     8:05 AM 7/24/2014     8:00 AM   Bone Health   Phosphorus 2.5 - 4.5 mg/dL  2.1   2.4     Parathyroid Hormone Intact 18 - 80 pg/mL 72       Vit D Def 20 - 75 ug/L 13             Latest Ref Rng & Units 5/3/2023     7:36 AM 2/1/2023     7:53 AM 11/2/2022     8:12 AM   Heme   WBC 4.0 - 11.0 10e3/uL 3.3   3.0   3.1     Hgb 13.3 - 17.7 g/dL 14.0   13.6   13.5     Plt 150 - 450 10e3/uL 140   127   117            Latest Ref Rng & Units 10/29/2014     7:58 AM 10/1/2014     8:24 AM 5/3/2014     4:56 PM   Liver   AP 40 - 150 U/L 132   122   92     TBili 0.2 - 1.3 mg/dL 0.6   0.4   0.3     Bilirubin Direct 0.0 - 0.2 mg/dL 0.1   0.1      ALT 0 - 70 U/L 24   29   25     AST 0 - 45 U/L 20   20   27     Tot Protein 6.8 - 8.8 g/dL 7.3   6.7   7.1     Albumin 3.4 - 5.0 g/dL 3.8   3.4   3.9           Latest Ref Rng & Units 5/5/2014     6:28 AM 5/3/2014     4:56 PM   Pancreas   A1C 4.3 - 6.0 % 5.6   5.2           Latest Ref Rng & Units 7/1/2015     7:57 AM 12/3/2014     8:04 AM 5/6/2014     5:57 AM   Iron studies   Iron 35 - 180 ug/dL 69   37   45     Iron Saturation Index 15 - 46 % 21   12   20           Latest Ref Rng & Units 2/5/2020     8:03 AM 8/7/2019     8:08 AM 6/6/2018     8:02 AM   [facility] Txp Virology   BK Spec  Plasma   Plasma   Plasma     BK Res BKNEG^BK Virus DNA Not Detected copies/mL BK Virus DNA Not Detected   BK Virus DNA Not Detected   BK Virus DNA Not Detected     BK Log <2.7 Log copies/mL Not Calculated   Not Calculated   Not Calculated          Recent Labs   Lab Test 11/02/22  0812 02/01/23  0753 05/03/23  0736   DOSTAC 11/1/2022 1/31/2023 5/2/2023   TACROL 5.7 5.0 5.1     Recent Labs   Lab Test 02/03/16  0802 03/02/16  0756 04/06/16  0805   DOSMPA LAST DOSE 8PM ON 2/02/2016 last dose 8:00PM on 3/01/2016 2000 04/05/16   MPACID 1.48 1.78 2.32   MPAG 19.6* 22.3* 18.8*

## 2023-05-16 ENCOUNTER — MYC MEDICAL ADVICE (OUTPATIENT)
Dept: TRANSPLANT | Facility: CLINIC | Age: 78
End: 2023-05-16
Payer: MEDICARE

## 2023-05-16 ENCOUNTER — TELEPHONE (OUTPATIENT)
Dept: TRANSPLANT | Facility: CLINIC | Age: 78
End: 2023-05-16
Payer: MEDICARE

## 2023-05-16 DIAGNOSIS — E83.52 HYPERCALCEMIA: ICD-10-CM

## 2023-05-16 DIAGNOSIS — Z94.0 KIDNEY TRANSPLANTED: Primary | ICD-10-CM

## 2023-05-16 NOTE — TELEPHONE ENCOUNTER
----- Message from Roscoe Razo MD sent at 5/15/2023  3:44 PM CDT -----  Regarding: plan  MARIO Paris: please recheck T cell subset, PTH, vitamin D level with next labs. I had him hold vitamin D supplement for rising calcium.    MARIO Dent: can you please call him in about 10 days to see what his Bps are doing? I asked him to start logging them.     Thanks,  Roscoe

## 2023-05-25 ENCOUNTER — PATIENT OUTREACH (OUTPATIENT)
Dept: NEPHROLOGY | Facility: CLINIC | Age: 78
End: 2023-05-25
Payer: MEDICARE

## 2023-05-25 NOTE — PROGRESS NOTES
----- Message -----   From: Roscoe Razo MD   Sent: 5/15/2023   3:45 PM CDT   To: Danuta Paris RN; Danuta Dent RN   Subject: vincent Paris: please recheck T cell subset, PTH, vitamin D level with next labs. I had him hold vitamin D supplement for rising calcium.     MARIO Dent: can you please call him in about 10 days to see what his Bps are doing? I asked him to start logging them.     ThanksRoscoe   ---------------------------------------------------------------------  5/25- mychar message sent

## 2023-08-02 ENCOUNTER — LAB (OUTPATIENT)
Dept: LAB | Facility: CLINIC | Age: 78
End: 2023-08-02
Payer: MEDICARE

## 2023-08-02 ENCOUNTER — TELEPHONE (OUTPATIENT)
Dept: TRANSPLANT | Facility: CLINIC | Age: 78
End: 2023-08-02

## 2023-08-02 DIAGNOSIS — Z94.0 KIDNEY TRANSPLANTED: ICD-10-CM

## 2023-08-02 DIAGNOSIS — E55.9 VITAMIN D DEFICIENCY: ICD-10-CM

## 2023-08-02 DIAGNOSIS — Z94.0 KIDNEY TRANSPLANTED: Primary | ICD-10-CM

## 2023-08-02 LAB
ALBUMIN MFR UR ELPH: 6.2 MG/DL
ANION GAP SERPL CALCULATED.3IONS-SCNC: 8 MMOL/L (ref 7–15)
BUN SERPL-MCNC: 21.4 MG/DL (ref 8–23)
CALCIUM SERPL-MCNC: 9.6 MG/DL (ref 8.8–10.2)
CHLORIDE SERPL-SCNC: 105 MMOL/L (ref 98–107)
CREAT SERPL-MCNC: 1.34 MG/DL (ref 0.67–1.17)
CREAT UR-MCNC: 70.6 MG/DL
DEPRECATED CALCIDIOL+CALCIFEROL SERPL-MC: 30 UG/L (ref 20–75)
DEPRECATED HCO3 PLAS-SCNC: 24 MMOL/L (ref 22–29)
ERYTHROCYTE [DISTWIDTH] IN BLOOD BY AUTOMATED COUNT: 12.2 % (ref 10–15)
GFR SERPL CREATININE-BSD FRML MDRD: 55 ML/MIN/1.73M2
GLUCOSE SERPL-MCNC: 104 MG/DL (ref 70–99)
HCT VFR BLD AUTO: 42.2 % (ref 40–53)
HGB BLD-MCNC: 13.8 G/DL (ref 13.3–17.7)
MCH RBC QN AUTO: 28.8 PG (ref 26.5–33)
MCHC RBC AUTO-ENTMCNC: 32.7 G/DL (ref 31.5–36.5)
MCV RBC AUTO: 88 FL (ref 78–100)
PLATELET # BLD AUTO: 137 10E3/UL (ref 150–450)
POTASSIUM SERPL-SCNC: 5.4 MMOL/L (ref 3.4–5.3)
PROT/CREAT 24H UR: 0.09 MG/MG CR (ref 0–0.2)
RBC # BLD AUTO: 4.8 10E6/UL (ref 4.4–5.9)
SODIUM SERPL-SCNC: 137 MMOL/L (ref 136–145)
TACROLIMUS BLD-MCNC: 4.4 UG/L (ref 5–15)
TME LAST DOSE: ABNORMAL H
TME LAST DOSE: ABNORMAL H
WBC # BLD AUTO: 3.1 10E3/UL (ref 4–11)

## 2023-08-02 PROCEDURE — 80197 ASSAY OF TACROLIMUS: CPT

## 2023-08-02 PROCEDURE — 36415 COLL VENOUS BLD VENIPUNCTURE: CPT

## 2023-08-02 PROCEDURE — 82306 VITAMIN D 25 HYDROXY: CPT

## 2023-08-02 PROCEDURE — 84156 ASSAY OF PROTEIN URINE: CPT

## 2023-08-02 PROCEDURE — 85027 COMPLETE CBC AUTOMATED: CPT

## 2023-08-02 PROCEDURE — 80048 BASIC METABOLIC PNL TOTAL CA: CPT

## 2023-08-02 NOTE — TELEPHONE ENCOUNTER
ISSUE  Creatinine 1.34 slightly above baseline 1.1-1.3  Potassium 5.4 slightly above normal    PLAN  Call patient to check for:  Recent illness?  Missed or new medications?  Fever, vomiting, diarrhea?  Dizziness or low BP?  Symptoms of UTI?    Encourage a strict low potassium diet, about 2000 mg per day. Encourage use of nutrition label reading.     Foods to avoid include: Avocado, banana, potatoes, orange juice, canned foods. Low potassium foods include: apples, pasta, berries, mixed vegetables.     Please encourage good hydration (to help with creatinine and potassium) and pt should repeat labs in 2-4 weeks.     LPN TASK:  Please contact patient to assess and review the plan.  Place repeat lab orders as necessary.

## 2023-08-02 NOTE — TELEPHONE ENCOUNTER
Call placed to patient. Patient denies any recent illness, dehydration or medication changes. Patient v\u to improve hydration; decrease his dietary potassium intake and repeat level in 2-4 weeks. Order placed

## 2023-11-08 ENCOUNTER — LAB (OUTPATIENT)
Dept: LAB | Facility: CLINIC | Age: 78
End: 2023-11-08
Payer: MEDICARE

## 2023-11-08 DIAGNOSIS — Z94.0 KIDNEY TRANSPLANTED: ICD-10-CM

## 2023-11-08 DIAGNOSIS — Z29.89 NEED FOR PNEUMOCYSTIS PROPHYLAXIS: ICD-10-CM

## 2023-11-08 DIAGNOSIS — E83.52 HYPERCALCEMIA: ICD-10-CM

## 2023-11-08 DIAGNOSIS — D84.9 IMMUNOCOMPROMISED (H): ICD-10-CM

## 2023-11-08 LAB
ANION GAP SERPL CALCULATED.3IONS-SCNC: 9 MMOL/L (ref 7–15)
BUN SERPL-MCNC: 12.5 MG/DL (ref 8–23)
CALCIUM SERPL-MCNC: 9.8 MG/DL (ref 8.8–10.2)
CD3 CELLS # BLD: 361 CELLS/UL (ref 603–2990)
CD3 CELLS NFR BLD: 58 % (ref 49–84)
CD3+CD4+ CELLS # BLD: 186 CELLS/UL (ref 441–2156)
CD3+CD4+ CELLS NFR BLD: 30 % (ref 28–63)
CD3+CD4+ CELLS/CD3+CD8+ CLL BLD: 1.03 % (ref 1.4–2.6)
CD3+CD8+ CELLS # BLD: 181 CELLS/UL (ref 125–1312)
CD3+CD8+ CELLS NFR BLD: 29 % (ref 10–40)
CHLORIDE SERPL-SCNC: 101 MMOL/L (ref 98–107)
CREAT SERPL-MCNC: 1.09 MG/DL (ref 0.67–1.17)
DEPRECATED HCO3 PLAS-SCNC: 24 MMOL/L (ref 22–29)
EGFRCR SERPLBLD CKD-EPI 2021: 70 ML/MIN/1.73M2
ERYTHROCYTE [DISTWIDTH] IN BLOOD BY AUTOMATED COUNT: 12.3 % (ref 10–15)
GLUCOSE SERPL-MCNC: 103 MG/DL (ref 70–99)
HCT VFR BLD AUTO: 39.6 % (ref 40–53)
HGB BLD-MCNC: 12.5 G/DL (ref 13.3–17.7)
MCH RBC QN AUTO: 28.3 PG (ref 26.5–33)
MCHC RBC AUTO-ENTMCNC: 31.6 G/DL (ref 31.5–36.5)
MCV RBC AUTO: 90 FL (ref 78–100)
PLATELET # BLD AUTO: 179 10E3/UL (ref 150–450)
POTASSIUM SERPL-SCNC: 5.1 MMOL/L (ref 3.4–5.3)
RBC # BLD AUTO: 4.42 10E6/UL (ref 4.4–5.9)
SODIUM SERPL-SCNC: 134 MMOL/L (ref 135–145)
T CELL COMMENT: ABNORMAL
TACROLIMUS BLD-MCNC: 4.3 UG/L (ref 5–15)
TME LAST DOSE: ABNORMAL H
TME LAST DOSE: ABNORMAL H
VIT D+METAB SERPL-MCNC: 25 NG/ML (ref 20–50)
WBC # BLD AUTO: 2.2 10E3/UL (ref 4–11)

## 2023-11-08 PROCEDURE — 82306 VITAMIN D 25 HYDROXY: CPT

## 2023-11-08 PROCEDURE — 80048 BASIC METABOLIC PNL TOTAL CA: CPT

## 2023-11-08 PROCEDURE — 86359 T CELLS TOTAL COUNT: CPT

## 2023-11-08 PROCEDURE — 82955 ASSAY OF G6PD ENZYME: CPT | Mod: 90

## 2023-11-08 PROCEDURE — 80197 ASSAY OF TACROLIMUS: CPT

## 2023-11-08 PROCEDURE — 86360 T CELL ABSOLUTE COUNT/RATIO: CPT

## 2023-11-08 PROCEDURE — 85027 COMPLETE CBC AUTOMATED: CPT

## 2023-11-08 PROCEDURE — 99000 SPECIMEN HANDLING OFFICE-LAB: CPT

## 2023-11-08 PROCEDURE — 36415 COLL VENOUS BLD VENIPUNCTURE: CPT

## 2023-11-09 ENCOUNTER — TELEPHONE (OUTPATIENT)
Dept: TRANSPLANT | Facility: CLINIC | Age: 78
End: 2023-11-09
Payer: MEDICARE

## 2023-11-09 DIAGNOSIS — D72.819 LEUKOPENIA: ICD-10-CM

## 2023-11-09 DIAGNOSIS — D84.9 IMMUNOCOMPROMISED (H): ICD-10-CM

## 2023-11-09 DIAGNOSIS — Z94.0 KIDNEY TRANSPLANTED: ICD-10-CM

## 2023-11-09 DIAGNOSIS — Z29.89 NEED FOR PNEUMOCYSTIS PROPHYLAXIS: Primary | ICD-10-CM

## 2023-11-09 NOTE — TELEPHONE ENCOUNTER
ISSUE:   Lab results 11/8/23:  - Leukopenia WBC 2.2.   - Absolute CD4 count 186    PLAN:   Message  Received: Yesterday  Roscoe Razo MD Buboltz, Brittany J RN  Needs to be on pjp ppx. Please send G6PD. If adequate start dapsone. Repeat T cell subset in 3 months    Message  Received: Today  Roscoe Razo MD Buboltz, Brittany J RN  Please send CBC w/ diff in the future on this patient    OUTCOME:   [facility] replaced CBC w/plt standing order to include CBC w/plts and differential every 3 months.  Added the G6PD to labs; in process.   T cell subset reordered with expected date of ~2/8/24.    Henna Castrejon, RN, BSN (covering for Danuta MARTIN)  Solid Organ Transplant, Post Kidney and Pancreas  Transplant Care Coordinator  [phone]

## 2023-11-11 LAB — G6PD RBC-CCNT: 15.4 U/G HB

## 2023-11-13 RX ORDER — DAPSONE 25 MG/1
50 TABLET ORAL DAILY
Qty: 180 TABLET | Refills: 3 | Status: SHIPPED | OUTPATIENT
Start: 2023-11-13 | End: 2024-02-12

## 2023-11-13 NOTE — TELEPHONE ENCOUNTER
Roscoe Razo MD  11/12/2023 11:13 AM CST       Please start dapsone 50mg daily     OUTCOME  Spoke to pt at length regarding dapsone and repeating T-cell subset in 3 months.   Pt did mention he had a recent viral respiratory illness.  Pt confirmed labs scheduled for 2/7/23  Script sent to pharmacy.   Lab ordes in place.     Pt appreciated call. Reviewed his immunization record and clarified questions for him.

## 2023-11-30 DIAGNOSIS — Z94.0 KIDNEY TRANSPLANTED: ICD-10-CM

## 2023-11-30 RX ORDER — MYCOPHENOLATE MOFETIL 250 MG/1
500 CAPSULE ORAL 2 TIMES DAILY
Qty: 120 CAPSULE | Refills: 11 | Status: SHIPPED | OUTPATIENT
Start: 2023-11-30

## 2023-12-27 DIAGNOSIS — Z94.0 KIDNEY TRANSPLANTED: Primary | ICD-10-CM

## 2023-12-27 RX ORDER — TACROLIMUS 1 MG/1
1 CAPSULE, GELATIN COATED ORAL 2 TIMES DAILY
Qty: 60 CAPSULE | Refills: 11 | Status: SHIPPED | OUTPATIENT
Start: 2023-12-27

## 2024-02-01 NOTE — TELEPHONE ENCOUNTER
Requesting a refill too soon. Patient received a year supply of medication on 2/2/22. Refusing refill request and closing encounter.     Lindsey Carter RN on 12/20/2022 at 10:43 AM     Subjective   Patient ID: Pablo Hesnon is a 78 y.o. male.    HPI  Pablo Henson is a 78 y.o. male presents for a follow up on BPH/LUTS and s/p TURP on 03/23/2023.     Taking Myrbetriq but is unsure if it has helped his LUTS. Still experiencing urgency and leakage, not satisfied about this issue however overall learning to accept it.    Was switched to vesicare on November, December and early January. He had good response, but then had to stop it because of constipation.  Since he stopped it he has noticed his symptoms have been tolerable, and is at this point okay with his symptoms.    Now nocturia x2-3 times, still has incontinence    Review of Systems    Objective   Physical Exam    Assessment/Plan   Diagnoses and all orders for this visit:  Overactive bladder  Benign prostatic hyperplasia with lower urinary tract symptoms, symptom details unspecified    Resume Vesicare 10 mg, will skip 1-2 dosages per week to try and reduce side effects  Aggressively treat constipation by increasing Metamucil dosage  Follow-up in 6-month

## 2024-02-07 ENCOUNTER — LAB (OUTPATIENT)
Dept: LAB | Facility: CLINIC | Age: 79
End: 2024-02-07
Payer: MEDICARE

## 2024-02-07 DIAGNOSIS — D72.819 LEUKOPENIA: ICD-10-CM

## 2024-02-07 DIAGNOSIS — D84.9 IMMUNOCOMPROMISED (H): ICD-10-CM

## 2024-02-07 DIAGNOSIS — Z94.0 KIDNEY TRANSPLANTED: ICD-10-CM

## 2024-02-07 DIAGNOSIS — Z29.89 NEED FOR PNEUMOCYSTIS PROPHYLAXIS: ICD-10-CM

## 2024-02-07 LAB
ALBUMIN MFR UR ELPH: <6 MG/DL
ANION GAP SERPL CALCULATED.3IONS-SCNC: 6 MMOL/L (ref 7–15)
BASOPHILS # BLD AUTO: 0 10E3/UL (ref 0–0.2)
BASOPHILS NFR BLD AUTO: 1 %
BUN SERPL-MCNC: 19.3 MG/DL (ref 8–23)
CALCIUM SERPL-MCNC: 9.9 MG/DL (ref 8.8–10.2)
CHLORIDE SERPL-SCNC: 104 MMOL/L (ref 98–107)
CREAT SERPL-MCNC: 1.16 MG/DL (ref 0.67–1.17)
CREAT UR-MCNC: 54.5 MG/DL
DEPRECATED HCO3 PLAS-SCNC: 26 MMOL/L (ref 22–29)
EGFRCR SERPLBLD CKD-EPI 2021: 65 ML/MIN/1.73M2
EOSINOPHIL # BLD AUTO: 0.1 10E3/UL (ref 0–0.7)
EOSINOPHIL NFR BLD AUTO: 3 %
ERYTHROCYTE [DISTWIDTH] IN BLOOD BY AUTOMATED COUNT: 13.8 % (ref 10–15)
GLUCOSE SERPL-MCNC: 94 MG/DL (ref 70–99)
HCT VFR BLD AUTO: 39.3 % (ref 40–53)
HGB BLD-MCNC: 12.4 G/DL (ref 13.3–17.7)
IMM GRANULOCYTES # BLD: 0 10E3/UL
IMM GRANULOCYTES NFR BLD: 0 %
LYMPHOCYTES # BLD AUTO: 0.7 10E3/UL (ref 0.8–5.3)
LYMPHOCYTES NFR BLD AUTO: 27 %
MCH RBC QN AUTO: 29.1 PG (ref 26.5–33)
MCHC RBC AUTO-ENTMCNC: 31.6 G/DL (ref 31.5–36.5)
MCV RBC AUTO: 92 FL (ref 78–100)
MONOCYTES # BLD AUTO: 0.4 10E3/UL (ref 0–1.3)
MONOCYTES NFR BLD AUTO: 16 %
NEUTROPHILS # BLD AUTO: 1.5 10E3/UL (ref 1.6–8.3)
NEUTROPHILS NFR BLD AUTO: 54 %
PLATELET # BLD AUTO: 131 10E3/UL (ref 150–450)
POTASSIUM SERPL-SCNC: 4.8 MMOL/L (ref 3.4–5.3)
PROT/CREAT 24H UR: NORMAL MG/G{CREAT}
RBC # BLD AUTO: 4.26 10E6/UL (ref 4.4–5.9)
SODIUM SERPL-SCNC: 136 MMOL/L (ref 135–145)
TACROLIMUS BLD-MCNC: 4.2 UG/L (ref 5–15)
TME LAST DOSE: ABNORMAL H
TME LAST DOSE: ABNORMAL H
WBC # BLD AUTO: 2.7 10E3/UL (ref 4–11)

## 2024-02-07 PROCEDURE — 36415 COLL VENOUS BLD VENIPUNCTURE: CPT

## 2024-02-07 PROCEDURE — 80048 BASIC METABOLIC PNL TOTAL CA: CPT

## 2024-02-07 PROCEDURE — 85025 COMPLETE CBC W/AUTO DIFF WBC: CPT

## 2024-02-07 PROCEDURE — 84156 ASSAY OF PROTEIN URINE: CPT

## 2024-02-07 PROCEDURE — 86359 T CELLS TOTAL COUNT: CPT

## 2024-02-07 PROCEDURE — 86360 T CELL ABSOLUTE COUNT/RATIO: CPT

## 2024-02-07 PROCEDURE — 80197 ASSAY OF TACROLIMUS: CPT

## 2024-02-08 LAB
CD3 CELLS # BLD: 484 CELLS/UL (ref 603–2990)
CD3 CELLS NFR BLD: 57 % (ref 49–84)
CD3+CD4+ CELLS # BLD: 249 CELLS/UL (ref 441–2156)
CD3+CD4+ CELLS NFR BLD: 29 % (ref 28–63)
CD3+CD4+ CELLS/CD3+CD8+ CLL BLD: 0.99 % (ref 1.4–2.6)
CD3+CD8+ CELLS # BLD: 251 CELLS/UL (ref 125–1312)
CD3+CD8+ CELLS NFR BLD: 29 % (ref 10–40)
T CELL COMMENT: ABNORMAL

## 2024-02-12 ENCOUNTER — TELEPHONE (OUTPATIENT)
Dept: TRANSPLANT | Facility: CLINIC | Age: 79
End: 2024-02-12
Payer: MEDICARE

## 2024-02-12 ENCOUNTER — MYC MEDICAL ADVICE (OUTPATIENT)
Dept: TRANSPLANT | Facility: CLINIC | Age: 79
End: 2024-02-12
Payer: MEDICARE

## 2024-02-12 NOTE — TELEPHONE ENCOUNTER
----- Message from Roscoe Razo MD sent at 2/12/2024 12:14 PM CST -----  Ok to stop dapsone. Thanks   ----- Message -----  From: Danuta Paris RN  Sent: 2/12/2024  12:12 PM CST  To: Roscoe Razo MD    Repeat CD4 up to 249. Ok to stop Dapsone?

## 2024-02-16 SDOH — HEALTH STABILITY: PHYSICAL HEALTH: ON AVERAGE, HOW MANY MINUTES DO YOU ENGAGE IN EXERCISE AT THIS LEVEL?: 60 MIN

## 2024-02-16 SDOH — HEALTH STABILITY: PHYSICAL HEALTH: ON AVERAGE, HOW MANY DAYS PER WEEK DO YOU ENGAGE IN MODERATE TO STRENUOUS EXERCISE (LIKE A BRISK WALK)?: 4 DAYS

## 2024-02-16 ASSESSMENT — SOCIAL DETERMINANTS OF HEALTH (SDOH)
IN A TYPICAL WEEK, HOW MANY TIMES DO YOU TALK ON THE PHONE WITH FAMILY, FRIENDS, OR NEIGHBORS?: TWICE A WEEK
HOW OFTEN DO YOU GET TOGETHER WITH FRIENDS OR RELATIVES?: ONCE A WEEK
HOW OFTEN DO YOU GET TOGETHER WITH FRIENDS OR RELATIVES?: ONCE A WEEK
HOW OFTEN DO YOU ATTEND CHURCH OR RELIGIOUS SERVICES?: MORE THAN 4 TIMES PER YEAR
HOW OFTEN DO YOU ATTENT MEETINGS OF THE CLUB OR ORGANIZATION YOU BELONG TO?: MORE THAN 4 TIMES PER YEAR
DO YOU BELONG TO ANY CLUBS OR ORGANIZATIONS SUCH AS CHURCH GROUPS UNIONS, FRATERNAL OR ATHLETIC GROUPS, OR SCHOOL GROUPS?: YES

## 2024-02-16 ASSESSMENT — LIFESTYLE VARIABLES
HOW OFTEN DO YOU HAVE A DRINK CONTAINING ALCOHOL: NEVER
SKIP TO QUESTIONS 9-10: 1
AUDIT-C TOTAL SCORE: 0
HOW OFTEN DO YOU HAVE SIX OR MORE DRINKS ON ONE OCCASION: NEVER
HOW MANY STANDARD DRINKS CONTAINING ALCOHOL DO YOU HAVE ON A TYPICAL DAY: PATIENT DOES NOT DRINK

## 2024-02-22 ENCOUNTER — OFFICE VISIT (OUTPATIENT)
Dept: FAMILY MEDICINE | Facility: CLINIC | Age: 79
End: 2024-02-22
Payer: MEDICARE

## 2024-02-22 VITALS
HEIGHT: 68 IN | SYSTOLIC BLOOD PRESSURE: 128 MMHG | RESPIRATION RATE: 12 BRPM | DIASTOLIC BLOOD PRESSURE: 72 MMHG | TEMPERATURE: 98.1 F | BODY MASS INDEX: 24.86 KG/M2 | HEART RATE: 65 BPM | WEIGHT: 164 LBS | OXYGEN SATURATION: 100 %

## 2024-02-22 DIAGNOSIS — Z28.21 COVID-19 VACCINATION DECLINED: ICD-10-CM

## 2024-02-22 DIAGNOSIS — Z94.0 KIDNEY REPLACED BY TRANSPLANT: ICD-10-CM

## 2024-02-22 DIAGNOSIS — Z94.0 HTN, KIDNEY TRANSPLANT RELATED: ICD-10-CM

## 2024-02-22 DIAGNOSIS — I15.1 HTN, KIDNEY TRANSPLANT RELATED: ICD-10-CM

## 2024-02-22 DIAGNOSIS — Z86.79 HISTORY OF ATRIAL FIBRILLATION: ICD-10-CM

## 2024-02-22 DIAGNOSIS — Z00.00 WELLNESS EXAMINATION: Primary | ICD-10-CM

## 2024-02-22 DIAGNOSIS — Z29.11 NEED FOR VACCINATION AGAINST RESPIRATORY SYNCYTIAL VIRUS: ICD-10-CM

## 2024-02-22 DIAGNOSIS — Z86.69 H/O: BELL'S PALSY: ICD-10-CM

## 2024-02-22 DIAGNOSIS — D84.9 IMMUNOSUPPRESSED STATUS (H): ICD-10-CM

## 2024-02-22 DIAGNOSIS — N18.2 CKD (CHRONIC KIDNEY DISEASE) STAGE 2, GFR 60-89 ML/MIN: ICD-10-CM

## 2024-02-22 PROCEDURE — G0439 PPPS, SUBSEQ VISIT: HCPCS | Performed by: FAMILY MEDICINE

## 2024-02-22 PROCEDURE — 99214 OFFICE O/P EST MOD 30 MIN: CPT | Mod: 25 | Performed by: FAMILY MEDICINE

## 2024-02-22 RX ORDER — CARVEDILOL 3.12 MG/1
3.12 TABLET ORAL 2 TIMES DAILY WITH MEALS
Qty: 180 TABLET | Refills: 3 | Status: SHIPPED | OUTPATIENT
Start: 2024-02-22

## 2024-02-22 RX ORDER — RESPIRATORY SYNCYTIAL VIRUS VACCINE 120MCG/0.5
0.5 KIT INTRAMUSCULAR ONCE
Qty: 1 EACH | Refills: 0 | Status: CANCELLED | OUTPATIENT
Start: 2024-02-22 | End: 2024-02-22

## 2024-02-22 NOTE — ASSESSMENT & PLAN NOTE
He has a note from the pharmacy that this was administered but a computer glitch allow this to not be placed in epic.  Noted

## 2024-02-22 NOTE — PROGRESS NOTES
Preventive Care Visit  [facility]  Wilder Lion MD, Family Medicine  Feb 22, 2024      SUBJECTIVE:   Vikram is a 78 year old, presenting for the following:  Wellness Visit (WELLNESS)        2/22/2024     8:23 AM   Additional Questions   Roomed by BLAISE hu     Are you in the first 12 months of your Medicare coverage?  No    HPI    Today's PHQ-2 Score:       2/22/2024     8:23 AM   PHQ-2 ( 1999 [employer])   Q1: Little interest or pleasure in doing things 0   Q2: Feeling down, depressed or hopeless 0   PHQ-2 Score 0   Q1: Little interest or pleasure in doing things Not at all   Q2: Feeling down, depressed or hopeless Not at all   PHQ-2 Score 0           Have you ever done Advance Care Planning? (For example, a Health Directive, POLST, or a discussion with a medical provider or your loved ones about your wishes): No, advance care planning information given to patient to review.  Patient plans to discuss their wishes with loved ones or provider.         Fall risk     click delete button to remove this line now  Cognitive Screening   1) Repeat 3 items (village, kitchen, baby)    2) Clock draw: NORMAL  3) 3 item recall: Recalls 3 objects  Results: NORMAL clock, 1-2 items recalled: COGNITIVE IMPAIRMENT LESS LIKELY    Mini-CogTM Copyright S Lissy. Licensed by the author for use in [facility]; reprinted with permission (jose alfredo@.[facility]). All rights reserved.      Do you have sleep apnea, excessive snoring or daytime drowsiness? : no    Reviewed and updated as needed this visit by clinical staff   Tobacco  Allergies  Meds              Reviewed and updated as needed this visit by Provider                  Social History     Tobacco Use    Smoking status: Never    Smokeless tobacco: Never   Substance Use Topics    Alcohol use: No     Alcohol/week: 0.0 standard drinks of alcohol             2/16/2024    10:16 AM   Alcohol Use   Prescreen: >3 drinks/day or >7 drinks/week? Not  "Applicable     Do you have a current opioid prescription? No  Do you use any other controlled substances or medications that are not prescribed by a provider? None              Current providers sharing in care for this patient include:   Patient Care Team:  Wilder Lion MD as PCP - General (Family Practice)  Adan Hill MD as MD (Nephrology)  Sarkis Prieto MD as MD (Nephrology)  Wilder Lion MD as Assigned PCP  Roscoe Razo MD as MD (Nephrology)  Roscoe Razo MD as Assigned Nephrology Provider    The following health maintenance items are reviewed in Epic and correct as of today:  Health Maintenance   Topic Date Due    A1C  05/05/2015    COVID-19 Vaccine (6 - 2023-24 season) 09/01/2023    MEDICARE ANNUAL WELLNESS VISIT  02/21/2024    RSV VACCINE (Pregnancy & 60+) (1 - 1-dose 60+ series) 02/22/2025 (Originally 5/18/2007)    MICROALBUMIN  05/03/2024    BMP  02/07/2025    ANNUAL REVIEW OF HM ORDERS  02/22/2025    FALL RISK ASSESSMENT  02/22/2025    DTAP/TDAP/TD IMMUNIZATION (2 - Td or Tdap) 10/01/2025    ADVANCE CARE PLANNING  02/22/2029    HEPATITIS C SCREENING  Completed    PHQ-2 (once per calendar year)  Completed    INFLUENZA VACCINE  Completed    Pneumococcal Vaccine: 65+ Years  Completed    URINALYSIS  Completed    ZOSTER IMMUNIZATION  Completed    IPV IMMUNIZATION  Aged Out    HPV IMMUNIZATION  Aged Out    MENINGITIS IMMUNIZATION  Aged Out    RSV MONOCLONAL ANTIBODY  Aged Out    LIPID  Discontinued    GLUCOSE  Discontinued    COLORECTAL CANCER SCREENING  Discontinued             Review of Systems   A complete review of systems is negative    OBJECTIVE:   BP (!) 161/66 (BP Location: Right arm, Patient Position: Sitting, Cuff Size: Adult Regular)   Pulse 65   Temp 98.1  F (36.7  C) (Oral)   Resp 12   Ht 1.727 m (5' 8\")   Wt 74.4 kg (164 lb)   SpO2 100%   BMI 24.94 kg/m     Estimated body mass index is 24.94 kg/m  as calculated from the following:    Height as of this " "encounter: 1.727 m (5' 8\").    Weight as of this encounter: 74.4 kg (164 lb).    BP Readings from Last 1 Encounters:   02/22/24 128/72     On repeat  Physical Exam  Constitutional:       Appearance: Normal appearance.   HENT:      Head: Atraumatic.      Right Ear: Tympanic membrane normal.      Left Ear: Tympanic membrane normal.      Nose: Nose normal.      Mouth/Throat:      Mouth: Mucous membranes are moist.   Eyes:      Pupils: Pupils are equal, round, and reactive to light.   Cardiovascular:      Rate and Rhythm: Normal rate and regular rhythm.      Heart sounds: Normal heart sounds.   Pulmonary:      Effort: Pulmonary effort is normal.      Breath sounds: Normal breath sounds.   Abdominal:      General: Abdomen is flat. Bowel sounds are normal.   Musculoskeletal:      Cervical back: Neck supple.      Comments: Vascular access port left arm   Skin:     General: Skin is warm and dry.   Neurological:      Mental Status: He is alert.      Comments: As described   Psychiatric:         Mood and Affect: Mood normal.               ASSESSMENT / PLAN:     Problem List Items Addressed This Visit       CKD (chronic kidney disease) stage 2, GFR 60-89 ml/min     GFR Estimate   Date Value Ref Range Status   02/07/2024 65 >60 mL/min/1.73m2 Final   02/03/2021 52 (L) >60 mL/min/[1.73_m2] Final     Comment:     Non  GFR Calc  Starting 12/18/2018, serum creatinine based estimated GFR (eGFR) will be   calculated using the Chronic Kidney Disease Epidemiology Collaboration   (CKD-EPI) equation.     Followed by transplant           COVID-19 vaccination declined     He has avoided the last COVID booster.  I recommended.  He reports that the previous vaccines universally make him sick and therefore he is resistant.  I recommend it strongly         H/O: Bell's palsy     Soft left peripheral CN VII  noted today.  He is unaware.  Date therefore unclear         History of atrial fibrillation     Indefinite beta-blockade.  " Sinus rhythm today         Relevant Medications    carvedilol (COREG) 3.125 MG tablet    HTN, kidney transplant related     Much lower on repeat.  He reports similar numbers at home.  No changes         Immunosuppressed status (H24)    Kidney replaced by transplant     Regular lab, immunosuppressed.  Excellent GFR.  Followed by transplant         Need for vaccination against respiratory syncytial virus     He has a note from the pharmacy that this was administered but a computer glitch allow this to not be placed in epic.  Noted         Wellness examination - Primary       Patient has been advised of split billing requirements and indicates understanding: Yes      Counseling  Reviewed preventive health counseling, as reflected in patient instructions        He reports that he has never smoked. He has never used smokeless tobacco.      Appropriate preventive services were discussed with this patient, including applicable screening as appropriate for fall prevention, nutrition, physical activity, Tobacco-use cessation, weight loss and cognition.  Checklist reviewing preventive services available has been given to the patient.    Reviewed patients plan of care and provided an AVS. The Basic Care Plan (routine screening as documented in Health Maintenance) for Vikram meets the Care Plan requirement. This Care Plan has been established and reviewed with the Patient.          Signed Electronically by: Wilder Lion MD    Identified Health Risks  I have reviewed Opioid Use Disorder and Substance Use Disorder risk factors and made any needed referrals.

## 2024-02-22 NOTE — ASSESSMENT & PLAN NOTE
He has avoided the last COVID booster.  I recommended.  He reports that the previous vaccines universally make him sick and therefore he is resistant.  I recommend it strongly

## 2024-02-22 NOTE — ASSESSMENT & PLAN NOTE
GFR Estimate   Date Value Ref Range Status   02/07/2024 65 >60 mL/min/1.73m2 Final   02/03/2021 52 (L) >60 mL/min/[1.73_m2] Final     Comment:     Non  GFR Calc  Starting 12/18/2018, serum creatinine based estimated GFR (eGFR) will be   calculated using the Chronic Kidney Disease Epidemiology Collaboration   (CKD-EPI) equation.       Followed by transplant

## 2024-04-13 ENCOUNTER — HEALTH MAINTENANCE LETTER (OUTPATIENT)
Age: 79
End: 2024-04-13

## 2024-05-01 ENCOUNTER — LAB (OUTPATIENT)
Dept: LAB | Facility: CLINIC | Age: 79
End: 2024-05-01
Payer: MEDICARE

## 2024-05-01 DIAGNOSIS — Z94.0 KIDNEY TRANSPLANTED: ICD-10-CM

## 2024-05-01 LAB
ERYTHROCYTE [DISTWIDTH] IN BLOOD BY AUTOMATED COUNT: 12.3 % (ref 10–15)
HCT VFR BLD AUTO: 42.2 % (ref 40–53)
HGB BLD-MCNC: 13.6 G/DL (ref 13.3–17.7)
MCH RBC QN AUTO: 28.2 PG (ref 26.5–33)
MCHC RBC AUTO-ENTMCNC: 32.2 G/DL (ref 31.5–36.5)
MCV RBC AUTO: 88 FL (ref 78–100)
PLATELET # BLD AUTO: 128 10E3/UL (ref 150–450)
RBC # BLD AUTO: 4.82 10E6/UL (ref 4.4–5.9)
TACROLIMUS BLD-MCNC: 4.1 UG/L (ref 5–15)
TME LAST DOSE: ABNORMAL H
TME LAST DOSE: ABNORMAL H
WBC # BLD AUTO: 2.9 10E3/UL (ref 4–11)

## 2024-05-01 PROCEDURE — 80048 BASIC METABOLIC PNL TOTAL CA: CPT

## 2024-05-01 PROCEDURE — 85027 COMPLETE CBC AUTOMATED: CPT

## 2024-05-01 PROCEDURE — 84156 ASSAY OF PROTEIN URINE: CPT

## 2024-05-01 PROCEDURE — 80197 ASSAY OF TACROLIMUS: CPT

## 2024-05-01 PROCEDURE — 36415 COLL VENOUS BLD VENIPUNCTURE: CPT

## 2024-05-02 LAB
ALBUMIN MFR UR ELPH: 6.2 MG/DL
ANION GAP SERPL CALCULATED.3IONS-SCNC: 7 MMOL/L (ref 7–15)
BUN SERPL-MCNC: 23.1 MG/DL (ref 8–23)
CALCIUM SERPL-MCNC: 9.9 MG/DL (ref 8.8–10.2)
CHLORIDE SERPL-SCNC: 106 MMOL/L (ref 98–107)
CREAT SERPL-MCNC: 1.24 MG/DL (ref 0.67–1.17)
CREAT UR-MCNC: 67.7 MG/DL
DEPRECATED HCO3 PLAS-SCNC: 26 MMOL/L (ref 22–29)
EGFRCR SERPLBLD CKD-EPI 2021: 60 ML/MIN/1.73M2
GLUCOSE SERPL-MCNC: 80 MG/DL (ref 70–99)
POTASSIUM SERPL-SCNC: 4.5 MMOL/L (ref 3.4–5.3)
PROT/CREAT 24H UR: 0.09 MG/MG CR (ref 0–0.2)
SODIUM SERPL-SCNC: 139 MMOL/L (ref 135–145)

## 2024-05-20 ENCOUNTER — OFFICE VISIT (OUTPATIENT)
Dept: TRANSPLANT | Facility: CLINIC | Age: 79
End: 2024-05-20
Attending: INTERNAL MEDICINE
Payer: MEDICARE

## 2024-05-20 VITALS
OXYGEN SATURATION: 100 % | HEART RATE: 68 BPM | WEIGHT: 162.4 LBS | DIASTOLIC BLOOD PRESSURE: 82 MMHG | TEMPERATURE: 97.7 F | BODY MASS INDEX: 24.69 KG/M2 | SYSTOLIC BLOOD PRESSURE: 173 MMHG

## 2024-05-20 DIAGNOSIS — Z94.0 HTN, KIDNEY TRANSPLANT RELATED: ICD-10-CM

## 2024-05-20 DIAGNOSIS — I15.1 HTN, KIDNEY TRANSPLANT RELATED: ICD-10-CM

## 2024-05-20 DIAGNOSIS — D84.9 IMMUNOSUPPRESSED STATUS (H): ICD-10-CM

## 2024-05-20 DIAGNOSIS — Z94.0 KIDNEY REPLACED BY TRANSPLANT: Primary | ICD-10-CM

## 2024-05-20 DIAGNOSIS — T82.898A: ICD-10-CM

## 2024-05-20 DIAGNOSIS — Z48.298 AFTERCARE FOLLOWING ORGAN TRANSPLANT: ICD-10-CM

## 2024-05-20 PROBLEM — Z00.00 WELLNESS EXAMINATION: Status: RESOLVED | Noted: 2024-02-22 | Resolved: 2024-05-20

## 2024-05-20 PROBLEM — Z29.11 NEED FOR VACCINATION AGAINST RESPIRATORY SYNCYTIAL VIRUS: Status: RESOLVED | Noted: 2024-02-22 | Resolved: 2024-05-20

## 2024-05-20 PROBLEM — M75.101 ROTATOR CUFF SYNDROME, RIGHT: Status: RESOLVED | Noted: 2020-01-14 | Resolved: 2024-05-20

## 2024-05-20 PROBLEM — Z86.69 H/O: BELL'S PALSY: Status: RESOLVED | Noted: 2024-02-22 | Resolved: 2024-05-20

## 2024-05-20 PROBLEM — Z28.21 COVID-19 VACCINATION DECLINED: Status: RESOLVED | Noted: 2024-02-22 | Resolved: 2024-05-20

## 2024-05-20 PROCEDURE — G2211 COMPLEX E/M VISIT ADD ON: HCPCS | Performed by: INTERNAL MEDICINE

## 2024-05-20 PROCEDURE — 99214 OFFICE O/P EST MOD 30 MIN: CPT | Performed by: INTERNAL MEDICINE

## 2024-05-20 PROCEDURE — G0463 HOSPITAL OUTPT CLINIC VISIT: HCPCS | Performed by: INTERNAL MEDICINE

## 2024-05-20 ASSESSMENT — PAIN SCALES - GENERAL: PAINLEVEL: NO PAIN (0)

## 2024-05-20 NOTE — PATIENT INSTRUCTIONS
Patient Recommendations:  - I recommend COVID booster and PCV 20  -I recommend asking Danuta in September to schedule your 1 year follow up for May 2025    Transplant Patient Information  Your Post Transplant Coordinator is: Danuta Paris  For non urgent items, we encourage you to contact your coordinator/care team online via [employer]  You and your care team can also contact your transplant coordinator Monday - Friday, 8am - 5pm at [phone] (Option 2 to reach the coordinator or Option 4 to schedule an appointment).  After hours for urgent matters, please call [facility] at [phone].

## 2024-05-20 NOTE — NURSING NOTE
Chief Complaint   Patient presents with    RECHECK       BP (!) 173/82   Pulse 68   Temp 97.7  F (36.5  C) (Oral)   Wt 73.7 kg (162 lb 6.4 oz)   SpO2 100%   BMI 24.69 kg/m      Salvador Carrillo on 5/20/2024 at 12:43 PM

## 2024-05-20 NOTE — PROGRESS NOTES
TRANSPLANT NEPHROLOGY CHRONIC POST TRANSPLANT VISIT    Assessment & Plan   # DDKT: Stable   - Baseline Creatinine:  ~ 1.1-1.3   - Proteinuria: Normal (<0.2 grams)   - Date DSA Last Checked: Feb/2020      Latest DSA: No   - BK Viremia: Not checked recently due to time from transplant   - Kidney Tx Biopsy: No    - Labs every 3 months    # Immunosuppression: Tacrolimus immediate release (goal 4-6) and Mycophenolate mofetil (dose 500 mg every 12 hours)   - Continue with intensive monitoring of immunosuppression for efficacy and toxicity.   - Changes: Not at this time    # Infection Prophylaxis:   - PJP: None   Last CD4 Level: 249 (Feb/2024)    # Hypertension:  Per patient is variable ;  Goal BP: < 130/80   - Changes: Not at this time. Continue carvedilol 3.125mg bid.     # Anemia in Chronic Renal Disease: Hgb: Stable, near normal      VICTOR M: No   - Iron studies: Not checked recently    # Aneurysmal LUE AVF:   -No pain, has good thrill but patient thinks it is unsightly. Wants to think about AVF ligation.   -I am fine with him having AVF ligation. AVF placed by Dr. Mar previously.     # Mineral Bone Disorder:   - Secondary renal hyperparathyroidism; PTH level: Minimally elevated ( pg/ml)        On treatment: None, recheck   - Vitamin D; level: Normal        On supplement: Yes, holding due to previously high calcium  - Calcium; level: Normal        On supplement: No  - Phosphorus; level: Not checked recently        On supplement: No    # Electrolytes:   - Potassium; level: Normal        On supplement: No  - Magnesium; level: Not checked recently        On supplement: No  - Bicarbonate; level: Normal        On supplement: No    # Skin Cancer Risk:    - Discussed sun protection and recommend regular follow up with Dermatology.    # Medical Compliance: Yes  - Discussed importance of checking labs regularly as recommended, taking medications as prescribed and attending scheduled medical appointments.      # Health  Maintenance and Vaccination Review: Recommend:  COVID booster, PCV 20    # Transplant History:  Etiology of Kidney Failure: Unknown etiology  Tx: DDKT  Transplant: 5/3/2014 (Kidney)  Significant changes in immunosuppression: None  Significant transplant-related complications: None    Transplant Office Phone Number: [phone]    Assessment and plan was discussed with the patient and he voiced his understanding and agreement.    Return visit: Return in about 1 year (around 5/20/2025).    Roscoe Razo MD    The longitudinal plan of care for kidney transplant was addressed during this visit. Due to the added complexity in care, I will continue to support Vikram Wetzel in the subsequent management of this condition(s) and with the ongoing continuity of care of this condition(s).     Chief Complaint   Mr. Wetzel is a 79 year old here for kidney transplant, immunosuppression management and routine follow up.    History of Present Illness   The patient overall feels well. He denies any recent hospitalizations. He denies nausea, vomiting, diarrhea, fever, chills, shortness of breath, chest pain, LE edema, unintentional weight loss, nights sweats, dysuria, hematuria.       Home BP:  110-130s systolic    Problem List   Patient Active Problem List   Diagnosis    Leukopenia    Dyslipidemia    Immunosuppressed status (H24)    Kidney replaced by transplant    AV block    Aftercare following organ transplant    Rotator cuff syndrome, right    CKD (chronic kidney disease) stage 2, GFR 60-89 ml/min    Vitamin D deficiency    History of atrial fibrillation    HTN, kidney transplant related    Need for vaccination against respiratory syncytial virus    Wellness examination    H/O: Bell's palsy    COVID-19 vaccination declined       Allergies   No Known Allergies    Medications   Current Outpatient Medications   Medication Sig Dispense Refill    aspirin 81 MG tablet Take 1 tablet by mouth daily.      carvedilol (COREG) 3.125 MG  tablet Take 1 tablet (3.125 mg) by mouth 2 times daily (with meals) 180 tablet 3    CELLCEPT (BRAND) 250 MG capsule Take 2 capsules (500 mg) by mouth 2 times daily 120 capsule 11    PROGRAF (BRAND) 1 MG capsule Take 1 capsule (1 mg) by mouth 2 times daily 60 capsule 11     No current facility-administered medications for this visit.     There are no discontinued medications.      Physical Exam   Vital Signs: BP (!) 173/82   Pulse 68   Temp 97.7  F (36.5  C) (Oral)   Wt 73.7 kg (162 lb 6.4 oz)   SpO2 100%   BMI 24.69 kg/m    GENERAL APPEARANCE: alert and no distress  HENT: mouth without ulcers or lesions  LYMPHATICS: no cervical or supraclavicular nodes  RESP: lungs clear to auscultation - no rales, rhonchi or wheezes  CV: regular rhythm, normal rate, no rub, no murmur  EDEMA: no LE edema bilaterally  ABDOMEN: soft, nondistended, nontender, bowel sounds normal  MS: extremities normal - no gross deformities noted, no evidence of inflammation in joints, no muscle tenderness  SKIN: no rash  NEURO: normal strength and tone, sensory exam grossly normal, mentation intact and speech normal  PSYCH: mentation appears normal and affect normal/bright  TX KIDNEY: normal  DIALYSIS ACCESS:  LUE AV fistula aneurysmal with good thrill        Data         Latest Ref Rng & Units 5/1/2024     7:52 AM 2/7/2024     7:47 AM 11/8/2023     9:35 AM   Renal   Sodium 135 - 145 mmol/L 139  136  134    K 3.4 - 5.3 mmol/L 4.5  4.8  5.1    Cl 98 - 107 mmol/L 106  104  101    Cl (external) 98 - 107 mmol/L 106  104  101    CO2 22 - 29 mmol/L 26  26  24    Urea Nitrogen 8.0 - 23.0 mg/dL 23.1  19.3  12.5    Creatinine 0.67 - 1.17 mg/dL 1.24  1.16  1.09    Glucose 70 - 99 mg/dL 80  94  103    Calcium 8.8 - 10.2 mg/dL 9.9  9.9  9.8          Latest Ref Rng & Units 11/8/2023     9:35 AM 8/2/2023     7:24 AM 2/2/2022     7:55 AM   Bone Health   Parathyroid Hormone Intact 18 - 80 pg/mL   72    Vit D Def 20 - 50 ng/mL 25  30  13          Latest Ref Rng  & Units 5/1/2024     7:52 AM 2/7/2024     7:47 AM 11/8/2023     9:35 AM   Heme   WBC 4.0 - 11.0 10e3/uL 2.9  2.7  2.2  C   Hgb 13.3 - 17.7 g/dL 13.6  12.4  12.5    Plt 150 - 450 10e3/uL 128  131  179  C      C Corrected result         Latest Ref Rng & Units 10/29/2014     7:58 AM 10/1/2014     8:24 AM 5/3/2014     4:56 PM   Liver   AP 40 - 150 U/L 132  122  92    TBili 0.2 - 1.3 mg/dL 0.6  0.4  0.3    Bilirubin Direct 0.0 - 0.2 mg/dL 0.1  0.1     ALT 0 - 70 U/L 24  29  25    AST 0 - 45 U/L 20  20  27    Tot Protein 6.8 - 8.8 g/dL 7.3  6.7  7.1    Albumin 3.4 - 5.0 g/dL 3.8  3.4  3.9          Latest Ref Rng & Units 5/5/2014     6:28 AM 5/3/2014     4:56 PM   Pancreas   A1C 4.3 - 6.0 % 5.6  5.2          Latest Ref Rng & Units 7/1/2015     7:57 AM 12/3/2014     8:04 AM 5/6/2014     5:57 AM   Iron studies   Iron 35 - 180 ug/dL 69  37  45    Iron Saturation Index 15 - 46 % 21  12  20          Latest Ref Rng & Units 2/5/2020     8:03 AM 8/7/2019     8:08 AM 6/6/2018     8:02 AM   [facility] Txp Virology   BK Spec  Plasma  Plasma  Plasma    BK Res BKNEG^BK Virus DNA Not Detected copies/mL BK Virus DNA Not Detected  BK Virus DNA Not Detected  BK Virus DNA Not Detected    BK Log <2.7 Log copies/mL Not Calculated  Not Calculated  Not Calculated      Failed to redirect to the Timeline version of the REVFS SmartLink.  Recent Labs   Lab Test 11/08/23  0935 02/07/24  0747 05/01/24  0752   DOSTAC 11/7/2023 2/6/2024 4/30/2024   TACROL 4.3* 4.2* 4.1*     Recent Labs   Lab Test 04/06/16  0805   DOSMPA 2000 04/05/16   MPACID 2.32   MPAG 18.8*

## 2024-05-20 NOTE — LETTER
5/20/2024         RE: Vikram Wetzel  [address] [place] [address]        Dear Colleague,    Thank you for referring your patient, Vikram Wetzel, to the [facility]. Please see a copy of my visit note below.      TRANSPLANT NEPHROLOGY CHRONIC POST TRANSPLANT VISIT    Assessment & Plan  # DDKT: Stable   - Baseline Creatinine:  ~ 1.1-1.3   - Proteinuria: Normal (<0.2 grams)   - Date DSA Last Checked: Feb/2020      Latest DSA: No   - BK Viremia: Not checked recently due to time from transplant   - Kidney Tx Biopsy: No    - Labs every 3 months    # Immunosuppression: Tacrolimus immediate release (goal 4-6) and Mycophenolate mofetil (dose 500 mg every 12 hours)   - Continue with intensive monitoring of immunosuppression for efficacy and toxicity.   - Changes: Not at this time    # Infection Prophylaxis:   - PJP: None   Last CD4 Level: 249 (Feb/2024)    # Hypertension:  Per patient is variable ;  Goal BP: < 130/80   - Changes: Not at this time. Continue carvedilol 3.125mg bid.     # Anemia in Chronic Renal Disease: Hgb: Stable, near normal      VICTOR M: No   - Iron studies: Not checked recently    # Aneurysmal LUE AVF:   -No pain, has good thrill but patient thinks it is unsightly. Wants to think about AVF ligation.   -I am fine with him having AVF ligation. AVF placed by Dr. Mar previously.     # Mineral Bone Disorder:   - Secondary renal hyperparathyroidism; PTH level: Minimally elevated ( pg/ml)        On treatment: None, recheck   - Vitamin D; level: Normal        On supplement: Yes, holding due to previously high calcium  - Calcium; level: Normal        On supplement: No  - Phosphorus; level: Not checked recently        On supplement: No    # Electrolytes:   - Potassium; level: Normal        On supplement: No  - Magnesium; level: Not checked recently        On supplement: No  - Bicarbonate; level: Normal        On supplement: No    # Skin Cancer Risk:    - Discussed  sun protection and recommend regular follow up with Dermatology.    # Medical Compliance: Yes  - Discussed importance of checking labs regularly as recommended, taking medications as prescribed and attending scheduled medical appointments.      # Health Maintenance and Vaccination Review: Recommend:  COVID booster, PCV 20    # Transplant History:  Etiology of Kidney Failure: Unknown etiology  Tx: DDKT  Transplant: 5/3/2014 (Kidney)  Significant changes in immunosuppression: None  Significant transplant-related complications: None    Transplant Office Phone Number: [phone]    Assessment and plan was discussed with the patient and he voiced his understanding and agreement.    Return visit: Return in about 1 year (around 5/20/2025).    Roscoe Razo MD    The longitudinal plan of care for kidney transplant was addressed during this visit. Due to the added complexity in care, I will continue to support Vikram Wetzel in the subsequent management of this condition(s) and with the ongoing continuity of care of this condition(s).     Chief Complaint  Mr. Wetzel is a 79 year old here for kidney transplant, immunosuppression management and routine follow up.    History of Present Illness  The patient overall feels well. He denies any recent hospitalizations. He denies nausea, vomiting, diarrhea, fever, chills, shortness of breath, chest pain, LE edema, unintentional weight loss, nights sweats, dysuria, hematuria.       Home BP:  110-130s systolic    Problem List  Patient Active Problem List   Diagnosis     Leukopenia     Dyslipidemia     Immunosuppressed status (H24)     Kidney replaced by transplant     AV block     Aftercare following organ transplant     Rotator cuff syndrome, right     CKD (chronic kidney disease) stage 2, GFR 60-89 ml/min     Vitamin D deficiency     History of atrial fibrillation     HTN, kidney transplant related     Need for vaccination against respiratory syncytial virus     Wellness  examination     H/O: Bell's palsy     COVID-19 vaccination declined       Allergies  No Known Allergies    Medications  Current Outpatient Medications   Medication Sig Dispense Refill     aspirin 81 MG tablet Take 1 tablet by mouth daily.       carvedilol (COREG) 3.125 MG tablet Take 1 tablet (3.125 mg) by mouth 2 times daily (with meals) 180 tablet 3     CELLCEPT (BRAND) 250 MG capsule Take 2 capsules (500 mg) by mouth 2 times daily 120 capsule 11     PROGRAF (BRAND) 1 MG capsule Take 1 capsule (1 mg) by mouth 2 times daily 60 capsule 11     No current facility-administered medications for this visit.     There are no discontinued medications.      Physical Exam  Vital Signs: BP (!) 173/82   Pulse 68   Temp 97.7  F (36.5  C) (Oral)   Wt 73.7 kg (162 lb 6.4 oz)   SpO2 100%   BMI 24.69 kg/m    GENERAL APPEARANCE: alert and no distress  HENT: mouth without ulcers or lesions  LYMPHATICS: no cervical or supraclavicular nodes  RESP: lungs clear to auscultation - no rales, rhonchi or wheezes  CV: regular rhythm, normal rate, no rub, no murmur  EDEMA: no LE edema bilaterally  ABDOMEN: soft, nondistended, nontender, bowel sounds normal  MS: extremities normal - no gross deformities noted, no evidence of inflammation in joints, no muscle tenderness  SKIN: no rash  NEURO: normal strength and tone, sensory exam grossly normal, mentation intact and speech normal  PSYCH: mentation appears normal and affect normal/bright  TX KIDNEY: normal  DIALYSIS ACCESS:  LUE AV fistula aneurysmal with good thrill        Data        Latest Ref Rng & Units 5/1/2024     7:52 AM 2/7/2024     7:47 AM 11/8/2023     9:35 AM   Renal   Sodium 135 - 145 mmol/L 139  136  134    K 3.4 - 5.3 mmol/L 4.5  4.8  5.1    Cl 98 - 107 mmol/L 106  104  101    Cl (external) 98 - 107 mmol/L 106  104  101    CO2 22 - 29 mmol/L 26  26  24    Urea Nitrogen 8.0 - 23.0 mg/dL 23.1  19.3  12.5    Creatinine 0.67 - 1.17 mg/dL 1.24  1.16  1.09    Glucose 70 - 99 mg/dL  80  94  103    Calcium 8.8 - 10.2 mg/dL 9.9  9.9  9.8          Latest Ref Rng & Units 11/8/2023     9:35 AM 8/2/2023     7:24 AM 2/2/2022     7:55 AM   Bone Health   Parathyroid Hormone Intact 18 - 80 pg/mL   72    Vit D Def 20 - 50 ng/mL 25  30  13          Latest Ref Rng & Units 5/1/2024     7:52 AM 2/7/2024     7:47 AM 11/8/2023     9:35 AM   Heme   WBC 4.0 - 11.0 10e3/uL 2.9  2.7  2.2  C   Hgb 13.3 - 17.7 g/dL 13.6  12.4  12.5    Plt 150 - 450 10e3/uL 128  131  179  C      C Corrected result         Latest Ref Rng & Units 10/29/2014     7:58 AM 10/1/2014     8:24 AM 5/3/2014     4:56 PM   Liver   AP 40 - 150 U/L 132  122  92    TBili 0.2 - 1.3 mg/dL 0.6  0.4  0.3    Bilirubin Direct 0.0 - 0.2 mg/dL 0.1  0.1     ALT 0 - 70 U/L 24  29  25    AST 0 - 45 U/L 20  20  27    Tot Protein 6.8 - 8.8 g/dL 7.3  6.7  7.1    Albumin 3.4 - 5.0 g/dL 3.8  3.4  3.9          Latest Ref Rng & Units 5/5/2014     6:28 AM 5/3/2014     4:56 PM   Pancreas   A1C 4.3 - 6.0 % 5.6  5.2          Latest Ref Rng & Units 7/1/2015     7:57 AM 12/3/2014     8:04 AM 5/6/2014     5:57 AM   Iron studies   Iron 35 - 180 ug/dL 69  37  45    Iron Saturation Index 15 - 46 % 21  12  20          Latest Ref Rng & Units 2/5/2020     8:03 AM 8/7/2019     8:08 AM 6/6/2018     8:02 AM   [facility] Txp Virology   BK Spec  Plasma  Plasma  Plasma    BK Res BKNEG^BK Virus DNA Not Detected copies/mL BK Virus DNA Not Detected  BK Virus DNA Not Detected  BK Virus DNA Not Detected    BK Log <2.7 Log copies/mL Not Calculated  Not Calculated  Not Calculated      Failed to redirect to the Timeline version of the REVFS SmartLink.  Recent Labs   Lab Test 11/08/23  0935 02/07/24  0747 05/01/24  0752   DOSTAC 11/7/2023 2/6/2024 4/30/2024   TACROL 4.3* 4.2* 4.1*     Recent Labs   Lab Test 04/06/16  0805   DOSMPA 2000 04/05/16   MPACID 2.32   MPAG 18.8*         Again, thank you for allowing me to participate in the care of your patient.        Sincerely,        Roscoe Razo MD

## 2024-05-21 ENCOUNTER — TELEPHONE (OUTPATIENT)
Dept: TRANSPLANT | Facility: CLINIC | Age: 79
End: 2024-05-21
Payer: MEDICARE

## 2024-05-21 DIAGNOSIS — Z48.298 AFTERCARE FOLLOWING ORGAN TRANSPLANT: Primary | ICD-10-CM

## 2024-05-21 DIAGNOSIS — Z94.0 KIDNEY REPLACED BY TRANSPLANT: ICD-10-CM

## 2024-05-21 NOTE — TELEPHONE ENCOUNTER
----- Message from Roscoe Razo MD sent at 5/20/2024  5:27 PM CDT -----  Regarding: plan  With next labs please obtain PTH. I am fine with him getting NORA PUCKETT ligated. He had it done by Dr. Mar and may call his office to see him.     Thanks,  Roscoe

## 2024-08-07 ENCOUNTER — LAB (OUTPATIENT)
Dept: LAB | Facility: CLINIC | Age: 79
End: 2024-08-07
Payer: MEDICARE

## 2024-08-07 DIAGNOSIS — Z94.0 KIDNEY REPLACED BY TRANSPLANT: ICD-10-CM

## 2024-08-07 DIAGNOSIS — Z48.298 AFTERCARE FOLLOWING ORGAN TRANSPLANT: ICD-10-CM

## 2024-08-07 DIAGNOSIS — Z94.0 KIDNEY TRANSPLANTED: ICD-10-CM

## 2024-08-07 LAB
ANION GAP SERPL CALCULATED.3IONS-SCNC: 10 MMOL/L (ref 7–15)
BUN SERPL-MCNC: 21.2 MG/DL (ref 8–23)
CALCIUM SERPL-MCNC: 9.5 MG/DL (ref 8.8–10.4)
CHLORIDE SERPL-SCNC: 104 MMOL/L (ref 98–107)
CREAT SERPL-MCNC: 1.27 MG/DL (ref 0.67–1.17)
EGFRCR SERPLBLD CKD-EPI 2021: 58 ML/MIN/1.73M2
ERYTHROCYTE [DISTWIDTH] IN BLOOD BY AUTOMATED COUNT: 12.9 % (ref 10–15)
GLUCOSE SERPL-MCNC: 92 MG/DL (ref 70–99)
HCO3 SERPL-SCNC: 23 MMOL/L (ref 22–29)
HCT VFR BLD AUTO: 41.2 % (ref 40–53)
HGB BLD-MCNC: 13.3 G/DL (ref 13.3–17.7)
MCH RBC QN AUTO: 28.6 PG (ref 26.5–33)
MCHC RBC AUTO-ENTMCNC: 32.3 G/DL (ref 31.5–36.5)
MCV RBC AUTO: 89 FL (ref 78–100)
PLATELET # BLD AUTO: 131 10E3/UL (ref 150–450)
POTASSIUM SERPL-SCNC: 4.6 MMOL/L (ref 3.4–5.3)
PTH-INTACT SERPL-MCNC: 60 PG/ML (ref 15–65)
RBC # BLD AUTO: 4.65 10E6/UL (ref 4.4–5.9)
SODIUM SERPL-SCNC: 137 MMOL/L (ref 135–145)
TACROLIMUS BLD-MCNC: 5.1 UG/L (ref 5–15)
TME LAST DOSE: NORMAL H
TME LAST DOSE: NORMAL H
WBC # BLD AUTO: 3.1 10E3/UL (ref 4–11)

## 2024-08-07 PROCEDURE — 85027 COMPLETE CBC AUTOMATED: CPT

## 2024-08-07 PROCEDURE — 36415 COLL VENOUS BLD VENIPUNCTURE: CPT

## 2024-08-07 PROCEDURE — 80048 BASIC METABOLIC PNL TOTAL CA: CPT

## 2024-08-07 PROCEDURE — 80197 ASSAY OF TACROLIMUS: CPT

## 2024-08-07 PROCEDURE — 83970 ASSAY OF PARATHORMONE: CPT

## 2024-09-17 DIAGNOSIS — Z94.0 KIDNEY REPLACED BY TRANSPLANT: Primary | ICD-10-CM

## 2024-11-06 ENCOUNTER — LAB (OUTPATIENT)
Dept: LAB | Facility: CLINIC | Age: 79
End: 2024-11-06
Payer: MEDICARE

## 2024-11-06 DIAGNOSIS — Z94.0 KIDNEY TRANSPLANTED: ICD-10-CM

## 2024-11-06 LAB
ALBUMIN MFR UR ELPH: <6 MG/DL
ANION GAP SERPL CALCULATED.3IONS-SCNC: 8 MMOL/L (ref 7–15)
BUN SERPL-MCNC: 22.1 MG/DL (ref 8–23)
CALCIUM SERPL-MCNC: 9.7 MG/DL (ref 8.8–10.4)
CHLORIDE SERPL-SCNC: 103 MMOL/L (ref 98–107)
CREAT SERPL-MCNC: 1.22 MG/DL (ref 0.67–1.17)
CREAT UR-MCNC: 27.2 MG/DL
EGFRCR SERPLBLD CKD-EPI 2021: 61 ML/MIN/1.73M2
ERYTHROCYTE [DISTWIDTH] IN BLOOD BY AUTOMATED COUNT: 12.4 % (ref 10–15)
GLUCOSE SERPL-MCNC: 103 MG/DL (ref 70–99)
HCO3 SERPL-SCNC: 25 MMOL/L (ref 22–29)
HCT VFR BLD AUTO: 43.5 % (ref 40–53)
HGB BLD-MCNC: 14.2 G/DL (ref 13.3–17.7)
MCH RBC QN AUTO: 28.9 PG (ref 26.5–33)
MCHC RBC AUTO-ENTMCNC: 32.6 G/DL (ref 31.5–36.5)
MCV RBC AUTO: 89 FL (ref 78–100)
PLATELET # BLD AUTO: 126 10E3/UL (ref 150–450)
POTASSIUM SERPL-SCNC: 4.8 MMOL/L (ref 3.4–5.3)
PROT/CREAT 24H UR: NORMAL MG/G{CREAT}
RBC # BLD AUTO: 4.91 10E6/UL (ref 4.4–5.9)
SODIUM SERPL-SCNC: 136 MMOL/L (ref 135–145)
TACROLIMUS BLD-MCNC: 5 UG/L (ref 5–15)
TME LAST DOSE: NORMAL H
TME LAST DOSE: NORMAL H
WBC # BLD AUTO: 2.9 10E3/UL (ref 4–11)

## 2024-11-06 PROCEDURE — 80048 BASIC METABOLIC PNL TOTAL CA: CPT

## 2024-11-06 PROCEDURE — 84156 ASSAY OF PROTEIN URINE: CPT

## 2024-11-06 PROCEDURE — 80197 ASSAY OF TACROLIMUS: CPT

## 2024-11-06 PROCEDURE — 36415 COLL VENOUS BLD VENIPUNCTURE: CPT

## 2024-11-06 PROCEDURE — 85027 COMPLETE CBC AUTOMATED: CPT

## 2024-11-12 DIAGNOSIS — I15.1 HTN, KIDNEY TRANSPLANT RELATED: ICD-10-CM

## 2024-11-12 DIAGNOSIS — Z94.0 KIDNEY TRANSPLANTED: ICD-10-CM

## 2024-11-12 DIAGNOSIS — Z94.0 HTN, KIDNEY TRANSPLANT RELATED: ICD-10-CM

## 2024-11-19 RX ORDER — MYCOPHENOLATE MOFETIL 250 MG/1
500 CAPSULE ORAL 2 TIMES DAILY
Qty: 120 CAPSULE | Refills: 11 | Status: SHIPPED | OUTPATIENT
Start: 2024-11-19

## 2025-01-23 ENCOUNTER — PATIENT OUTREACH (OUTPATIENT)
Dept: CARE COORDINATION | Facility: CLINIC | Age: 80
End: 2025-01-23
Payer: MEDICARE

## 2025-02-05 ENCOUNTER — LAB (OUTPATIENT)
Dept: LAB | Facility: CLINIC | Age: 80
End: 2025-02-05
Payer: MEDICARE

## 2025-02-05 DIAGNOSIS — N18.2 CKD (CHRONIC KIDNEY DISEASE) STAGE 2, GFR 60-89 ML/MIN: Primary | ICD-10-CM

## 2025-02-05 DIAGNOSIS — Z94.0 KIDNEY TRANSPLANTED: ICD-10-CM

## 2025-02-05 LAB
ANION GAP SERPL CALCULATED.3IONS-SCNC: 8 MMOL/L (ref 7–15)
BUN SERPL-MCNC: 21 MG/DL (ref 8–23)
CALCIUM SERPL-MCNC: 9.9 MG/DL (ref 8.8–10.4)
CHLORIDE SERPL-SCNC: 104 MMOL/L (ref 98–107)
CREAT SERPL-MCNC: 1.32 MG/DL (ref 0.67–1.17)
EGFRCR SERPLBLD CKD-EPI 2021: 56 ML/MIN/1.73M2
ERYTHROCYTE [DISTWIDTH] IN BLOOD BY AUTOMATED COUNT: 12.2 % (ref 10–15)
GLUCOSE SERPL-MCNC: 98 MG/DL (ref 70–99)
HCO3 SERPL-SCNC: 25 MMOL/L (ref 22–29)
HCT VFR BLD AUTO: 43.1 % (ref 40–53)
HGB BLD-MCNC: 14 G/DL (ref 13.3–17.7)
MCH RBC QN AUTO: 28.3 PG (ref 26.5–33)
MCHC RBC AUTO-ENTMCNC: 32.5 G/DL (ref 31.5–36.5)
MCV RBC AUTO: 87 FL (ref 78–100)
PLATELET # BLD AUTO: 142 10E3/UL (ref 150–450)
POTASSIUM SERPL-SCNC: 4.7 MMOL/L (ref 3.4–5.3)
RBC # BLD AUTO: 4.95 10E6/UL (ref 4.4–5.9)
SODIUM SERPL-SCNC: 137 MMOL/L (ref 135–145)
TACROLIMUS BLD-MCNC: 4.4 UG/L (ref 5–15)
TME LAST DOSE: ABNORMAL H
TME LAST DOSE: ABNORMAL H
WBC # BLD AUTO: 3 10E3/UL (ref 4–11)

## 2025-02-05 PROCEDURE — 80048 BASIC METABOLIC PNL TOTAL CA: CPT

## 2025-02-05 PROCEDURE — 85027 COMPLETE CBC AUTOMATED: CPT

## 2025-02-05 PROCEDURE — 80197 ASSAY OF TACROLIMUS: CPT

## 2025-02-05 PROCEDURE — 36415 COLL VENOUS BLD VENIPUNCTURE: CPT

## 2025-03-15 SDOH — HEALTH STABILITY: PHYSICAL HEALTH: ON AVERAGE, HOW MANY DAYS PER WEEK DO YOU ENGAGE IN MODERATE TO STRENUOUS EXERCISE (LIKE A BRISK WALK)?: 4 DAYS

## 2025-03-15 SDOH — HEALTH STABILITY: PHYSICAL HEALTH: ON AVERAGE, HOW MANY MINUTES DO YOU ENGAGE IN EXERCISE AT THIS LEVEL?: 60 MIN

## 2025-03-15 ASSESSMENT — SOCIAL DETERMINANTS OF HEALTH (SDOH): HOW OFTEN DO YOU GET TOGETHER WITH FRIENDS OR RELATIVES?: ONCE A WEEK

## 2025-03-20 ENCOUNTER — OFFICE VISIT (OUTPATIENT)
Dept: FAMILY MEDICINE | Facility: CLINIC | Age: 80
End: 2025-03-20
Payer: MEDICARE

## 2025-03-20 VITALS
HEART RATE: 70 BPM | BODY MASS INDEX: 24.28 KG/M2 | HEIGHT: 68 IN | WEIGHT: 160.2 LBS | DIASTOLIC BLOOD PRESSURE: 82 MMHG | OXYGEN SATURATION: 97 % | TEMPERATURE: 98.2 F | RESPIRATION RATE: 18 BRPM | SYSTOLIC BLOOD PRESSURE: 148 MMHG

## 2025-03-20 DIAGNOSIS — N18.31 CHRONIC KIDNEY DISEASE, STAGE 3A (H): ICD-10-CM

## 2025-03-20 DIAGNOSIS — Z94.0 KIDNEY REPLACED BY TRANSPLANT: ICD-10-CM

## 2025-03-20 DIAGNOSIS — Z00.00 ENCOUNTER FOR MEDICARE ANNUAL WELLNESS EXAM: Primary | ICD-10-CM

## 2025-03-20 DIAGNOSIS — N18.2 CKD (CHRONIC KIDNEY DISEASE) STAGE 2, GFR 60-89 ML/MIN: ICD-10-CM

## 2025-03-20 DIAGNOSIS — Z86.79 HISTORY OF ATRIAL FIBRILLATION: ICD-10-CM

## 2025-03-20 PROBLEM — T82.898A: Status: RESOLVED | Noted: 2024-05-20 | Resolved: 2025-03-20

## 2025-03-20 PROBLEM — E55.9 VITAMIN D DEFICIENCY: Status: RESOLVED | Noted: 2022-02-02 | Resolved: 2025-03-20

## 2025-03-20 PROBLEM — I15.1 HTN, KIDNEY TRANSPLANT RELATED: Status: RESOLVED | Noted: 2023-02-21 | Resolved: 2025-03-20

## 2025-03-20 PROBLEM — Z48.298 AFTERCARE FOLLOWING ORGAN TRANSPLANT: Status: RESOLVED | Noted: 2017-05-08 | Resolved: 2025-03-20

## 2025-03-20 RX ORDER — CARVEDILOL 3.12 MG/1
3.12 TABLET ORAL 2 TIMES DAILY WITH MEALS
Qty: 180 TABLET | Refills: 3 | Status: SHIPPED | OUTPATIENT
Start: 2025-03-20

## 2025-03-20 NOTE — PATIENT INSTRUCTIONS
Patient Education   Preventive Care Advice   This is general advice given by our system to help you stay healthy. However, your care team may have specific advice just for you. Please talk to your care team about your preventive care needs.  Nutrition  Eat 5 or more servings of fruits and vegetables each day.  Try wheat bread, brown rice and whole grain pasta (instead of white bread, rice, and pasta).  Get enough calcium and vitamin D. Check the label on foods and aim for 100% of the RDA (recommended daily allowance).  Lifestyle  Exercise at least 150 minutes each week  (30 minutes a day, 5 days a week).  Do muscle strengthening activities 2 days a week. These help control your weight and prevent disease.  No smoking.  Wear sunscreen to prevent skin cancer.  Have a dental exam and cleaning every 6 months.  Yearly exams  See your health care team every year to talk about:  Any changes in your health.  Any medicines your care team has prescribed.  Preventive care, family planning, and ways to prevent chronic diseases.  Shots (vaccines)   HPV shots (up to age 26), if you've never had them before.  Hepatitis B shots (up to age 59), if you've never had them before.  COVID-19 shot: Get this shot when it's due.  Flu shot: Get a flu shot every year.  Tetanus shot: Get a tetanus shot every 10 years.  Pneumococcal, hepatitis A, and RSV shots: Ask your care team if you need these based on your risk.  Shingles shot (for age 50 and up)  General health tests  Diabetes screening:  Starting at age 35, Get screened for diabetes at least every 3 years.  If you are younger than age 35, ask your care team if you should be screened for diabetes.  Cholesterol test: At age 39, start having a cholesterol test every 5 years, or more often if advised.  Bone density scan (DEXA): At age 50, ask your care team if you should have this scan for osteoporosis (brittle bones).  Hepatitis C: Get tested at least once in your life.  STIs (sexually  transmitted infections)  Before age 24: Ask your care team if you should be screened for STIs.  After age 24: Get screened for STIs if you're at risk. You are at risk for STIs (including HIV) if:  You are sexually active with more than one person.  You don't use condoms every time.  You or a partner was diagnosed with a sexually transmitted infection.  If you are at risk for HIV, ask about PrEP medicine to prevent HIV.  Get tested for HIV at least once in your life, whether you are at risk for HIV or not.  Cancer screening tests  Cervical cancer screening: If you have a cervix, begin getting regular cervical cancer screening tests starting at age 21.  Breast cancer scan (mammogram): If you've ever had breasts, begin having regular mammograms starting at age 40. This is a scan to check for breast cancer.  Colon cancer screening: It is important to start screening for colon cancer at age 45.  Have a colonoscopy test every 10 years (or more often if you're at risk) Or, ask your provider about stool tests like a FIT test every year or Cologuard test every 3 years.  To learn more about your testing options, visit:   .  For help making a decision, visit:   https://bit.ly/ij22934.  Prostate cancer screening test: If you have a prostate, ask your care team if a prostate cancer screening test (PSA) at age 55 is right for you.  Lung cancer screening: If you are a current or former smoker ages 50 to 80, ask your care team if ongoing lung cancer screenings are right for you.  For informational purposes only. Not to replace the advice of your health care provider. Copyright   2023 [facility] [employer]. All rights reserved. Clinically reviewed by the [facility]. [employer] [id number] - REV 01/24.  Hearing Loss: Care Instructions  Overview     Hearing loss is a sudden or slow decrease in how well you hear. It can range from slight to profound. Permanent hearing loss can occur with aging. It also can  happen when you are exposed long-term to loud noise. Examples include listening to loud music, riding motorcycles, or being around other loud machines.  Hearing loss can affect your work and home life. It can make you feel lonely or depressed. You may feel that you have lost your independence. But hearing aids and other devices can help you hear better and feel connected to others.  Follow-up care is a key part of your treatment and safety. Be sure to make and go to all appointments, and call your doctor if you are having problems. It's also a good idea to know your test results and keep a list of the medicines you take.  How can you care for yourself at home?  Avoid loud noises whenever possible. This helps keep your hearing from getting worse.  Always wear hearing protection around loud noises.  Wear a hearing aid as directed.  A professional can help you pick a hearing aid that will work best for you.  You can also get hearing aids over the counter for mild to moderate hearing loss.  Have hearing tests as your doctor suggests. They can show whether your hearing has changed. Your hearing aid may need to be adjusted.  Use other devices as needed. These may include:  Telephone amplifiers and hearing aids that can connect to a television, stereo, radio, or microphone.  Devices that use lights or vibrations. These alert you to the doorbell, a ringing telephone, or a baby monitor.  Television closed-captioning. This shows the words at the bottom of the screen. Most new TVs can do this.  TTY (text telephone). This lets you type messages back and forth on the telephone instead of talking or listening. These devices are also called TDD. When messages are typed on the keyboard, they are sent over the phone line to a receiving TTY. The message is shown on a monitor.  Use text messaging, social media, and email if it is hard for you to communicate by telephone.  Try to learn a listening technique called speechreading. It is  "not lipreading. You pay attention to people's gestures, expressions, posture, and tone of voice. These clues can help you understand what a person is saying. Face the person you are talking to, and have them face you. Make sure the lighting is good. You need to see the other person's face clearly.  Think about counseling if you need help to adjust to your hearing loss.  When should you call for help?  Watch closely for changes in your health, and be sure to contact your doctor if:    You think your hearing is getting worse.     You have new symptoms, such as dizziness or nausea.   Where can you learn more?  Go to https://www.[employer].net/patiented  Enter R798 in the search box to learn more about \"Hearing Loss: Care Instructions.\"  Current as of: October 27, 2024  Content Version: 14.4    0446-2214 "[employer]   Care instructions adapted under license by your healthcare professional. If you have questions about a medical condition or this instruction, always ask your healthcare professional. "[employer]" disclaims any warranty or liability for your use of this information.       "

## 2025-03-20 NOTE — ASSESSMENT & PLAN NOTE
Remote history of kidney transplant, pt has been having palpitation that thoughts to be related to PAC.  Palpitations once a month last fr 1 to 2 day then it goes away.  Controlled on carvedilol 3.125 mg twice daily.

## 2025-03-20 NOTE — PROGRESS NOTES
Preventive Care Visit  [facility]  Lupe Villarreal MD, Family Medicine  Mar 20, 2025      Assessment & Plan  CKD (chronic kidney disease) stage 2, GFR 60-89 ml/min  - Chronic kidney disease stage 2 with stable kidney function post-transplant. Creatinine level at 1.32, slightly above normal but significantly improved from pre-transplant levels.  - Continue current immunosuppressive therapy with CellCept and Prograf. Follow-up with transplant specialist at [place], [facility] visit scheduled for May 27, 2025.    History of atrial fibrillation  - Remote history of atrial fibrillation, currently experiencing premature atrial contractions (PACs) and premature ventricular contractions (PVCs). No current evidence of atrial fibrillation.  - Apply Zio patch for heart monitoring. Referral to cardiologist for further evaluation of arrhythmia history and potential need for blood thinner. Continue carvedilol 3.125 mg twice daily.    Chronic kidney disease, stage 3a (H)  - Chronic kidney disease stage 3a noted, but current kidney function is stable post-transplant.  - Continue monitoring kidney function through regular lab tests every three months. Follow-up with transplant specialist.    Kidney replaced by transplant  - Kidney transplant performed on May 4, 2014, with good post-transplant function.  - Continue current immunosuppressive regimen. Follow-up with transplant specialist as scheduled.    Encounter for Medicare annual wellness exam  - Annual wellness exam conducted, with focus on monitoring chronic conditions and medication management.  - Continue current management strategies for chronic conditions. Monitor blood pressure regularly at home and report to transplant specialist if consistently high.        Miki Sue is a 79 year old, presenting for the following:  Wellness Visit (Not fasring)        3/20/2025     7:02 AM   Additional Questions   Roomed by Marcela   Accompanied by Self            HPI  History of Present Illness-- Vikram Wetzel, a 79-year-old male, reported waking up with an irregular heartbeat, which he is accustomed to due to a history of premature ventricular contractions (PVCs) following a kidney transplant. He mentioned that he first experienced atrial fibrillation (Afib) the day after his kidney surgery, which resolved on its own and has not been a persistent issue. He underwent heart monitoring approximately 7-9 years ago at [facility], which identified premature atrial contractions (PACs) or PVCs. The last consultation with a heart doctor was in 2016, who noted the presence of PACs. Currently, he is on carvedilol, prescribed for blood pressure management and arrhythmia control. He first experienced Afib while undergoing dialysis prior to the kidney transplant. He reports palpitations occurring once a month, lasting from a few hours to two days, and is currently experiencing palpitations at the time of the encounter. He had a kidney transplant on May 4, 2014, and has been doing well since, with good blood results. He takes CellCept (500 mg twice daily) and Prograf (1 mg twice daily) for transplant immunosuppression. He follows up with a transplant clinic at [place], with a new specialist he has not yet met. No issues are reported with the fistula in the left arm, though he cannot sleep on that side. Blood pressure readings at home have been high, around 140-150 mmHg systolic.           Advance Care Planning  Patient has a Health Care Directive on file        3/15/2025   General Health   How would you rate your overall physical health? Good   Feel stress (tense, anxious, or unable to sleep) Not at all         3/15/2025   Nutrition   Diet: Regular (no restrictions)         3/15/2025   Exercise   Days per week of moderate/strenous exercise 4 days   Average minutes spent exercising at this level 60 min         3/15/2025   Social Factors   Frequency of gathering with  friends or relatives Once a week   Worry food won't last until get money to buy more No   Food not last or not have enough money for food? No   Do you have housing? (Housing is defined as stable permanent housing and does not include staying ouside in a car, in a tent, in an abandoned building, in an overnight shelter, or couch-surfing.) Yes   Are you worried about losing your housing? No   Lack of transportation? No   Unable to get utilities (heat,electricity)? No         3/15/2025   Fall Risk   Fallen 2 or more times in the past year? No   Trouble with walking or balance? No          3/15/2025   Activities of Daily Living- Home Safety   Needs help with the following daily activites None of the above   Safety concerns in the home None of the above         3/15/2025   Dental   Dentist two times every year? Yes         3/15/2025   Hearing Screening   Hearing concerns? (!) IT'S HARD TO FOLLOW A CONVERSATION IN A NOISY RESTAURANT OR CROWDED ROOM.         3/15/2025   Driving Risk Screening   Patient/family members have concerns about driving No         3/15/2025   General Alertness/Fatigue Screening   Have you been more tired than usual lately? No         3/15/2025   Urinary Incontinence Screening   Bothered by leaking urine in past 6 months No           2/16/2024   TB Screening   Were you born outside of the [place]? (!) YES             Today's PHQ-2 Score:       3/20/2025     7:08 AM   PHQ-2 ( 1999 Pfizer)   Q1: Little interest or pleasure in doing things 0   Q2: Feeling down, depressed or hopeless 0   PHQ-2 Score 0           3/15/2025   Substance Use   Alcohol more than 3/day or more than 7/wk Not Applicable   Do you have a current opioid prescription? No   How severe/bad is pain from 1 to 10? 0/10 (No Pain)   Do you use any other substances recreationally? No     Social History     Tobacco Use    Smoking status: Never    Smokeless tobacco: Never   Vaping Use    Vaping status: Never Used   Substance Use Topics    Alcohol  use: No     Alcohol/week: 0.0 standard drinks of alcohol    Drug use: No       ASCVD Risk   The 10-year ASCVD risk score (Poli BROWNING, et al., 2019) is: 31.9%    Values used to calculate the score:      Age: 79 years      Sex: Male      Is Non- : No      Diabetic: No      Tobacco smoker: No      Systolic Blood Pressure: 147 mmHg      Is BP treated: No      HDL Cholesterol: 56 mg/dL      Total Cholesterol: 170 mg/dL            Reviewed and updated as needed this visit by Provider                    Past Medical History:   Diagnosis Date    Anemia in chronic kidney disease(285.21)     Arrhythmia     pac, pvc    AV block     hi grade 2nd degree AVB at night ?sleep apnea w/u in progress-waqar    CKD (chronic kidney disease) stage 2, GFR 60-89 ml/min 2022    COVID-19 vaccination declined 2024    Dyslipidemia     Elevated blood pressure reading without diagnosis of hypertension 2023    End stage kidney disease (H)     H/O: Bell's palsy 2024    High risk medication use     History of atrial fibrillation 2023    HTN, kidney transplant related 2023    Hypertension     Immunosuppressed status     Kidney replaced by transplant 2014    Paroxysmal atrial fibrillation (H)     Vitamin D deficiency 2022     Past Surgical History:   Procedure Laterality Date    AV FISTULA OR GRAFT ARTERIAL      CREATE FISTULA ARTERIOVENOUS LOWER EXTREMITY  2011    Procedure:CREATE FISTULA ARTERIOVENOUS UPPER EXTREMITY; LEFT ARTERIOVENOUS FISTULA WITH WRIST VEIN; Surgeon:SYMONE ROSS; Location: OR    CYSTOSCOPY, REMOVE STENT(S) CHILD, COMBINED  6/3/2014    Procedure: COMBINED CYSTOSCOPY, REMOVE STENT(S);  Surgeon: Aneesh Perera MD;  Location: [facility] OR    TONSILLECTOMY      TRANSPLANT KIDNEY RECIPIENT  DONOR  5/3/2014    Procedure: TRANSPLANT KIDNEY RECIPIENT  DONOR;  Surgeon: Aneesh Perera MD;  Location: [facility] OR     Current  "providers sharing in care for this patient include:  Patient Care Team:  Wilder Lion MD (Inactive) as PCP - General (Family Practice)  Adan Hill MD as MD (Nephrology)  Sarkis Prieto MD as MD (Nephrology)  Wilder Lion MD (Inactive) as Assigned PCP  Roscoe Razo MD as MD (Nephrology)  Roscoe Razo MD as Assigned Nephrology Provider  Rubens Wilder MD as Physician (Transplant)    The following health maintenance items are reviewed in Epic and correct as of today:  Health Maintenance   Topic Date Due    A1C  05/05/2015    MICROALBUMIN  05/03/2024    ANNUAL REVIEW OF HM ORDERS  02/22/2025    MEDICARE ANNUAL WELLNESS VISIT  02/22/2025    COVID-19 Vaccine (7 - Moderna risk 2024-25 season) 05/18/2025    DTAP/TDAP/TD IMMUNIZATION (2 - Td or Tdap) 10/01/2025    BMP  02/05/2026    FALL RISK ASSESSMENT  03/20/2026    ADVANCE CARE PLANNING  02/22/2029    HEPATITIS C SCREENING  Completed    PHQ-2 (once per calendar year)  Completed    INFLUENZA VACCINE  Completed    Pneumococcal Vaccine: 50+ Years  Completed    URINALYSIS  Completed    ZOSTER IMMUNIZATION  Completed    RSV VACCINE  Completed    HPV IMMUNIZATION  Aged Out    MENINGITIS IMMUNIZATION  Aged Out    LIPID  Discontinued    DIABETES SCREENING  Discontinued    COLORECTAL CANCER SCREENING  Discontinued         Review of Systems  Constitutional, HEENT, cardiovascular, pulmonary, GI, , musculoskeletal, neuro, skin, endocrine and psych systems are negative, except as otherwise noted.     Objective    Exam  BP (!) 147/81 (BP Location: Right arm, Patient Position: Sitting, Cuff Size: Adult Regular)   Pulse 70   Temp 98.2  F (36.8  C) (Oral)   Resp 18   Ht 1.727 m (5' 8\")   Wt 72.7 kg (160 lb 3.2 oz)   SpO2 97%   BMI 24.36 kg/m     Estimated body mass index is 24.36 kg/m  as calculated from the following:    Height as of this encounter: 1.727 m (5' 8\").    Weight as of this encounter: 72.7 kg (160 lb 3.2 oz).    Physical " Exam  GENERAL: alert and no distress  NECK: no adenopathy, no asymmetry, masses, or scars  RESP: lungs clear to auscultation - no rales, rhonchi or wheezes  CV: regular rate and rhythm, normal S1 S2, no S3 or S4, no murmur, click or rub, no peripheral edema  ABDOMEN: soft, nontender, no hepatosplenomegaly, no masses and bowel sounds normal  MS: no gross musculoskeletal defects noted, no edema  Large fistula on the left arm.        3/20/2025   Mini Cog   Clock Draw Score 2 Normal   3 Item Recall 3 objects recalled   Mini Cog Total Score 5              Signed Electronically by: Lupe Villarreal MD

## 2025-03-20 NOTE — PROGRESS NOTES
Zio  Monitor After Visit Summary - In Clinic Application  A Zio heart rhythm monitor by [employer] has been prescribed to you to determine if you are experiencing irregular heart rhythms. A member of our team has applied your Zio heart rhythm monitor. You are welcome to call [employer] 24/7/365 at [phone] if you need assistance regarding your device or have billing questions.    We highly recommend you download the MyZio Manuel onto your phone to record symptoms, receive helpful tips and reminders and get answers to your questions. It s available for iOS and Android.    AFTER YOU HAVE WORN ZIO FOR THE DURATION PRESCRIBED, PLACE THE MONITOR AND THE SYMPTOM LOGBOOK IN THE PREPAID BOX THAT WAS PROVIDED TO YOU AND RETURN THROUGH THE [place] POSTAL SERVICE.     Instructions:   During the first 24 hours, avoid showering, exercise, or strenuous activity to allow the monitor to adhere to the skin.     After 24 hours, you can resume normal activities including showering.  Avoid direct water running on the monitor. DO NOT submerge the monitor under water.    If you have symptoms, press the button (located in the center of the monitor), and record your symptoms, activity, and date/time of occurrence in the MyZio Manuel or the symptom logbook.    At the end of the wear time, place the monitor and the symptom logbook in the pre-paid/self-addressed box, tape closed and mail it back to [employer] on the same day you remove it. Return the monitor through the [place] Postal Service. You can leave it in your mailbox to be picked up.    The Zio monitor is provided by [employer].  For billing related questions or estimated out-of-pocket costs, contact [employer] before the end of your prescribed wear time at [phone].  You may also receive a text message or phone call from [employer] with additional monitor information.

## 2025-03-20 NOTE — ASSESSMENT & PLAN NOTE
5/2014, he is doing excellent since.  Continue on Prograf 1 mg twice daily, and Cellcept 500 mg twice daily.

## 2025-04-06 LAB — CV ZIO PRELIM RESULTS: NORMAL

## 2025-04-07 ENCOUNTER — TELEPHONE (OUTPATIENT)
Dept: FAMILY MEDICINE | Facility: CLINIC | Age: 80
End: 2025-04-07
Payer: MEDICARE

## 2025-04-07 NOTE — TELEPHONE ENCOUNTER
Please call Bogdan Sue patch is showing many SVT (supraventricular tachycardia), which is mainly a benign arrhythmias, no atrial fibrillation.  I still think it is a good idea for patient to see Cardiology.   (I already put the order in, and I wonder if they called the patient yet?)  Lupe Villarreal MD  [facility].   [phone]

## 2025-04-07 NOTE — TELEPHONE ENCOUNTER
Attempt #1 - phone rings and rings, then goes busy no option to leave voicemail      Gladys Estrella, Registered Nurse  [facility]

## 2025-04-08 NOTE — TELEPHONE ENCOUNTER
RN spoke to patient     Reviewed message below from Dr Villarreal - no questions at this time     Patient agreeable to follow up with cardiology - scheduling number provided [phone]     Gladys Estrella Registered Nurse  [facility]

## 2025-04-19 ENCOUNTER — HEALTH MAINTENANCE LETTER (OUTPATIENT)
Age: 80
End: 2025-04-19

## 2025-04-24 ENCOUNTER — DOCUMENTATION ONLY (OUTPATIENT)
Dept: NEPHROLOGY | Facility: CLINIC | Age: 80
End: 2025-04-24
Payer: MEDICARE

## 2025-04-24 DIAGNOSIS — Z94.0 KIDNEY REPLACED BY TRANSPLANT: Primary | ICD-10-CM

## 2025-04-24 NOTE — PROGRESS NOTES
Updated standing transplant lab orders for under Dr. Rojas.   BMP, CBC, Tacrolimus level and UPCR

## 2025-04-24 NOTE — PROGRESS NOTES
"Vikram Wetzel has an upcoming lab appointment:    Future Appointments   Date Time Provider Department Center   5/7/2025  7:45 AM CR LAB CRLABR CR   5/27/2025  9:00 AM [facility]   3/23/2026  7:00 AM Lupe Villarreal MD CRFP CR     Patient is scheduled for the following lab(s): \" standing orders\"    There is no order available. Please review and place either future orders or HMPO (Review of Health Maintenance Protocol Orders), as appropriate.    Danuta, please reroute message to correct team if I sent to wrong team member.    Health Maintenance Due   Topic    A1C     MICROALBUMIN      Kristin Momin    "

## 2025-05-07 ENCOUNTER — RESULTS FOLLOW-UP (OUTPATIENT)
Dept: FAMILY MEDICINE | Facility: CLINIC | Age: 80
End: 2025-05-07

## 2025-05-07 ENCOUNTER — RESULTS FOLLOW-UP (OUTPATIENT)
Dept: TRANSPLANT | Facility: CLINIC | Age: 80
End: 2025-05-07

## 2025-05-07 ENCOUNTER — LAB (OUTPATIENT)
Dept: LAB | Facility: CLINIC | Age: 80
End: 2025-05-07
Payer: MEDICARE

## 2025-05-07 DIAGNOSIS — N18.2 CKD (CHRONIC KIDNEY DISEASE) STAGE 2, GFR 60-89 ML/MIN: ICD-10-CM

## 2025-05-07 DIAGNOSIS — Z94.0 KIDNEY REPLACED BY TRANSPLANT: ICD-10-CM

## 2025-05-07 LAB
ALBUMIN MFR UR ELPH: <6 MG/DL
ANION GAP SERPL CALCULATED.3IONS-SCNC: 9 MMOL/L (ref 7–15)
BUN SERPL-MCNC: 23.3 MG/DL (ref 8–23)
CALCIUM SERPL-MCNC: 9.5 MG/DL (ref 8.8–10.4)
CHLORIDE SERPL-SCNC: 105 MMOL/L (ref 98–107)
CREAT SERPL-MCNC: 1.29 MG/DL (ref 0.67–1.17)
CREAT UR-MCNC: 69.3 MG/DL
CREAT UR-MCNC: 69.3 MG/DL
EGFRCR SERPLBLD CKD-EPI 2021: 57 ML/MIN/1.73M2
ERYTHROCYTE [DISTWIDTH] IN BLOOD BY AUTOMATED COUNT: 12.8 % (ref 10–15)
GLUCOSE SERPL-MCNC: 98 MG/DL (ref 70–99)
HCO3 SERPL-SCNC: 23 MMOL/L (ref 22–29)
HCT VFR BLD AUTO: 42.5 % (ref 40–53)
HGB BLD-MCNC: 13.5 G/DL (ref 13.3–17.7)
MCH RBC QN AUTO: 27.9 PG (ref 26.5–33)
MCHC RBC AUTO-ENTMCNC: 31.8 G/DL (ref 31.5–36.5)
MCV RBC AUTO: 88 FL (ref 78–100)
MICROALBUMIN UR-MCNC: <12 MG/L
MICROALBUMIN/CREAT UR: NORMAL MG/G{CREAT}
PLATELET # BLD AUTO: 137 10E3/UL (ref 150–450)
POTASSIUM SERPL-SCNC: 4.5 MMOL/L (ref 3.4–5.3)
PROT/CREAT 24H UR: NORMAL MG/G{CREAT}
RBC # BLD AUTO: 4.84 10E6/UL (ref 4.4–5.9)
SODIUM SERPL-SCNC: 137 MMOL/L (ref 135–145)
TACROLIMUS BLD-MCNC: 4.4 UG/L (ref 5–15)
TME LAST DOSE: ABNORMAL H
TME LAST DOSE: ABNORMAL H
WBC # BLD AUTO: 2.8 10E3/UL (ref 4–11)

## 2025-05-07 PROCEDURE — 36415 COLL VENOUS BLD VENIPUNCTURE: CPT

## 2025-05-07 PROCEDURE — 85027 COMPLETE CBC AUTOMATED: CPT

## 2025-05-07 PROCEDURE — 84156 ASSAY OF PROTEIN URINE: CPT

## 2025-05-07 PROCEDURE — 82570 ASSAY OF URINE CREATININE: CPT

## 2025-05-07 PROCEDURE — 82043 UR ALBUMIN QUANTITATIVE: CPT

## 2025-05-07 PROCEDURE — 80048 BASIC METABOLIC PNL TOTAL CA: CPT

## 2025-05-07 PROCEDURE — 80197 ASSAY OF TACROLIMUS: CPT

## 2025-05-26 NOTE — PROGRESS NOTES
Virtual Visit Details    Type of service:  Video Visit   Video Start Time: 8:55  Video End Time:9:10 AM    Originating Location (pt. Location): Home    Distant Location (provider location):  On-site  Platform used for Video Visit: [facility]    TRANSPLANT NEPHROLOGY CLINIC VISIT   Assessment & Plan   No changes to immunosuppression.  Blood work once every 3 months.    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   - Primary Nephrologist: None.    # Immunosuppression: Tacrolimus immediate release (goal 4-6) and Mycophenolate mofetil (dose 500 mg every 12 hours)   - Induction with Recent Transplant:  Not known due to time from transplant   - Continue with intensive monitoring of immunosuppression for efficacy and toxicity.   - Historical Changes in Immunosuppression: lower mycophenolate given time from transplant, age and leukopenia.   - Changes: No    # Leukopenia: predominantly lymphopenia. Due to immunosuppression. On lower dose of mycophenolate.     # Infection Prevention:   Last CD4 Level: 249 (2/2024)  - PJP: None      - CMV IgG Ab High Risk Discordance (D+/R-) at time of transplant: No  Present CMV Serostatus: Positive  - EBV IgG Ab High Risk Discordance (D+/R-) at time of transplant: No  Present EBV Serostatus: Positive    # Hypertension: Controlled;  Goal BP: < 130/80   - Changes: No    # Mineral Bone Disorder:    - Vitamin D; level: Low normal        On supplement: No  - Calcium; level: Normal        On supplement: No    # Electrolytes:  - Potassium; level: Normal        On supplement: No  - Bicarbonate; level: Normal        On supplement: No    # Other Significant PMH:  - 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      # Skin Cancer Risk: Discussed sun protection and recommend regular follow up  with Dermatology. No new lesions.    # Transplant History:  Etiology of Kidney Failure: Unknown etiology  Tx: DDKT  Transplant: 5/3/2014 (Kidney)  Significant transplant-related complications: None    Transplant Office Phone Number: [phone]    Assessment and plan was discussed with the patient and he voiced his understanding and agreement.    Return visit: Return in about 1 year (around 5/27/2026).    Rubens Ware MD    The longitudinal plan of care for the diagnosis(es)/condition(s) as documented were addressed during this visit. Due to the added complexity in care, I will continue to support Vikram in the subsequent management and with ongoing continuity of care.      Chief Complaint   Mr. Wetzel is a 80 year old here for kidney transplant and immunosuppression management.     History of Present Illness   Mr. Wetzel reports feeling good overall.    Since last clinic visit:   Hospitalizations: No   New Medical Issues: No    Active/Exercise: Yes, wood projects. Like to fix things around the house. Going walks.   Chest pain or shortness of breath: No  Lower extremity swelling: No  Weight change: No   Appetite: Good   Nausea and vomiting: No  Diarrhea: No  Heartburn symptoms: No  Fever, sweats or chills: No  Night sweats: No  Urinary complaints: No    Home BP: 125-135/60-70      Problem List   Patient Active Problem List   Diagnosis    Leukopenia    Immunosuppressed status    Kidney replaced by transplant    AV block    Aftercare following organ transplant    History of atrial fibrillation    Chronic kidney disease, stage 3a (H)       Allergies   No Known Allergies    Medications   Current Outpatient Medications   Medication Sig Dispense Refill    aspirin 81 MG tablet Take 1 tablet by mouth daily.      carvedilol (COREG) 6.25 MG tablet Take 1 tablet (6.25 mg) by mouth 2 times daily (with meals). 180 tablet 3    CELLCEPT (BRAND) 250 MG capsule Take 2 capsules (500 mg) by mouth 2 times daily. 120 capsule  "11    Multiple Vitamins-Minerals (PRESERVISION AREDS 2 PO) Take 1 tablet by mouth daily.      PROGRAF (BRAND) 1 MG capsule Take 1 capsule (1 mg) by mouth 2 times daily. 60 capsule 11     No current facility-administered medications for this visit.     There are no discontinued medications.    Physical Exam   Vital Signs: /74   Ht 1.727 m (5' 8\")   Wt 73 kg (161 lb)   BMI 24.48 kg/m      GENERAL: alert and no distress  EYES: Eyes grossly normal to inspection.  No discharge or erythema, or obvious scleral/conjunctival abnormalities.  RESP: No audible wheeze, cough, or visible cyanosis.    SKIN: Visible skin clear. No significant rash, abnormal pigmentation or lesions.  NEURO: Cranial nerves grossly intact.  Mentation and speech appropriate for age.  PSYCH: Appropriate affect, tone, and pace of words      Data         Latest Ref Rng & Units 5/7/2025     7:50 AM 2/5/2025     7:51 AM 11/6/2024     7:55 AM   Renal   Sodium 135 - 145 mmol/L 137  137  136    K 3.4 - 5.3 mmol/L 4.5  4.7  4.8    Cl 98 - 107 mmol/L 105  104  103    Cl (external) 98 - 107 mmol/L 105  104  103    CO2 22 - 29 mmol/L 23  25  25    Urea Nitrogen 8.0 - 23.0 mg/dL 23.3  21.0  22.1    Creatinine 0.67 - 1.17 mg/dL 1.29  1.32  1.22    Glucose 70 - 99 mg/dL 98  98  103    Calcium 8.8 - 10.4 mg/dL 9.5  9.9  9.7          Latest Ref Rng & Units 8/7/2024     7:28 AM 11/8/2023     9:35 AM 8/2/2023     7:24 AM   Bone Health   Parathyroid Hormone Intact 15 - 65 pg/mL 60      Vit D Def 20 - 50 ng/mL  25  30          Latest Ref Rng & Units 5/7/2025     7:50 AM 2/5/2025     7:51 AM 11/6/2024     7:55 AM   Heme   WBC 4.0 - 11.0 10e3/uL 2.8  3.0  2.9    Hgb 13.3 - 17.7 g/dL 13.5  14.0  14.2    Plt 150 - 450 10e3/uL 137  142  126          Latest Ref Rng & Units 10/29/2014     7:58 AM 10/1/2014     8:24 AM 5/3/2014     4:56 PM   Liver   AP 40 - 150 U/L 132  122  92    TBili 0.2 - 1.3 mg/dL 0.6  0.4  0.3    Bilirubin Direct 0.0 - 0.2 mg/dL 0.1  0.1     ALT 0 - " 70 U/L 24  29  25    AST 0 - 45 U/L 20  20  27    Tot Protein 6.8 - 8.8 g/dL 7.3  6.7  7.1    Albumin 3.4 - 5.0 g/dL 3.8  3.4  3.9          Latest Ref Rng & Units 5/5/2014     6:28 AM 5/3/2014     4:56 PM   Pancreas   A1C 4.3 - 6.0 % 5.6  5.2          Latest Ref Rng & Units 7/1/2015     7:57 AM 12/3/2014     8:04 AM 5/6/2014     5:57 AM   Iron studies   Iron 35 - 180 ug/dL 69  37  45    Iron Saturation Index 15 - 46 % 21  12  20          Latest Ref Rng & Units 2/5/2020     8:03 AM 8/7/2019     8:08 AM 6/6/2018     8:02 AM   [facility] Txp Virology   BK Spec  Plasma  Plasma  Plasma    BK Res BKNEG^BK Virus DNA Not Detected copies/mL BK Virus DNA Not Detected  BK Virus DNA Not Detected  BK Virus DNA Not Detected    BK Log <2.7 Log copies/mL Not Calculated  Not Calculated  Not Calculated      Failed to redirect to the Timeline version of the REVFS SmartLink.  Recent Labs   Lab Test 08/07/24  0728 11/06/24  0755 02/05/25  0751 05/07/25  0750   DOSTAC 8/6/2024 11/5/2024 2/4/2025  --    TACROL 5.1 5.0 4.4* 4.4*

## 2025-05-27 ENCOUNTER — VIRTUAL VISIT (OUTPATIENT)
Dept: TRANSPLANT | Facility: CLINIC | Age: 80
End: 2025-05-27
Attending: STUDENT IN AN ORGANIZED HEALTH CARE EDUCATION/TRAINING PROGRAM
Payer: MEDICARE

## 2025-05-27 VITALS
SYSTOLIC BLOOD PRESSURE: 133 MMHG | DIASTOLIC BLOOD PRESSURE: 74 MMHG | WEIGHT: 161 LBS | HEIGHT: 68 IN | BODY MASS INDEX: 24.4 KG/M2

## 2025-05-27 DIAGNOSIS — N18.31 CHRONIC KIDNEY DISEASE, STAGE 3A (H): ICD-10-CM

## 2025-05-27 DIAGNOSIS — Z48.298 AFTERCARE FOLLOWING ORGAN TRANSPLANT: ICD-10-CM

## 2025-05-27 DIAGNOSIS — D70.2 OTHER DRUG-INDUCED NEUTROPENIA: ICD-10-CM

## 2025-05-27 DIAGNOSIS — D84.9 IMMUNOSUPPRESSED STATUS: Primary | ICD-10-CM

## 2025-05-27 DIAGNOSIS — Z94.0 KIDNEY REPLACED BY TRANSPLANT: ICD-10-CM

## 2025-05-27 PROCEDURE — 1126F AMNT PAIN NOTED NONE PRSNT: CPT | Mod: 95 | Performed by: STUDENT IN AN ORGANIZED HEALTH CARE EDUCATION/TRAINING PROGRAM

## 2025-05-27 PROCEDURE — 3075F SYST BP GE 130 - 139MM HG: CPT | Mod: 95 | Performed by: STUDENT IN AN ORGANIZED HEALTH CARE EDUCATION/TRAINING PROGRAM

## 2025-05-27 PROCEDURE — G2211 COMPLEX E/M VISIT ADD ON: HCPCS | Performed by: STUDENT IN AN ORGANIZED HEALTH CARE EDUCATION/TRAINING PROGRAM

## 2025-05-27 PROCEDURE — 3078F DIAST BP <80 MM HG: CPT | Mod: 95 | Performed by: STUDENT IN AN ORGANIZED HEALTH CARE EDUCATION/TRAINING PROGRAM

## 2025-05-27 PROCEDURE — 98006 SYNCH AUDIO-VIDEO EST MOD 30: CPT | Performed by: STUDENT IN AN ORGANIZED HEALTH CARE EDUCATION/TRAINING PROGRAM

## 2025-05-27 ASSESSMENT — PAIN SCALES - GENERAL: PAINLEVEL_OUTOF10: NO PAIN (0)

## 2025-05-27 NOTE — PATIENT INSTRUCTIONS
Patient Recommendations:  - No new recommendations at this time.  - Blood work once every 3 months.    Transplant Patient Information  Your Post Transplant Coordinator is: Danuta Paris  For non urgent items, we encourage you to contact your coordinator/care team online via [employer]  You and your care team can also contact your transplant coordinator Monday - Friday, 8am - 5pm at [phone] (Option 2 to reach the coordinator or Option 4 to schedule an appointment).  After hours for urgent matters, please call [facility] at [phone].

## 2025-05-27 NOTE — LETTER
5/27/2025      Vikram Wetzel  [address] [facility] [address]      Dear Colleague,    Thank you for referring your patient, Vikram Wetzel, to the [facility] TRANSPLANT [facility]. Please see a copy of my visit note below.    Virtual Visit Details    Type of service:  Video Visit   Video Start Time: 8:55  Video End Time:9:10 AM    Originating Location (pt. Location): Home    Distant Location (provider location):  On-site  Platform used for Video Visit: [facility]    TRANSPLANT NEPHROLOGY CLINIC VISIT   Assessment & Plan  No changes to immunosuppression.  Blood work once every 3 months.    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   - Primary Nephrologist: None.    # Immunosuppression: Tacrolimus immediate release (goal 4-6) and Mycophenolate mofetil (dose 500 mg every 12 hours)   - Induction with Recent Transplant:  Not known due to time from transplant   - Continue with intensive monitoring of immunosuppression for efficacy and toxicity.   - Historical Changes in Immunosuppression: lower mycophenolate given time from transplant, age and leukopenia.   - Changes: No    # Leukopenia: predominantly lymphopenia. Due to immunosuppression. On lower dose of mycophenolate.     # Infection Prevention:   Last CD4 Level: 249 (2/2024)  - PJP: None      - CMV IgG Ab High Risk Discordance (D+/R-) at time of transplant: No  Present CMV Serostatus: Positive  - EBV IgG Ab High Risk Discordance (D+/R-) at time of transplant: No  Present EBV Serostatus: Positive    # Hypertension: Controlled;  Goal BP: < 130/80   - Changes: No    # Mineral Bone Disorder:    - Vitamin D; level: Low normal        On supplement: No  - Calcium; level: Normal        On supplement: No    # Electrolytes:  - Potassium; level: Normal         On supplement: No  - Bicarbonate; level: Normal        On supplement: No    # Other Significant PMH:  - 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      # Skin Cancer Risk: Discussed sun protection and recommend regular follow up with Dermatology. No new lesions.    # Transplant History:  Etiology of Kidney Failure: Unknown etiology  Tx: DDKT  Transplant: 5/3/2014 (Kidney)  Significant transplant-related complications: None    Transplant Office Phone Number: [phone]    Assessment and plan was discussed with the patient and he voiced his understanding and agreement.    Return visit: Return in about 1 year (around 5/27/2026).    Rubens Ware MD    The longitudinal plan of care for the diagnosis(es)/condition(s) as documented were addressed during this visit. Due to the added complexity in care, I will continue to support Vikram in the subsequent management and with ongoing continuity of care.      Chief Complaint  Mr. Wetzel is a 80 year old here for kidney transplant and immunosuppression management.     History of Present Illness  Mr. Wetzel reports feeling good overall.    Since last clinic visit:   Hospitalizations: No   New Medical Issues: No    Active/Exercise: Yes, wood projects. Like to fix things around the house. Going walks.   Chest pain or shortness of breath: No  Lower extremity swelling: No  Weight change: No   Appetite: Good   Nausea and vomiting: No  Diarrhea: No  Heartburn symptoms: No  Fever, sweats or chills: No  Night sweats: No  Urinary complaints: No    Home BP: 125-135/60-70      Problem List  Patient Active Problem List   Diagnosis     Leukopenia     Immunosuppressed status     Kidney replaced by transplant     AV block     Aftercare following organ transplant     History of atrial fibrillation     Chronic kidney disease, stage 3a (H)       Allergies  No Known Allergies    Medications  Current Outpatient Medications   Medication Sig Dispense Refill     aspirin 81 MG  "tablet Take 1 tablet by mouth daily.       carvedilol (COREG) 6.25 MG tablet Take 1 tablet (6.25 mg) by mouth 2 times daily (with meals). 180 tablet 3     CELLCEPT (BRAND) 250 MG capsule Take 2 capsules (500 mg) by mouth 2 times daily. 120 capsule 11     Multiple Vitamins-Minerals (PRESERVISION AREDS 2 PO) Take 1 tablet by mouth daily.       PROGRAF (BRAND) 1 MG capsule Take 1 capsule (1 mg) by mouth 2 times daily. 60 capsule 11     No current facility-administered medications for this visit.     There are no discontinued medications.    Physical Exam  Vital Signs: /74   Ht 1.727 m (5' 8\")   Wt 73 kg (161 lb)   BMI 24.48 kg/m      GENERAL: alert and no distress  EYES: Eyes grossly normal to inspection.  No discharge or erythema, or obvious scleral/conjunctival abnormalities.  RESP: No audible wheeze, cough, or visible cyanosis.    SKIN: Visible skin clear. No significant rash, abnormal pigmentation or lesions.  NEURO: Cranial nerves grossly intact.  Mentation and speech appropriate for age.  PSYCH: Appropriate affect, tone, and pace of words      Data        Latest Ref Rng & Units 5/7/2025     7:50 AM 2/5/2025     7:51 AM 11/6/2024     7:55 AM   Renal   Sodium 135 - 145 mmol/L 137  137  136    K 3.4 - 5.3 mmol/L 4.5  4.7  4.8    Cl 98 - 107 mmol/L 105  104  103    Cl (external) 98 - 107 mmol/L 105  104  103    CO2 22 - 29 mmol/L 23  25  25    Urea Nitrogen 8.0 - 23.0 mg/dL 23.3  21.0  22.1    Creatinine 0.67 - 1.17 mg/dL 1.29  1.32  1.22    Glucose 70 - 99 mg/dL 98  98  103    Calcium 8.8 - 10.4 mg/dL 9.5  9.9  9.7          Latest Ref Rng & Units 8/7/2024     7:28 AM 11/8/2023     9:35 AM 8/2/2023     7:24 AM   Bone Health   Parathyroid Hormone Intact 15 - 65 pg/mL 60      Vit D Def 20 - 50 ng/mL  25  30          Latest Ref Rng & Units 5/7/2025     7:50 AM 2/5/2025     7:51 AM 11/6/2024     7:55 AM   Heme   WBC 4.0 - 11.0 10e3/uL 2.8  3.0  2.9    Hgb 13.3 - 17.7 g/dL 13.5  14.0  14.2    Plt 150 - 450 " 10e3/uL 137  142  126          Latest Ref Rng & Units 10/29/2014     7:58 AM 10/1/2014     8:24 AM 5/3/2014     4:56 PM   Liver   AP 40 - 150 U/L 132  122  92    TBili 0.2 - 1.3 mg/dL 0.6  0.4  0.3    Bilirubin Direct 0.0 - 0.2 mg/dL 0.1  0.1     ALT 0 - 70 U/L 24  29  25    AST 0 - 45 U/L 20  20  27    Tot Protein 6.8 - 8.8 g/dL 7.3  6.7  7.1    Albumin 3.4 - 5.0 g/dL 3.8  3.4  3.9          Latest Ref Rng & Units 5/5/2014     6:28 AM 5/3/2014     4:56 PM   Pancreas   A1C 4.3 - 6.0 % 5.6  5.2          Latest Ref Rng & Units 7/1/2015     7:57 AM 12/3/2014     8:04 AM 5/6/2014     5:57 AM   Iron studies   Iron 35 - 180 ug/dL 69  37  45    Iron Saturation Index 15 - 46 % 21  12  20          Latest Ref Rng & Units 2/5/2020     8:03 AM 8/7/2019     8:08 AM 6/6/2018     8:02 AM   [facility] Txp Virology   BK Spec  Plasma  Plasma  Plasma    BK Res BKNEG^BK Virus DNA Not Detected copies/mL BK Virus DNA Not Detected  BK Virus DNA Not Detected  BK Virus DNA Not Detected    BK Log <2.7 Log copies/mL Not Calculated  Not Calculated  Not Calculated      Failed to redirect to the Timeline version of the REVFS SmartLink.  Recent Labs   Lab Test 08/07/24  0728 11/06/24  0755 02/05/25  0751 05/07/25  0750   DOSTAC 8/6/2024 11/5/2024 2/4/2025  --    TACROL 5.1 5.0 4.4* 4.4*           Again, thank you for allowing me to participate in the care of your patient.        Sincerely,        Rubens Ware MD    Electronically signed

## 2025-05-27 NOTE — NURSING NOTE
Current patient location: [address] [facility] [address]    Is the patient currently in the state of [place]? YES    Visit mode: VIDEO    If the visit is dropped, the patient can be reconnected by:VIDEO VISIT: Send to e-mail at: [email or web address]    Will anyone else be joining the visit? NO  (If patient encounters technical issues they should call [phone]    Are changes needed to the allergy or medication list? No    Are refills needed on medications prescribed by this physician? NO    Rooming Documentation:  Questionnaire(s) completed    Reason for visit: YAMINI HAWTHORNE

## 2025-08-06 ENCOUNTER — LAB (OUTPATIENT)
Dept: LAB | Facility: CLINIC | Age: 80
End: 2025-08-06
Payer: MEDICARE

## 2025-08-06 DIAGNOSIS — Z94.0 KIDNEY REPLACED BY TRANSPLANT: ICD-10-CM

## 2025-08-06 LAB
ALBUMIN MFR UR ELPH: <6 MG/DL
ANION GAP SERPL CALCULATED.3IONS-SCNC: 8 MMOL/L (ref 7–15)
BUN SERPL-MCNC: 20.4 MG/DL (ref 8–23)
CALCIUM SERPL-MCNC: 9.8 MG/DL (ref 8.8–10.4)
CHLORIDE SERPL-SCNC: 103 MMOL/L (ref 98–107)
CREAT SERPL-MCNC: 1.21 MG/DL (ref 0.67–1.17)
CREAT UR-MCNC: 69.4 MG/DL
EGFRCR SERPLBLD CKD-EPI 2021: 61 ML/MIN/1.73M2
ERYTHROCYTE [DISTWIDTH] IN BLOOD BY AUTOMATED COUNT: 12.7 % (ref 10–15)
GLUCOSE SERPL-MCNC: 95 MG/DL (ref 70–99)
HCO3 SERPL-SCNC: 25 MMOL/L (ref 22–29)
HCT VFR BLD AUTO: 41.2 % (ref 40–53)
HGB BLD-MCNC: 13.2 G/DL (ref 13.3–17.7)
MCH RBC QN AUTO: 28.1 PG (ref 26.5–33)
MCHC RBC AUTO-ENTMCNC: 32 G/DL (ref 31.5–36.5)
MCV RBC AUTO: 88 FL (ref 78–100)
PLATELET # BLD AUTO: 124 10E3/UL (ref 150–450)
POTASSIUM SERPL-SCNC: 4.8 MMOL/L (ref 3.4–5.3)
PROT/CREAT 24H UR: NORMAL MG/G{CREAT}
RBC # BLD AUTO: 4.69 10E6/UL (ref 4.4–5.9)
SODIUM SERPL-SCNC: 136 MMOL/L (ref 135–145)
TACROLIMUS BLD-MCNC: 5.2 UG/L (ref 5–15)
TME LAST DOSE: NORMAL H
TME LAST DOSE: NORMAL H
WBC # BLD AUTO: 3.2 10E3/UL (ref 4–11)

## 2025-08-06 PROCEDURE — 80048 BASIC METABOLIC PNL TOTAL CA: CPT

## 2025-08-06 PROCEDURE — 84156 ASSAY OF PROTEIN URINE: CPT

## 2025-08-06 PROCEDURE — 36415 COLL VENOUS BLD VENIPUNCTURE: CPT

## 2025-08-06 PROCEDURE — 80197 ASSAY OF TACROLIMUS: CPT

## 2025-08-06 PROCEDURE — 85027 COMPLETE CBC AUTOMATED: CPT

## (undated) DEVICE — KIT ENDO TURNOVER/PROCEDURE W/CLEAN A SCOPE LINERS 103888

## (undated) DEVICE — ENDO TRAP POLYP QUICK CATCH 710201

## (undated) DEVICE — ENDO SNARE POLYPECTOMY OVAL 15MM LOOP SD-240U-15

## (undated) DEVICE — ESU GROUND PAD ADULT W/CORD E7507

## (undated) RX ORDER — FENTANYL CITRATE 50 UG/ML
INJECTION, SOLUTION INTRAMUSCULAR; INTRAVENOUS
Status: DISPENSED
Start: 2020-01-30